# Patient Record
Sex: MALE | Race: WHITE | NOT HISPANIC OR LATINO | Employment: FULL TIME | URBAN - METROPOLITAN AREA
[De-identification: names, ages, dates, MRNs, and addresses within clinical notes are randomized per-mention and may not be internally consistent; named-entity substitution may affect disease eponyms.]

---

## 2017-01-09 ENCOUNTER — OFFICE VISIT (OUTPATIENT)
Dept: CARDIOLOGY | Facility: MEDICAL CENTER | Age: 67
End: 2017-01-09
Payer: COMMERCIAL

## 2017-01-09 VITALS
HEIGHT: 72 IN | SYSTOLIC BLOOD PRESSURE: 114 MMHG | OXYGEN SATURATION: 92 % | DIASTOLIC BLOOD PRESSURE: 74 MMHG | WEIGHT: 188 LBS | BODY MASS INDEX: 25.47 KG/M2 | HEART RATE: 75 BPM

## 2017-01-09 DIAGNOSIS — Z91.148 NONCOMPLIANCE WITH MEDICATION REGIMEN: ICD-10-CM

## 2017-01-09 DIAGNOSIS — Z95.1 S/P CORONARY ARTERY BYPASS GRAFT X 5: ICD-10-CM

## 2017-01-09 DIAGNOSIS — I10 ESSENTIAL HYPERTENSION: ICD-10-CM

## 2017-01-09 DIAGNOSIS — F17.200 TOBACCO DEPENDENCE: ICD-10-CM

## 2017-01-09 DIAGNOSIS — I25.10 CORONARY ARTERY DISEASE INVOLVING NATIVE CORONARY ARTERY OF NATIVE HEART WITHOUT ANGINA PECTORIS: ICD-10-CM

## 2017-01-09 PROCEDURE — 99214 OFFICE O/P EST MOD 30 MIN: CPT | Performed by: INTERNAL MEDICINE

## 2017-01-09 RX ORDER — ATORVASTATIN CALCIUM 40 MG/1
40 TABLET, FILM COATED ORAL DAILY
Qty: 90 TAB | Refills: 3 | Status: SHIPPED | OUTPATIENT
Start: 2017-01-09 | End: 2021-06-01

## 2017-01-09 ASSESSMENT — ENCOUNTER SYMPTOMS
PALPITATIONS: 0
DEPRESSION: 0
SHORTNESS OF BREATH: 0
VOMITING: 0
CONSTITUTIONAL NEGATIVE: 1
NAUSEA: 0
HEADACHES: 0
SENSORY CHANGE: 1
WEAKNESS: 0

## 2017-01-09 NOTE — MR AVS SNAPSHOT
Hugo Gomez   2017 3:40 PM   Office Visit   MRN: [id number]    Department:  [facility]   Dept Phone:  [phone]    Description:  Male : 1950   Provider:  Olaf Carter M.D.           Reason for Visit     Follow-Up           Allergies as of 2017     No Known Allergies      You were diagnosed with     Coronary artery disease involving native coronary artery of native heart without angina pectoris   [4518406]       Tobacco dependence   [030136]       S/P coronary artery bypass graft x 5   [403349]       Essential hypertension   [4557427]         Vital Signs     Blood Pressure Pulse Height Weight Body Mass Index Oxygen Saturation    114/74 mmHg 75 1.829 m (6') 85.276 kg (188 lb) 25.49 kg/m2 92%    Smoking Status                   Current Some Day Smoker           Basic Information     Date Of Birth Sex Race Ethnicity Preferred Language    1950 Male White Non- English      Problem List              ICD-10-CM Priority Class Noted - Resolved    Angioedema T78.3XXA   2013 - Present    Elevated troponin R79.89   2013 - Present    History of myocardial infarction I25.2   Unknown - Present    S/P coronary artery bypass graft x 5 Z95.1   Unknown - Present    CAD (coronary artery disease) I25.10   2013 - Present    HTN (hypertension) I10   2013 - Present    Other and unspecified hyperlipidemia E78.5   2013 - Present    Tobacco dependence F17.200   2013 - Present    Ulnar neuropathy at wrist G56.20   2013 - Present    Peripheral autonomic neuropathy G90.9   2013 - Present    Venous insufficiency of leg I87.2   2014 - Present    Neuropathy of both feet G57.93   2015 - Present      Health Maintenance        Date Due Completion Dates    IMM DTaP/Tdap/Td Vaccine (1 - Tdap) 1970 ---    COLONOSCOPY 2001 ---    IMM ZOSTER VACCINE 2011 ---    IMM PNEUMOCOCCAL 65+ (ADULT) LOW/MEDIUM RISK SERIES (1 of 2 - PCV13)  1/9/2016 1/2/2010    IMM INFLUENZA (1) 9/1/2016 ---            Current Immunizations     Pneumococcal polysaccharide vaccine (PPSV-23) 1/2/2010      Below and/or attached are the medications your provider expects you to take. Review all of your home medications and newly ordered medications with your provider and/or pharmacist. Follow medication instructions as directed by your provider and/or pharmacist. Please keep your medication list with you and share with your provider. Update the information when medications are discontinued, doses are changed, or new medications (including over-the-counter products) are added; and carry medication information at all times in the event of emergency situations     Allergies:  No Known Allergies          Medications  Valid as of: January 09, 2017 -  3:56 PM    Generic Name Brand Name Tablet Size Instructions for use    Aspirin (Tablet Delayed Response) aspirin 81 MG Take 1 Tab by mouth every day.        Atorvastatin Calcium (Tab) LIPITOR 40 MG Take 1 Tab by mouth every day.        .                 Medicines prescribed today were sent to:     [address] ([place]), [place] [employer]    [facility] [place] [address]    Phone: [phone] Fax: [phone]    Open 24 Hours?: No      Medication refill instructions:       If your prescription bottle indicates you have medication refills left, it is not necessary to call your provider’s office. Please contact your pharmacy and they will refill your medication.    If your prescription bottle indicates you do not have any refills left, you may request refills at any time through one of the following ways: The online [email or web address] system (except Urgent Care), by calling your provider’s office, or by asking your pharmacy to contact your provider’s office with a refill request. Medication refills are processed only during regular business hours and may not be available until the next business day. Your provider may  request additional information or to have a follow-up visit with you prior to refilling your medication.   *Please Note: Medication refills are assigned a new Rx number when refilled electronically. Your pharmacy may indicate that no refills were authorized even though a new prescription for the same medication is available at the pharmacy. Please request the medicine by name with the pharmacy before contacting your provider for a refill.        Your To Do List     Future Labs/Procedures Complete By Expires    LIPID PROFILE  3/9/2017 7/11/2017         eyefactive Access Code: [email or web address]  Expires: 2/8/2017  3:56 PM    eyefactive  A secure, online tool to manage your health information     [employer]’s eyefactive® is a secure, online tool that connects you to your personalized health information from the privacy of your home -- day or night - making it very easy for you to manage your healthcare. Once the activation process is completed, you can even access your medical information using the eyefactive tarik, which is available for free in the Apple Tarik store or Google Play store.     eyefactive provides the following levels of access (as shown below):   My Chart Features   [facility] Doctor [facility]  Urgent  Care [facility]  Primary Care  Doctor   Email your healthcare team securely and privately 24/7 X X X    Manage appointments: schedule your next appointment; view details of past/upcoming appointments X      Request prescription refills. X      View recent personal medical records, including lab and immunizations X X X X   View health record, including health history, allergies, medications X X X X   Read reports about your outpatient visits, procedures, consult and ER notes X X X X   See your discharge summary, which is a recap of your hospital and/or ER visit that includes your diagnosis, lab results, and care plan. X X       How to register for eyefactive:  1. Go to   [email or web address].  2. Click on the Sign Up Now box, which takes you to the New Member Sign Up page. You will need to provide the following information:  a. Enter your [employer] Access Code exactly as it appears at the top of this page. (You will not need to use this code after you’ve completed the sign-up process. If you do not sign up before the expiration date, you must request a new code.)   b. Enter your date of birth.   c. Enter your home email address.   d. Click Submit, and follow the next screen’s instructions.  3. Create a [employer] ID. This will be your [employer] login ID and cannot be changed, so think of one that is secure and easy to remember.  4. Create a [employer] password. You can change your password at any time.  5. Enter your Password Reset Question and Answer. This can be used at a later time if you forget your password.   6. Enter your e-mail address. This allows you to receive e-mail notifications when new information is available in [employer].  7. Click Sign Up. You can now view your health information.    For assistance activating your [employer] account, call [phone]

## 2017-01-09 NOTE — Clinical Note
[facility]   [address]  [place] [address]  Phone: [phone]  Fax: [phone]              Hugo Gomez  1950    Encounter Date: 1/9/2017    Olaf Carter M.D.          PROGRESS NOTE:  Subjective:   Hugo Gomez is a 67 y.o. male who presents today for follow-up of coronary disease and prior bypass graft surgery he underwent 5 vessel bypass in December, 2013, with DULCE MARIA to LAD, saphenous vein graft to PDA, and a sequential saphenous vein graft to the diagonal, OM1, and OM 2.  He's had no angina at all. Unfortunately he is still smoking cigarettes.  The patient stopped his medicines (aspirin and atorvastatin) months ago for unclear reasons.    Past Medical History   Diagnosis Date   • ASTHMA      REMOTE HISTORY   • Myocardial infarct (HCC)      CURRENT   • Arrhythmia      REMOTE HISTORY IN HS   • History of myocardial infarction    • S/P coronary artery bypass graft x 5    • HTN (hypertension) 12/23/2013     Past Surgical History   Procedure Laterality Date   • Cholecystectomy     • Multiple coronary artery bypass endo vein harvest  12/7/2013     Performed by Mo Vincnet M.D. at SURGERY [facility] ORS     History reviewed. No pertinent family history.  History   Smoking status   • Current Some Day Smoker -- 0.50 packs/day   • Types: Cigarettes   Smokeless tobacco   • Never Used     No Known Allergies  Outpatient Encounter Prescriptions as of 1/9/2017   Medication Sig Dispense Refill   • atorvastatin (LIPITOR) 40 MG Tab Take 1 Tab by mouth every day. 90 Tab 3   • [DISCONTINUED] atorvastatin (LIPITOR) 40 MG Tab Take 1 Tab by mouth every day. (Patient not taking: Reported on 1/9/2017) 90 Tab 2   • aspirin EC 81 MG EC tablet Take 1 Tab by mouth every day. (Patient not taking: Reported on 1/9/2017) 30 Tab 3     No facility-administered encounter medications on file as of 1/9/2017.     Review of Systems   Constitutional:  Negative.  Negative for malaise/fatigue.   HENT: Negative for hearing loss.    Respiratory: Negative for shortness of breath.    Cardiovascular: Negative for chest pain, palpitations and leg swelling.   Gastrointestinal: Negative for nausea and vomiting.   Neurological: Positive for sensory change. Negative for weakness and headaches.        Complains of pain in his feet   Psychiatric/Behavioral: Negative for depression.        Objective:   /74 mmHg  Pulse 75  Ht 1.829 m (6')  Wt 85.276 kg (188 lb)  BMI 25.49 kg/m2  SpO2 92%    Physical Exam   Constitutional: He is oriented to person, place, and time. He appears well-developed and well-nourished. No distress.   HENT:   Head: Atraumatic.   Eyes: Conjunctivae and EOM are normal. Pupils are equal, round, and reactive to light.   Neck: Neck supple. No JVD present.   Cardiovascular: Normal rate and regular rhythm.    No murmur heard.  Physiologically split S2   Pulmonary/Chest: Effort normal and breath sounds normal. No respiratory distress. He has no wheezes. He has no rales.   Median sternotomy scar is well healed   Abdominal: Soft. There is no tenderness.   Musculoskeletal: He exhibits no edema.   Neurological: He is alert and oriented to person, place, and time.   Skin: Skin is warm and dry. He is not diaphoretic.   Lesion on left ear. Patient urged to see dermatology   Psychiatric: He has a normal mood and affect.       Assessment:     1. Coronary artery disease involving native coronary artery of native heart without angina pectoris  atorvastatin (LIPITOR) 40 MG Tab    LIPID PROFILE   2. Tobacco dependence  atorvastatin (LIPITOR) 40 MG Tab   3. S/P coronary artery bypass graft x 5  atorvastatin (LIPITOR) 40 MG Tab    LIPID PROFILE   4. Essential hypertension     5. Noncompliance with medication regimen         Medical Decision Making:  Today's Assessment / Status / Plan:     He is now 3 years post CABG and is doing well without any angina. Smoking  cessation was again strongly urged. He stopped all his medications on his own months ago. The rationale for continued aspirin and lipid therapy was discussed with him. He'll be restarted on aspirin 81 mg a day and atorvastatin 40 mg a day. His obstructive lipid profile 2 months from now. Compliance with medical regimen was strongly urged.      Chad Menjivar M.D.  [address] [address]  VIA Facsimile: [phone]

## 2017-01-10 NOTE — PROGRESS NOTES
Subjective:   Hugo Gomez is a 67 y.o. male who presents today for follow-up of coronary disease and prior bypass graft surgery he underwent 5 vessel bypass in December, 2013, with DULCE MARIA to LAD, saphenous vein graft to PDA, and a sequential saphenous vein graft to the diagonal, OM1, and OM 2.  He's had no angina at all. Unfortunately he is still smoking cigarettes.  The patient stopped his medicines (aspirin and atorvastatin) months ago for unclear reasons.    Past Medical History   Diagnosis Date   • ASTHMA      REMOTE HISTORY   • Myocardial infarct (HCC)      CURRENT   • Arrhythmia      REMOTE HISTORY IN HS   • History of myocardial infarction    • S/P coronary artery bypass graft x 5    • HTN (hypertension) 12/23/2013     Past Surgical History   Procedure Laterality Date   • Cholecystectomy     • Multiple coronary artery bypass endo vein harvest  12/7/2013     Performed by Mo Vincent M.D. at SURGERY [facility] ORS     History reviewed. No pertinent family history.  History   Smoking status   • Current Some Day Smoker -- 0.50 packs/day   • Types: Cigarettes   Smokeless tobacco   • Never Used     No Known Allergies  Outpatient Encounter Prescriptions as of 1/9/2017   Medication Sig Dispense Refill   • atorvastatin (LIPITOR) 40 MG Tab Take 1 Tab by mouth every day. 90 Tab 3   • [DISCONTINUED] atorvastatin (LIPITOR) 40 MG Tab Take 1 Tab by mouth every day. (Patient not taking: Reported on 1/9/2017) 90 Tab 2   • aspirin EC 81 MG EC tablet Take 1 Tab by mouth every day. (Patient not taking: Reported on 1/9/2017) 30 Tab 3     No facility-administered encounter medications on file as of 1/9/2017.     Review of Systems   Constitutional: Negative.  Negative for malaise/fatigue.   HENT: Negative for hearing loss.    Respiratory: Negative for shortness of breath.    Cardiovascular: Negative for chest pain, palpitations and leg swelling.   Gastrointestinal: Negative for nausea and vomiting.   Neurological:  Positive for sensory change. Negative for weakness and headaches.        Complains of pain in his feet   Psychiatric/Behavioral: Negative for depression.        Objective:   /74 mmHg  Pulse 75  Ht 1.829 m (6')  Wt 85.276 kg (188 lb)  BMI 25.49 kg/m2  SpO2 92%    Physical Exam   Constitutional: He is oriented to person, place, and time. He appears well-developed and well-nourished. No distress.   HENT:   Head: Atraumatic.   Eyes: Conjunctivae and EOM are normal. Pupils are equal, round, and reactive to light.   Neck: Neck supple. No JVD present.   Cardiovascular: Normal rate and regular rhythm.    No murmur heard.  Physiologically split S2   Pulmonary/Chest: Effort normal and breath sounds normal. No respiratory distress. He has no wheezes. He has no rales.   Median sternotomy scar is well healed   Abdominal: Soft. There is no tenderness.   Musculoskeletal: He exhibits no edema.   Neurological: He is alert and oriented to person, place, and time.   Skin: Skin is warm and dry. He is not diaphoretic.   Lesion on left ear. Patient urged to see dermatology   Psychiatric: He has a normal mood and affect.       Assessment:     1. Coronary artery disease involving native coronary artery of native heart without angina pectoris  atorvastatin (LIPITOR) 40 MG Tab    LIPID PROFILE   2. Tobacco dependence  atorvastatin (LIPITOR) 40 MG Tab   3. S/P coronary artery bypass graft x 5  atorvastatin (LIPITOR) 40 MG Tab    LIPID PROFILE   4. Essential hypertension     5. Noncompliance with medication regimen         Medical Decision Making:  Today's Assessment / Status / Plan:     He is now 3 years post CABG and is doing well without any angina. Smoking cessation was again strongly urged. He stopped all his medications on his own months ago. The rationale for continued aspirin and lipid therapy was discussed with him. He'll be restarted on aspirin 81 mg a day and atorvastatin 40 mg a day. His obstructive lipid profile 2  months from now. Compliance with medical regimen was strongly urged.

## 2017-02-09 ENCOUNTER — TELEPHONE (OUTPATIENT)
Dept: CARDIOLOGY | Facility: MEDICAL CENTER | Age: 67
End: 2017-02-09

## 2017-02-09 NOTE — TELEPHONE ENCOUNTER
Spoke with Yennifer. Pt. Has had several episodes (including 4  in the past week) of left sided facial swelling.. Wife denied dysphagia, tongue swelling. Pt. Thinks it could be due to Atorvastatin 40mg which Dr. Carter had him restart at 1-9-17 [facility].  He takes Benadryl and sx. Slowly resolve. Pt. Will hold Atorvastatin and await further recommendations from Dr. Carter when he returns to office next week.

## 2017-02-09 NOTE — TELEPHONE ENCOUNTER
----- Message from Stefany Wiledr sent at 2/9/2017  8:13 AM PST -----  Regarding: Problem with swelling in jaw and chin  EMILIA/Jessi    Patient's wife, Yennifer, wants a call back at [phone]. Her  is having problems with swelling in his jaw and chin and she thinks it's a reaction to the Lipitor.

## 2017-02-10 NOTE — TELEPHONE ENCOUNTER
Left message for Yennifer to call.        Message  Received: Today       Olaf Carter M.D.  Jessi Cardenas, L.P.N.       Caller: Unspecified (Yesterday, 8:38 AM)                     Unlikely to be atorvastatin unless lips / tongue involved.   Could be many things including parotid gland stone. Needs to be examined when he has itt

## 2017-02-11 NOTE — TELEPHONE ENCOUNTER
"Spoke with Yennifer to advise. Pt. Only stopped Atorvastatin 2 days ago. He hasn't had any sx. In the past 2 days. Now she noted that pt. Told her during 1 episode \"that he felt like his throat was closing\". She will call in 1 week with update, sooner if sx. Recur while pt. Is off Atorvastatin.  "

## 2017-02-21 ENCOUNTER — TELEPHONE (OUTPATIENT)
Dept: CARDIOLOGY | Facility: MEDICAL CENTER | Age: 67
End: 2017-02-21

## 2017-02-21 NOTE — TELEPHONE ENCOUNTER
----- Message from Ankita Parr sent at 2/21/2017  2:06 PM PST -----  Regarding: patient's wife calling with update  Sergey        Patient's wife Yennifer calling with an update on his condition. She can be reached at [phone].

## 2017-02-21 NOTE — TELEPHONE ENCOUNTER
Per Micelle, pt. Has had no more left -sided facial swelling since stopping Atorvastatin 40mg 12 days ago. He has never taken any other statin medications.  To Dr. Carter for recommendations.

## 2017-02-27 NOTE — TELEPHONE ENCOUNTER
Den De Oliveira, scheduled pt. To see Carlos Manuel on 3-6-17.      Message  Received: 3 days ago       PILAR Hoffmann L.P.N.       Caller: Unspecified (6 days ago, 2:49 PM)                     Stay off statin.   Needs follow up appointment

## 2019-08-26 ENCOUNTER — APPOINTMENT (RX ONLY)
Dept: URBAN - METROPOLITAN AREA CLINIC 4 | Facility: CLINIC | Age: 69
Setting detail: DERMATOLOGY
End: 2019-08-26

## 2019-08-26 DIAGNOSIS — Z85.828 PERSONAL HISTORY OF OTHER MALIGNANT NEOPLASM OF SKIN: ICD-10-CM

## 2019-08-26 DIAGNOSIS — L82.1 OTHER SEBORRHEIC KERATOSIS: ICD-10-CM

## 2019-08-26 DIAGNOSIS — D18.0 HEMANGIOMA: ICD-10-CM

## 2019-08-26 DIAGNOSIS — L57.0 ACTINIC KERATOSIS: ICD-10-CM

## 2019-08-26 DIAGNOSIS — L81.4 OTHER MELANIN HYPERPIGMENTATION: ICD-10-CM

## 2019-08-26 DIAGNOSIS — D22 MELANOCYTIC NEVI: ICD-10-CM

## 2019-08-26 PROBLEM — D18.01 HEMANGIOMA OF SKIN AND SUBCUTANEOUS TISSUE: Status: ACTIVE | Noted: 2019-08-26

## 2019-08-26 PROBLEM — D48.5 NEOPLASM OF UNCERTAIN BEHAVIOR OF SKIN: Status: ACTIVE | Noted: 2019-08-26

## 2019-08-26 PROBLEM — D22.9 MELANOCYTIC NEVI, UNSPECIFIED: Status: ACTIVE | Noted: 2019-08-26

## 2019-08-26 PROCEDURE — ? LIQUID NITROGEN

## 2019-08-26 PROCEDURE — 17003 DESTRUCT PREMALG LES 2-14: CPT

## 2019-08-26 PROCEDURE — ? BIOPSY BY SHAVE METHOD

## 2019-08-26 PROCEDURE — 17000 DESTRUCT PREMALG LESION: CPT | Mod: 59

## 2019-08-26 PROCEDURE — ? COUNSELING

## 2019-08-26 PROCEDURE — 11103 TANGNTL BX SKIN EA SEP/ADDL: CPT

## 2019-08-26 PROCEDURE — 11102 TANGNTL BX SKIN SINGLE LES: CPT

## 2019-08-26 PROCEDURE — ? OBSERVATION

## 2019-08-26 PROCEDURE — 99212 OFFICE O/P EST SF 10 MIN: CPT | Mod: 25

## 2019-08-26 PROCEDURE — ? REFERRAL CORRESPONDENCE

## 2019-08-26 ASSESSMENT — LOCATION SIMPLE DESCRIPTION DERM
LOCATION SIMPLE: LEFT FOREHEAD
LOCATION SIMPLE: RIGHT EYEBROW
LOCATION SIMPLE: LEFT SCALP
LOCATION SIMPLE: LEFT EAR
LOCATION SIMPLE: SCALP
LOCATION SIMPLE: LEFT HAND
LOCATION SIMPLE: LEFT TEMPLE

## 2019-08-26 ASSESSMENT — LOCATION DETAILED DESCRIPTION DERM
LOCATION DETAILED: LEFT MEDIAL TEMPLE
LOCATION DETAILED: LEFT SUPERIOR PARIETAL SCALP
LOCATION DETAILED: LEFT MEDIAL FRONTAL SCALP
LOCATION DETAILED: LEFT SUPERIOR CRUS OF ANTIHELIX
LOCATION DETAILED: RIGHT SUPERIOR PARIETAL SCALP
LOCATION DETAILED: RIGHT CENTRAL EYEBROW
LOCATION DETAILED: LEFT SUPERIOR MEDIAL FOREHEAD
LOCATION DETAILED: LEFT ULNAR DORSAL HAND

## 2019-08-26 ASSESSMENT — LOCATION ZONE DERM
LOCATION ZONE: FACE
LOCATION ZONE: EAR
LOCATION ZONE: SCALP
LOCATION ZONE: HAND

## 2019-08-26 NOTE — PROCEDURE: BIOPSY BY SHAVE METHOD
Post-Care Instructions: I reviewed with the patient in detail post-care instructions. Patient is to keep the biopsy site dry overnight, and then apply vasaline twice daily until healed.
Anesthesia Type: 1% lidocaine with epinephrine and a 1:10 solution of 8.4% sodium bicarbonate
Size Of Lesion In Cm: 0
Lab Facility: 
Wound Care: Vaseline
Electrodesiccation Text: The wound bed was treated with electrodesiccation after the biopsy was performed.
Notification Instructions: Patient will be notified of biopsy results. However, patient instructed to call the office if not contacted within 2 weeks.
Path Notes (To The Dermatopathologist): ED+C if +
Depth Of Biopsy: dermis
Bill For Surgical Tray: no
Type Of Destruction Used: Curettage
Consent: Written consent was obtained and risks were reviewed including but not limited to scarring, infection, bleeding, scabbing, incomplete removal, nerve damage and allergy to anesthesia.
Biopsy Type: H and E
Electrodesiccation And Curettage Text: The wound bed was treated with electrodesiccation and curettage after the biopsy was performed.
Anesthesia Volume In Cc: 0.5
Render Post-Care Instructions In Note?: yes
Dressing: Band-Aid
Hemostasis: Drysol and Electrocautery
Billing Type: Third-Party Bill
Biopsy Method: Personna blade
Detail Level: Detailed
Lab: [id number]
Curettage Text: The wound bed was treated with curettage after the biopsy was performed.

## 2019-09-10 ENCOUNTER — APPOINTMENT (RX ONLY)
Dept: URBAN - METROPOLITAN AREA CLINIC 4 | Facility: CLINIC | Age: 69
Setting detail: DERMATOLOGY
End: 2019-09-10

## 2019-09-10 PROBLEM — C44.519 BASAL CELL CARCINOMA OF SKIN OF OTHER PART OF TRUNK: Status: ACTIVE | Noted: 2019-09-10

## 2019-09-10 PROCEDURE — 17261 DSTRJ MAL LES T/A/L .6-1.0CM: CPT

## 2019-09-10 PROCEDURE — 17262 DSTRJ MAL LES T/A/L 1.1-2.0: CPT

## 2019-09-10 PROCEDURE — ? CURETTAGE AND DESTRUCTION

## 2019-09-10 NOTE — PROCEDURE: CURETTAGE AND DESTRUCTION
Bill For Surgical Tray: no
What Was Performed First?: Curettage
Bill As A Line Item Or As Units: Line Item
Size Of Lesion After Curettage: 1.2
Anesthesia Volume In Cc: 3
Concentration (Mg/Ml Or Millions Of Plaque Forming Units/Cc): 0.01
Total Volume (Ccs): 1
Biopsy Photograph Reviewed: Yes
Consent was obtained from the patient. The risks, benefits and alternatives to therapy were discussed in detail. Specifically, the risks of infection, scarring, bleeding, prolonged wound healing, nerve injury, incomplete removal, allergy to anesthesia and recurrence were addressed. Alternatives to ED&C, such as: surgical removal and XRT were also discussed.  Prior to the procedure, the treatment site was clearly identified and confirmed by the patient. All components of Universal Protocol/PAUSE Rule completed.
Accession # (Optional): [id number]
Anesthesia Type: 0.5% lidocaine with 1:100,000 epinephrine and a 1:10 solution of 8.4% sodium bicarbonate
Detail Level: Detailed
Additional Information: (Optional): The wound was cleaned, and a bandage was applied.  The patient received detailed post-op instructions.
Post-Care Instructions: I reviewed with the patient in detail post-care instructions. Patient is to keep the area dry for 48 hours, and not to engage in any swimming until the area is healed. Should the patient develop any fevers, chills, bleeding, severe pain patient will contact the office immediately.
Cautery Type: electrodesiccation
Size Of Lesion After Curettage: 0.6
Accession # (Optional): [id number]
Anesthesia Type: 0.5% lidocaine with 1:200,000 epinephrine and a 1:10 solution of 8.4% sodium bicarbonate

## 2019-11-12 ENCOUNTER — HOSPITAL ENCOUNTER (OUTPATIENT)
Dept: LAB | Facility: MEDICAL CENTER | Age: 69
End: 2019-11-12
Attending: FAMILY MEDICINE
Payer: COMMERCIAL

## 2019-11-12 LAB
ALBUMIN SERPL BCP-MCNC: 4 G/DL (ref 3.2–4.9)
ALBUMIN/GLOB SERPL: 1.3 G/DL
ALP SERPL-CCNC: 69 U/L (ref 30–99)
ALT SERPL-CCNC: 13 U/L (ref 2–50)
ANION GAP SERPL CALC-SCNC: 7 MMOL/L (ref 0–11.9)
AST SERPL-CCNC: 17 U/L (ref 12–45)
BILIRUB SERPL-MCNC: 0.4 MG/DL (ref 0.1–1.5)
BUN SERPL-MCNC: 20 MG/DL (ref 8–22)
CALCIUM SERPL-MCNC: 9.8 MG/DL (ref 8.5–10.5)
CHLORIDE SERPL-SCNC: 107 MMOL/L (ref 96–112)
CHOLEST SERPL-MCNC: 169 MG/DL (ref 100–199)
CO2 SERPL-SCNC: 28 MMOL/L (ref 20–33)
CREAT SERPL-MCNC: 1.02 MG/DL (ref 0.5–1.4)
GLOBULIN SER CALC-MCNC: 3.1 G/DL (ref 1.9–3.5)
GLUCOSE SERPL-MCNC: 105 MG/DL (ref 65–99)
HDLC SERPL-MCNC: 48 MG/DL
LDLC SERPL CALC-MCNC: 100 MG/DL
POTASSIUM SERPL-SCNC: 4.5 MMOL/L (ref 3.6–5.5)
PROT SERPL-MCNC: 7.1 G/DL (ref 6–8.2)
SODIUM SERPL-SCNC: 142 MMOL/L (ref 135–145)
TRIGL SERPL-MCNC: 105 MG/DL (ref 0–149)

## 2019-11-12 PROCEDURE — 80061 LIPID PANEL: CPT

## 2019-11-12 PROCEDURE — 80053 COMPREHEN METABOLIC PANEL: CPT

## 2019-11-12 PROCEDURE — 36415 COLL VENOUS BLD VENIPUNCTURE: CPT

## 2020-02-24 ENCOUNTER — APPOINTMENT (RX ONLY)
Dept: URBAN - METROPOLITAN AREA CLINIC 4 | Facility: CLINIC | Age: 70
Setting detail: DERMATOLOGY
End: 2020-02-24

## 2020-02-24 DIAGNOSIS — D22 MELANOCYTIC NEVI: ICD-10-CM

## 2020-02-24 DIAGNOSIS — Z85.828 PERSONAL HISTORY OF OTHER MALIGNANT NEOPLASM OF SKIN: ICD-10-CM

## 2020-02-24 DIAGNOSIS — L82.1 OTHER SEBORRHEIC KERATOSIS: ICD-10-CM

## 2020-02-24 DIAGNOSIS — L84 CORNS AND CALLOSITIES: ICD-10-CM

## 2020-02-24 DIAGNOSIS — L81.4 OTHER MELANIN HYPERPIGMENTATION: ICD-10-CM

## 2020-02-24 PROBLEM — D22.5 MELANOCYTIC NEVI OF TRUNK: Status: ACTIVE | Noted: 2020-02-24

## 2020-02-24 PROCEDURE — ? ADDITIONAL NOTES

## 2020-02-24 PROCEDURE — ? OBSERVATION

## 2020-02-24 PROCEDURE — 99213 OFFICE O/P EST LOW 20 MIN: CPT

## 2020-02-24 PROCEDURE — ? COUNSELING

## 2020-02-24 ASSESSMENT — LOCATION DETAILED DESCRIPTION DERM
LOCATION DETAILED: LEFT RADIAL DORSAL HAND
LOCATION DETAILED: RIGHT RADIAL DORSAL HAND
LOCATION DETAILED: LEFT CENTRAL MALAR CHEEK
LOCATION DETAILED: RIGHT PLANTAR FOREFOOT OVERLYING 4TH METATARSAL
LOCATION DETAILED: INFERIOR THORACIC SPINE
LOCATION DETAILED: LEFT PROXIMAL DORSAL FOREARM
LOCATION DETAILED: RIGHT PROXIMAL RADIAL DORSAL FOREARM
LOCATION DETAILED: RIGHT INFERIOR MEDIAL UPPER BACK
LOCATION DETAILED: RIGHT MID-UPPER BACK
LOCATION DETAILED: RIGHT SUPERIOR MEDIAL MIDBACK
LOCATION DETAILED: EPIGASTRIC SKIN
LOCATION DETAILED: RIGHT PROXIMAL DORSAL FOREARM

## 2020-02-24 ASSESSMENT — LOCATION SIMPLE DESCRIPTION DERM
LOCATION SIMPLE: ABDOMEN
LOCATION SIMPLE: LEFT FOREARM
LOCATION SIMPLE: UPPER BACK
LOCATION SIMPLE: RIGHT HAND
LOCATION SIMPLE: RIGHT PLANTAR SURFACE
LOCATION SIMPLE: LEFT HAND
LOCATION SIMPLE: RIGHT FOREARM
LOCATION SIMPLE: RIGHT UPPER BACK
LOCATION SIMPLE: RIGHT LOWER BACK
LOCATION SIMPLE: LEFT CHEEK

## 2020-02-24 ASSESSMENT — LOCATION ZONE DERM
LOCATION ZONE: FEET
LOCATION ZONE: HAND
LOCATION ZONE: TRUNK
LOCATION ZONE: FACE
LOCATION ZONE: ARM

## 2020-02-24 NOTE — PROCEDURE: COUNSELING
Patient Specific Counseling (Will Not Stick From Patient To Patient): If this becomes more bothersome he should see a foot and ankle orthopedic surgeon as this has been painful and may require surgery.
Detail Level: Detailed

## 2020-02-24 NOTE — PROCEDURE: ADDITIONAL NOTES
Detail Level: Zone
Additional Notes: He can apply over the counter Compound W to lesions that are bothersome.

## 2020-03-09 ENCOUNTER — OFFICE VISIT (OUTPATIENT)
Dept: CARDIOLOGY | Facility: MEDICAL CENTER | Age: 70
End: 2020-03-09
Payer: COMMERCIAL

## 2020-03-09 VITALS
SYSTOLIC BLOOD PRESSURE: 100 MMHG | OXYGEN SATURATION: 90 % | DIASTOLIC BLOOD PRESSURE: 60 MMHG | BODY MASS INDEX: 25.47 KG/M2 | HEART RATE: 92 BPM | HEIGHT: 72 IN | WEIGHT: 188 LBS

## 2020-03-09 DIAGNOSIS — Z78.9 STATIN INTOLERANCE: ICD-10-CM

## 2020-03-09 DIAGNOSIS — I25.10 CORONARY ARTERY DISEASE INVOLVING NATIVE CORONARY ARTERY OF NATIVE HEART WITHOUT ANGINA PECTORIS: ICD-10-CM

## 2020-03-09 DIAGNOSIS — I10 ESSENTIAL HYPERTENSION: ICD-10-CM

## 2020-03-09 DIAGNOSIS — F17.200 TOBACCO DEPENDENCE: ICD-10-CM

## 2020-03-09 DIAGNOSIS — Z95.1 S/P CORONARY ARTERY BYPASS GRAFT X 5: ICD-10-CM

## 2020-03-09 DIAGNOSIS — I25.2 HISTORY OF MYOCARDIAL INFARCTION: ICD-10-CM

## 2020-03-09 PROCEDURE — 99203 OFFICE O/P NEW LOW 30 MIN: CPT | Performed by: INTERNAL MEDICINE

## 2020-03-09 RX ORDER — LOVASTATIN 10 MG/1
10 TABLET ORAL
COMMUNITY
Start: 2020-02-29 | End: 2021-06-01

## 2020-03-09 RX ORDER — ROSUVASTATIN CALCIUM 10 MG/1
10 TABLET, COATED ORAL
Qty: 30 TAB | Refills: 3 | Status: SHIPPED | OUTPATIENT
Start: 2020-03-09 | End: 2020-12-30

## 2020-03-09 ASSESSMENT — ENCOUNTER SYMPTOMS
GASTROINTESTINAL NEGATIVE: 1
DEPRESSION: 1
CONSTITUTIONAL NEGATIVE: 1
MUSCULOSKELETAL NEGATIVE: 1
SENSORY CHANGE: 1
RESPIRATORY NEGATIVE: 1
CARDIOVASCULAR NEGATIVE: 1

## 2020-03-09 NOTE — PROGRESS NOTES
Chief Complaint   Patient presents with   • Coronary Artery Disease       Subjective:   Hugo Gomez is a 70 y.o. male who presents today for follow-up of coronary disease.  He was last seen in the office a little over 3 years ago.  The patient underwent bypass graft surgery in December, 2013, with DULCE MARIA to LAD and saphenous vein graft to PDA and a sequential saphenous vein graft to diagonal, OM1 and OM 2.  He has had no angina at all.  Unfortunately still smoking cigarettes.    He has a difficult time tolerating statins.  He knows they cause mood changes and some depression.  He clearly feels better within a few days of stopping them.  Is currently on low-dose lovastatin with LDL under marginal control.  Otherwise no interval problems except for his continued problems with peripheral neuropathy.  He is moderately physically active at work and also works out on a [employer] 3 times a week.  He is smoking about half pack of cigarettes daily.    Past Medical History:   Diagnosis Date   • Arrhythmia     REMOTE HISTORY IN HS   • ASTHMA     REMOTE HISTORY   • History of myocardial infarction    • HTN (hypertension) 12/23/2013   • Myocardial infarct (HCC)     CURRENT   • S/P coronary artery bypass graft x 5      Past Surgical History:   Procedure Laterality Date   • MULTIPLE CORONARY ARTERY BYPASS ENDO VEIN HARVEST  12/7/2013    Performed by Mo Vincent M.D. at SURGERY [facility] ORS   • CHOLECYSTECTOMY       No family history on file.  Social History     Socioeconomic History   • Marital status:      Spouse name: Kaylee   • Number of children: Not on file   • Years of education: Not on file   • Highest education level: Not on file   Occupational History     Employer: [employer]   Social Needs   • Financial resource strain: Not on file   • Food insecurity     Worry: Not on file     Inability: Not on file   • Transportation needs     Medical: Not on file     Non-medical: Not on file   Tobacco Use   •  Smoking status: Current Some Day Smoker     Packs/day: 0.50     Types: Cigarettes   • Smokeless tobacco: Never Used   Substance and Sexual Activity   • Alcohol use: Yes     Comment: SOCIAL   • Drug use: No   • Sexual activity: Not on file   Lifestyle   • Physical activity     Days per week: Not on file     Minutes per session: Not on file   • Stress: Not on file   Relationships   • Social connections     Talks on phone: Not on file     Gets together: Not on file     Attends Mandaeism service: Not on file     Active member of club or organization: Not on file     Attends meetings of clubs or organizations: Not on file     Relationship status: Not on file   • Intimate partner violence     Fear of current or ex partner: Not on file     Emotionally abused: Not on file     Physically abused: Not on file     Forced sexual activity: Not on file   Other Topics Concern   • Not on file   Social History Narrative   • Not on file     No Known Allergies  Outpatient Encounter Medications as of 3/9/2020   Medication Sig Dispense Refill   • lovastatin (MEVACOR) 10 MG tablet Take 10 mg by mouth.     • rosuvastatin (CRESTOR) 10 MG Tab Take 1 Tab by mouth every Monday, Wednesday, and Friday. 30 Tab 3   • aspirin EC 81 MG EC tablet Take 1 Tab by mouth every day. 30 Tab 3   • atorvastatin (LIPITOR) 40 MG Tab Take 1 Tab by mouth every day. (Patient not taking: Reported on 3/9/2020) 90 Tab 3     No facility-administered encounter medications on file as of 3/9/2020.      Review of Systems   Constitutional: Negative.    HENT: Negative.    Respiratory: Negative.    Cardiovascular: Negative.    Gastrointestinal: Negative.    Musculoskeletal: Negative.    Skin: Negative.    Neurological: Positive for sensory change.        Peripheral neuropathy   Endo/Heme/Allergies: Negative.    Psychiatric/Behavioral: Positive for depression.        Objective:   /60 (BP Location: Left arm, Patient Position: Sitting, BP Cuff Size: Adult)   Pulse 92    Ht 1.829 m (6')   Wt 85.3 kg (188 lb)   SpO2 90%   BMI 25.50 kg/m²     Physical Exam   Constitutional: He is oriented to person, place, and time. He appears well-nourished. No distress.   HENT:   Head: Normocephalic and atraumatic.   Eyes: Pupils are equal, round, and reactive to light. No scleral icterus.   Neck: No JVD present. No tracheal deviation present.   Cardiovascular: Normal rate and regular rhythm.   No murmur heard.  Pulmonary/Chest: Breath sounds normal. No respiratory distress. He has no wheezes.   Abdominal: Soft. Bowel sounds are normal. He exhibits no distension. There is no abdominal tenderness.   Musculoskeletal:         General: No edema.   Neurological: He is alert and oriented to person, place, and time.   Skin: Skin is warm and dry.   Psychiatric: He has a normal mood and affect.       Assessment:     1. Coronary artery disease involving native coronary artery of native heart without angina pectoris  Treadmill Stress    Lipid Profile   2. Essential hypertension  Lipid Profile   3. S/P coronary artery bypass graft x 5  Lipid Profile   4. Tobacco dependence  Lipid Profile   5. Statin intolerance  Treadmill Stress   6. History of myocardial infarction         Medical Decision Making:  Today's Assessment / Status / Plan:   Status post CABG: He was 9 years post surgery and due for stress testing.  A treadmill will be ordered.  He has had no symptoms of angina.    Hyperlipidemia: Difficult time with statins as described above.  Is never been on rosuvastatin and we discussed the fact that is metabolized differently from all the other statins.  The patient will take a one-week holiday from his statins and then will start him on 10 mg thrice weekly.  If he has problems he will call otherwise check lab in 3 months.  If he fails rosuvastatin then he would be a candidate for PCSK9 inhibitors and this was discussed with him today.    Nicotine dependence: Currently smoke about 1/2 pack of cigarettes  daily.  He is disinclined to discontinue smoking.    Plan:  Treadmill test.  Start rosuvastatin 10 mg Monday/Wednesday/Friday.  Lab in 3 months.  He will call if he cannot tolerate rosuvastatin and we will start him on PCSK9 inhibitors  Return 1 year

## 2020-04-07 ENCOUNTER — NON-PROVIDER VISIT (OUTPATIENT)
Dept: CARDIOLOGY | Facility: MEDICAL CENTER | Age: 70
End: 2020-04-07
Attending: INTERNAL MEDICINE
Payer: COMMERCIAL

## 2020-04-07 ENCOUNTER — TELEPHONE (OUTPATIENT)
Dept: CARDIOLOGY | Facility: MEDICAL CENTER | Age: 70
End: 2020-04-07

## 2020-04-07 VITALS
DIASTOLIC BLOOD PRESSURE: 70 MMHG | OXYGEN SATURATION: 96 % | BODY MASS INDEX: 25.47 KG/M2 | WEIGHT: 188 LBS | HEART RATE: 59 BPM | SYSTOLIC BLOOD PRESSURE: 118 MMHG | HEIGHT: 72 IN

## 2020-04-07 DIAGNOSIS — Z78.9 STATIN INTOLERANCE: ICD-10-CM

## 2020-04-07 DIAGNOSIS — I25.10 CORONARY ARTERY DISEASE INVOLVING NATIVE CORONARY ARTERY OF NATIVE HEART WITHOUT ANGINA PECTORIS: ICD-10-CM

## 2020-04-07 LAB — TREADMILL STRESS: NORMAL

## 2020-04-07 PROCEDURE — 93015 CV STRESS TEST SUPVJ I&R: CPT | Performed by: INTERNAL MEDICINE

## 2020-04-07 NOTE — TELEPHONE ENCOUNTER
----- Message from Olaf Carter M.D. sent at 4/7/2020  1:18 PM PDT -----  Regarding: Treadmill  Treadmill looks good.  Adequate exercise tolerance and no ischemia

## 2020-06-10 ENCOUNTER — TELEPHONE (OUTPATIENT)
Dept: CARDIOLOGY | Facility: MEDICAL CENTER | Age: 70
End: 2020-06-10

## 2020-06-10 NOTE — TELEPHONE ENCOUNTER
LVM for pt. regarding upcoming visit w/ RS asked pt. To CB if he has completed labs, or would like to set up a Virtual Visit.

## 2020-06-11 ENCOUNTER — HOSPITAL ENCOUNTER (OUTPATIENT)
Dept: LAB | Facility: MEDICAL CENTER | Age: 70
End: 2020-06-11
Attending: INTERNAL MEDICINE
Payer: COMMERCIAL

## 2020-06-11 DIAGNOSIS — Z95.1 S/P CORONARY ARTERY BYPASS GRAFT X 5: ICD-10-CM

## 2020-06-11 DIAGNOSIS — F17.200 TOBACCO DEPENDENCE: ICD-10-CM

## 2020-06-11 DIAGNOSIS — I25.10 CORONARY ARTERY DISEASE INVOLVING NATIVE CORONARY ARTERY OF NATIVE HEART WITHOUT ANGINA PECTORIS: ICD-10-CM

## 2020-06-11 DIAGNOSIS — I10 ESSENTIAL HYPERTENSION: ICD-10-CM

## 2020-06-11 LAB
CHOLEST SERPL-MCNC: 155 MG/DL (ref 100–199)
FASTING STATUS PATIENT QL REPORTED: NORMAL
HDLC SERPL-MCNC: 51 MG/DL
LDLC SERPL CALC-MCNC: 85 MG/DL
TRIGL SERPL-MCNC: 96 MG/DL (ref 0–149)

## 2020-06-11 PROCEDURE — 36415 COLL VENOUS BLD VENIPUNCTURE: CPT

## 2020-06-11 PROCEDURE — 80061 LIPID PANEL: CPT

## 2020-06-16 ENCOUNTER — OFFICE VISIT (OUTPATIENT)
Dept: CARDIOLOGY | Facility: MEDICAL CENTER | Age: 70
End: 2020-06-16
Payer: COMMERCIAL

## 2020-06-16 VITALS
BODY MASS INDEX: 24.38 KG/M2 | HEART RATE: 60 BPM | WEIGHT: 180 LBS | HEIGHT: 72 IN | OXYGEN SATURATION: 96 % | DIASTOLIC BLOOD PRESSURE: 76 MMHG | SYSTOLIC BLOOD PRESSURE: 120 MMHG

## 2020-06-16 DIAGNOSIS — I10 ESSENTIAL HYPERTENSION: ICD-10-CM

## 2020-06-16 DIAGNOSIS — I25.10 CORONARY ARTERY DISEASE INVOLVING NATIVE CORONARY ARTERY OF NATIVE HEART WITHOUT ANGINA PECTORIS: ICD-10-CM

## 2020-06-16 DIAGNOSIS — Z95.1 S/P CORONARY ARTERY BYPASS GRAFT X 5: ICD-10-CM

## 2020-06-16 DIAGNOSIS — I25.2 HISTORY OF MYOCARDIAL INFARCTION: ICD-10-CM

## 2020-06-16 DIAGNOSIS — Z78.9 STATIN INTOLERANCE: ICD-10-CM

## 2020-06-16 PROCEDURE — 99213 OFFICE O/P EST LOW 20 MIN: CPT | Performed by: INTERNAL MEDICINE

## 2020-06-16 ASSESSMENT — ENCOUNTER SYMPTOMS
CARDIOVASCULAR NEGATIVE: 1
MUSCULOSKELETAL NEGATIVE: 1
NERVOUS/ANXIOUS: 1
RESPIRATORY NEGATIVE: 1
DEPRESSION: 1
WEIGHT LOSS: 1
GASTROINTESTINAL NEGATIVE: 1
NEUROLOGICAL NEGATIVE: 1

## 2020-06-16 NOTE — PROGRESS NOTES
Chief Complaint   Patient presents with   • Old MI (> 6 Months)   • Coronary Artery Disease   • Coronary Artery Bypass Graft (CABG)   • HTN (Controlled)       Subjective:   Hugo Gomez is a 70 y.o. male who presents today for follow-up of remote CABG in December 2013 with graft to LAD, PDA and sequential saphenous vein graft to OM1 and OM 2.  Last visit, he was started on thrice weekly rosuvastatin and is tolerating this.  The patient is quite concerned about the COVID virus and has been anxious about it.  He is lost about 10 pounds by his report.  He states he is not eating well.  No history of melena no abdominal pain no jaundice.  He has had prior cholecystectomy.  LDL is 85 non-HDL cholesterol 104 on his current dose of rosuvastatin    Past Medical History:   Diagnosis Date   • Arrhythmia     REMOTE HISTORY IN HS   • ASTHMA     REMOTE HISTORY   • History of myocardial infarction    • HTN (hypertension) 12/23/2013   • Myocardial infarct (HCC)     CURRENT   • S/P coronary artery bypass graft x 5      Past Surgical History:   Procedure Laterality Date   • MULTIPLE CORONARY ARTERY BYPASS ENDO VEIN HARVEST  12/7/2013    Performed by Mo Vincent M.D. at SURGERY [facility] ORS   • CHOLECYSTECTOMY       History reviewed. No pertinent family history.  Social History     Socioeconomic History   • Marital status:      Spouse name: Kaylee   • Number of children: Not on file   • Years of education: Not on file   • Highest education level: Not on file   Occupational History     Employer: [employer]   Social Needs   • Financial resource strain: Not on file   • Food insecurity     Worry: Not on file     Inability: Not on file   • Transportation needs     Medical: Not on file     Non-medical: Not on file   Tobacco Use   • Smoking status: Current Some Day Smoker     Packs/day: 0.50     Types: Cigarettes   • Smokeless tobacco: Never Used   Substance and Sexual Activity   • Alcohol use: Yes     Comment: SOCIAL    • Drug use: No   • Sexual activity: Not on file   Lifestyle   • Physical activity     Days per week: Not on file     Minutes per session: Not on file   • Stress: Not on file   Relationships   • Social connections     Talks on phone: Not on file     Gets together: Not on file     Attends Worship service: Not on file     Active member of club or organization: Not on file     Attends meetings of clubs or organizations: Not on file     Relationship status: Not on file   • Intimate partner violence     Fear of current or ex partner: Not on file     Emotionally abused: Not on file     Physically abused: Not on file     Forced sexual activity: Not on file   Other Topics Concern   • Not on file   Social History Narrative   • Not on file     No Known Allergies  Outpatient Encounter Medications as of 6/16/2020   Medication Sig Dispense Refill   • rosuvastatin (CRESTOR) 10 MG Tab Take 1 Tab by mouth every Monday, Wednesday, and Friday. 30 Tab 3   • aspirin EC 81 MG EC tablet Take 1 Tab by mouth every day. 30 Tab 3   • lovastatin (MEVACOR) 10 MG tablet Take 10 mg by mouth.     • atorvastatin (LIPITOR) 40 MG Tab Take 1 Tab by mouth every day. (Patient not taking: Reported on 3/9/2020) 90 Tab 3     No facility-administered encounter medications on file as of 6/16/2020.      Review of Systems   Constitutional: Positive for weight loss.   HENT: Negative.    Respiratory: Negative.    Cardiovascular: Negative.    Gastrointestinal: Negative.    Musculoskeletal: Negative.    Skin: Negative.    Neurological: Negative.    Endo/Heme/Allergies: Negative.    Psychiatric/Behavioral: Positive for depression. The patient is nervous/anxious.         Objective:   /76 (BP Location: Left arm, Patient Position: Sitting, BP Cuff Size: Adult)   Pulse 60   Ht 1.829 m (6')   Wt 81.6 kg (180 lb)   SpO2 96%   BMI 24.41 kg/m²     Physical Exam   Constitutional: He is oriented to person, place, and time. He appears well-nourished. No  distress.   HENT:   Head: Normocephalic and atraumatic.   Eyes: Pupils are equal, round, and reactive to light. No scleral icterus.   Neck: No JVD present. No tracheal deviation present.   Cardiovascular: Normal rate and regular rhythm.   No murmur heard.  Pulmonary/Chest: Breath sounds normal. No respiratory distress. He has no wheezes.   Abdominal: Soft. Bowel sounds are normal. He exhibits no distension. There is no abdominal tenderness.   Musculoskeletal:         General: No edema.   Neurological: He is alert and oriented to person, place, and time.   Skin: Skin is warm and dry.   Psychiatric: He has a normal mood and affect.       Assessment:     1. Statin intolerance     2. S/P coronary artery bypass graft x 5     3. Coronary artery disease involving native coronary artery of native heart without angina pectoris     4. Essential hypertension     5. History of myocardial infarction         Medical Decision Making:  Today's Assessment / Status / Plan:   Coronary disease: Status post CABG 7 years ago.  No angina at all.  Recent treadmill test was reviewed and is negative for ischemia.    Hyperlipidemia: Improved with statin.  He will try to increase his statin to 4 times a week.    Weight loss: Etiology is uncertain.  He is having some difficulties with stress and anxiety.  He was given a name of to behavioral health specialist to follow-up with.  Patient instructed to call should he have further weight loss and then would consider abdominal ultrasound at that time to exclude any tumor etc.    Anxiety: As noted above he is given the name of to behavioral health specialist to follow-up with.

## 2020-11-10 ENCOUNTER — HOSPITAL ENCOUNTER (OUTPATIENT)
Dept: LAB | Facility: MEDICAL CENTER | Age: 70
End: 2020-11-10
Attending: FAMILY MEDICINE
Payer: COMMERCIAL

## 2020-11-10 LAB
25(OH)D3 SERPL-MCNC: 43 NG/ML (ref 30–100)
ALBUMIN SERPL BCP-MCNC: 4.4 G/DL (ref 3.2–4.9)
ALBUMIN/GLOB SERPL: 1.4 G/DL
ALP SERPL-CCNC: 71 U/L (ref 30–99)
ALT SERPL-CCNC: 20 U/L (ref 2–50)
ANION GAP SERPL CALC-SCNC: 12 MMOL/L (ref 7–16)
AST SERPL-CCNC: 21 U/L (ref 12–45)
BASOPHILS # BLD AUTO: 0.6 % (ref 0–1.8)
BASOPHILS # BLD: 0.08 K/UL (ref 0–0.12)
BILIRUB SERPL-MCNC: 0.7 MG/DL (ref 0.1–1.5)
BUN SERPL-MCNC: 17 MG/DL (ref 8–22)
CALCIUM SERPL-MCNC: 10.3 MG/DL (ref 8.5–10.5)
CHLORIDE SERPL-SCNC: 104 MMOL/L (ref 96–112)
CO2 SERPL-SCNC: 26 MMOL/L (ref 20–33)
CREAT SERPL-MCNC: 1 MG/DL (ref 0.5–1.4)
EOSINOPHIL # BLD AUTO: 0.2 K/UL (ref 0–0.51)
EOSINOPHIL NFR BLD: 1.5 % (ref 0–6.9)
ERYTHROCYTE [DISTWIDTH] IN BLOOD BY AUTOMATED COUNT: 51.8 FL (ref 35.9–50)
EST. AVERAGE GLUCOSE BLD GHB EST-MCNC: 123 MG/DL
FOLATE SERPL-MCNC: 17.2 NG/ML
GLOBULIN SER CALC-MCNC: 3.1 G/DL (ref 1.9–3.5)
GLUCOSE SERPL-MCNC: 83 MG/DL (ref 65–99)
HBA1C MFR BLD: 5.9 % (ref 0–5.6)
HCT VFR BLD AUTO: 49 % (ref 42–52)
HGB BLD-MCNC: 16.3 G/DL (ref 14–18)
IMM GRANULOCYTES # BLD AUTO: 0.05 K/UL (ref 0–0.11)
IMM GRANULOCYTES NFR BLD AUTO: 0.4 % (ref 0–0.9)
LYMPHOCYTES # BLD AUTO: 2.54 K/UL (ref 1–4.8)
LYMPHOCYTES NFR BLD: 18.8 % (ref 22–41)
MCH RBC QN AUTO: 33 PG (ref 27–33)
MCHC RBC AUTO-ENTMCNC: 33.3 G/DL (ref 33.7–35.3)
MCV RBC AUTO: 99.2 FL (ref 81.4–97.8)
MONOCYTES # BLD AUTO: 1.13 K/UL (ref 0–0.85)
MONOCYTES NFR BLD AUTO: 8.4 % (ref 0–13.4)
NEUTROPHILS # BLD AUTO: 9.48 K/UL (ref 1.82–7.42)
NEUTROPHILS NFR BLD: 70.3 % (ref 44–72)
NRBC # BLD AUTO: 0 K/UL
NRBC BLD-RTO: 0 /100 WBC
PLATELET # BLD AUTO: 260 K/UL (ref 164–446)
PMV BLD AUTO: 9.4 FL (ref 9–12.9)
POTASSIUM SERPL-SCNC: 4.9 MMOL/L (ref 3.6–5.5)
PROT SERPL-MCNC: 7.5 G/DL (ref 6–8.2)
RBC # BLD AUTO: 4.94 M/UL (ref 4.7–6.1)
SODIUM SERPL-SCNC: 142 MMOL/L (ref 135–145)
TREPONEMA PALLIDUM IGG+IGM AB [PRESENCE] IN SERUM OR PLASMA BY IMMUNOASSAY: NORMAL
TSH SERPL DL<=0.005 MIU/L-ACNC: 1.05 UIU/ML (ref 0.38–5.33)
VIT B12 SERPL-MCNC: 731 PG/ML (ref 211–911)
WBC # BLD AUTO: 13.5 K/UL (ref 4.8–10.8)

## 2020-11-10 PROCEDURE — 82607 VITAMIN B-12: CPT

## 2020-11-10 PROCEDURE — 82306 VITAMIN D 25 HYDROXY: CPT

## 2020-11-10 PROCEDURE — 85025 COMPLETE CBC W/AUTO DIFF WBC: CPT

## 2020-11-10 PROCEDURE — 83036 HEMOGLOBIN GLYCOSYLATED A1C: CPT

## 2020-11-10 PROCEDURE — 80053 COMPREHEN METABOLIC PANEL: CPT

## 2020-11-10 PROCEDURE — 84443 ASSAY THYROID STIM HORMONE: CPT

## 2020-11-10 PROCEDURE — 36415 COLL VENOUS BLD VENIPUNCTURE: CPT

## 2020-11-10 PROCEDURE — 86780 TREPONEMA PALLIDUM: CPT

## 2020-11-10 PROCEDURE — 82746 ASSAY OF FOLIC ACID SERUM: CPT

## 2020-11-30 ENCOUNTER — HOSPITAL ENCOUNTER (OUTPATIENT)
Dept: LAB | Facility: MEDICAL CENTER | Age: 70
End: 2020-11-30
Attending: SPECIALIST
Payer: COMMERCIAL

## 2020-11-30 LAB
ERYTHROCYTE [SEDIMENTATION RATE] IN BLOOD BY WESTERGREN METHOD: 2 MM/HOUR (ref 0–20)
RHEUMATOID FACT SER IA-ACNC: 13 IU/ML (ref 0–14)

## 2020-11-30 PROCEDURE — 86038 ANTINUCLEAR ANTIBODIES: CPT

## 2020-11-30 PROCEDURE — 36415 COLL VENOUS BLD VENIPUNCTURE: CPT

## 2020-11-30 PROCEDURE — 84165 PROTEIN E-PHORESIS SERUM: CPT

## 2020-11-30 PROCEDURE — 86431 RHEUMATOID FACTOR QUANT: CPT

## 2020-11-30 PROCEDURE — 84155 ASSAY OF PROTEIN SERUM: CPT

## 2020-11-30 PROCEDURE — 85652 RBC SED RATE AUTOMATED: CPT

## 2020-11-30 PROCEDURE — 86334 IMMUNOFIX E-PHORESIS SERUM: CPT

## 2020-12-03 LAB
ALBUMIN SERPL ELPH-MCNC: 4.26 G/DL (ref 3.75–5.01)
ALPHA1 GLOB SERPL ELPH-MCNC: 0.26 G/DL (ref 0.19–0.46)
ALPHA2 GLOB SERPL ELPH-MCNC: 0.72 G/DL (ref 0.48–1.05)
B-GLOBULIN SERPL ELPH-MCNC: 0.72 G/DL (ref 0.48–1.1)
GAMMA GLOB SERPL ELPH-MCNC: 1.34 G/DL (ref 0.62–1.51)
INTERPRETATION SERPL IFE-IMP: NORMAL
INTERPRETATION SERPL IFE-IMP: NORMAL
PATHOLOGY STUDY: NORMAL
PROT SERPL-MCNC: 7.3 G/DL (ref 6.3–8.2)

## 2020-12-04 LAB — NUCLEAR IGG SER QL IA: NORMAL

## 2020-12-08 ENCOUNTER — NON-PROVIDER VISIT (OUTPATIENT)
Dept: NEUROLOGY | Facility: MEDICAL CENTER | Age: 70
End: 2020-12-08
Payer: COMMERCIAL

## 2020-12-08 DIAGNOSIS — G60.3 IDIOPATHIC PROGRESSIVE POLYNEUROPATHY: ICD-10-CM

## 2020-12-08 PROCEDURE — 95886 MUSC TEST DONE W/N TEST COMP: CPT | Performed by: SPECIALIST

## 2020-12-08 PROCEDURE — 99999 PR NO CHARGE: CPT | Performed by: SPECIALIST

## 2020-12-08 PROCEDURE — 95909 NRV CNDJ TST 5-6 STUDIES: CPT | Performed by: SPECIALIST

## 2020-12-08 NOTE — PROCEDURES
NERVE CONDUCTION STUDIES AND ELECTROMYOGRAPHY REPORT        12/08/20      Referring provider: Ryan Booker M.D.      SUMMARY OF PATIENT'S CLINICAL HISTORY,PHYSICAL EXAM, AND RATIONALE FOR TESTING:    Mr. Hugo Gomez 70 y.o. presenting with bilateral lower extremity numbness and pain.  Past Medical History is significant for :   Past Medical History:   Diagnosis Date   • Arrhythmia     REMOTE HISTORY IN HS   • ASTHMA     REMOTE HISTORY   • History of myocardial infarction    • HTN (hypertension) 12/23/2013   • Myocardial infarct (HCC)     CURRENT   • S/P coronary artery bypass graft x 5            The electrodiagnostic studies were performed to evaluate for possible peripheral neuropathy versus radiculopathy.      ELECTRODIAGNOSTIC EXAMINATION:  Nerve conduction studies (NCS) and electromyography (EMG) are utilized to evaluate direct or indirect damage to the peripheral nervous system. NCS are performed to measure the nerve(s) response(s) to electrostimulation across a given nerve segment. EMG evaluates the passive and active electrical activity of the muscle(s) in question.  Muscles are innervated by specific peripheral nerves and roots. Often times, several nerves the muscle to be examined in order to determine the presence or absence of the disease process. Furthermore, nerves and muscles may need to be tested in a [id number] comparison, as well as in additional extremities, as this may be crucial in characterizing the extent of the disease process, which may be diffuse or isolated and of varying degree of severity. The extent of the neurodiagnostic exam is justified as it may help arrive to a proper diagnosis, which ultimately may contribute to better management of the patient. Therefore, the nerves to muscles examined during the study were medically necessary.    Unless otherwise noted, temperature of the extremity(s) study was monitored before and during the examination and remained between 32  and 36 degrees C for the upper extremities, and between 30 and 36 degrees C for the lower extremities.      NERVE CONDUCTION STUDIES:  Sensory nerves:    -Bilateral sural nerves were tested. The responses could not be elicited with antidromic stimuli bilaterally.    Motor nerves:     - Bilateral Tibial nerves were examined. Recording electrodes placed at the Abductor     Hallucis muscles. The response was normal on the left.  The response on the right was mildly abnormal with a decreased amplitude response of 3.4 mV.  - Bilateral Deep Peroneal motor nerves were examined. Recording electrodes were placed at the Extensor Digitorum Brevis muscles.The response was normal on the right.  The response on the left was abnormal with a decreased amplitude of 2.1 mV and a decreased conduction velocity of 35 m/s.    LATE RESPONSES:  F waves were obtained and the following nerves: Bilateral tibial.  The responses within normal limits for the patient's age.      ELECTROMYOGRAPHY:  The study was performed the concentric needle electrode. Fibrillation and fasciculation activity is graded by convention from none (0) to continuous (4+).  Needle electrode examination was performed in the following muscles: Bilateral vastus lateralis, tibialis anterior, gastrocnemius, extensor digitorum brevis, abductor hallucis.  The muscles tested demonstrated normal insertional activity, normal motor unit morphology and recruitment. There were no elements suggestive of active denervation.      Nerve Conduction Studies     Stim Site NR Onset (ms) Norm Onset (ms) O-P Amp (µV) Norm O-P Amp Site1 Site2 Delta-P (ms) Dist (cm) Dakota (m/s) Norm Dakota (m/s)   Left Sural Anti Sensory (Lat Mall)   Calf *NR  <4.6  >3 Calf Lat Mall  14.0  >40   Right Sural Anti Sensory (Lat Mall)   Calf *NR  <4.6  >3 Calf Lat Mall  14.0  >40        Stim Site NR Onset (ms) Norm Onset (ms) O-P Amp (mV) Norm O-P Amp Site1 Site2 Delta-0 (ms) Dist (cm) Dakota (m/s) Norm Dakota (m/s)   Left  Peroneal EDB Motor (Ext Dig Brev)   Ankle    4.5 <6 *2.1 >2.5 B Fib Ankle 10.8 37.5 *35 >40   B Fib    15.3  2.3  Poplt B Fib 1.7 10.0 59    Poplt    17.0  2.3          Right Peroneal EDB Motor (Ext Dig Brev)   Ankle    4.7 <6 3.4 >2.5 B Fib Ankle 8.7 38.0 44 >40   B Fib    13.4  3.0  Poplt B Fib 1.3 10.0 77    Poplt    14.7  3.0          Left Tibial Motor (Abd Robles Brev)   Ankle    3.9 <6 4.2 >4 Knee Ankle 10.2 46.0 45 >40   Knee    14.1  2.9          Right Tibial Motor (Abd Robles Brev)   Ankle    3.8 <6 *3.4 >4 Knee Ankle 8.9 44.0 49 >40   Knee    12.7  2.7            F Wave Studies     NR F-Lat (ms) Lat Norm (ms)   Left Tibial (Abd Hallucis)      55.96 <57   Right Tibial (Abd Hallucis)      56.95 <57                         Electromyography     Side Muscle Nerve Root Ins Act Fibs Psw Amp Dur Poly Recrt Int Pat Comment   Right VastusLat Femoral L2-4 Nml Nml Nml Nml Nml 0 Nml Nml    Right AntTibialis Dp Br Fibular L4-5 Nml Nml Nml Nml Nml 0 Nml Nml    Right Gastroc Tibial S1-2 Nml Nml Nml Nml Nml 0 Nml Nml    Right Ext Dig Brev Dp Br Fibular L5, S1 Nml Nml Nml Nml Nml 0 Nml Nml    Right AbdHallucis MedPlantar S1-2 Nml Nml Nml Nml Nml 0 Nml Nml    Left VastusLat Femoral L2-4 Nml Nml Nml Nml Nml 0 Nml Nml    Left AntTibialis Dp Br Fibular L4-5 Nml Nml Nml Nml Nml 0 Nml Nml    Left Gastroc Tibial S1-2 Nml Nml Nml Nml Nml 0 Nml Nml    Left Ext Dig Brev Dp Br Fibular L5, S1 Nml Nml Nml Nml Nml 0 Nml Nml    Left AbdHallucis MedPlantar S1-2 Nml Nml Nml Nml Nml 0 Nml Nml          DIAGNOSTIC INTERPRETATION:   Extensive electrodiagnostic studies were performed to the bilateral lower extremities.  The results are as follows:    1.  Mild decrease in amplitude of the right tibial motor response and left peroneal motor response with a low normal amplitude left tibial motor and right peroneal motor responses.    2.  Absent sural sensory responses bilaterally.    3.  No evidence of radiculopathy selected muscle studied bilateral  lower extremities.    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  Clinical correlation is suggested.    ABHI Gupta M.D.

## 2020-12-29 DIAGNOSIS — I25.10 CORONARY ARTERY DISEASE INVOLVING NATIVE CORONARY ARTERY OF NATIVE HEART WITHOUT ANGINA PECTORIS: ICD-10-CM

## 2020-12-31 RX ORDER — ROSUVASTATIN CALCIUM 10 MG/1
TABLET, COATED ORAL
Qty: 30 TAB | Refills: 6 | Status: SHIPPED | OUTPATIENT
Start: 2020-12-31 | End: 2021-06-28 | Stop reason: SDUPTHER

## 2021-01-15 DIAGNOSIS — Z23 NEED FOR VACCINATION: ICD-10-CM

## 2021-04-13 ENCOUNTER — TELEPHONE (OUTPATIENT)
Dept: CARDIOLOGY | Facility: MEDICAL CENTER | Age: 71
End: 2021-04-13

## 2021-04-13 DIAGNOSIS — E78.5 HYPERLIPIDEMIA, UNSPECIFIED HYPERLIPIDEMIA TYPE: ICD-10-CM

## 2021-04-13 NOTE — TELEPHONE ENCOUNTER
Spoke with pt. His annual FV is due in June. Scheduled FV 6-1-21 with RS. Pre-visit lab order mailed to pt.

## 2021-04-13 NOTE — TELEPHONE ENCOUNTER
RS    Patient called and would like to get a recommendation from RS as to which cardiologist he should see. Please call him at [phone].    Thank you,    Tisha TRIANA

## 2021-05-11 ENCOUNTER — HOSPITAL ENCOUNTER (OUTPATIENT)
Dept: LAB | Facility: MEDICAL CENTER | Age: 71
End: 2021-05-11
Attending: FAMILY MEDICINE
Payer: COMMERCIAL

## 2021-05-11 LAB
ALBUMIN SERPL BCP-MCNC: 4.3 G/DL (ref 3.2–4.9)
ALBUMIN/GLOB SERPL: 1.3 G/DL
ALP SERPL-CCNC: 75 U/L (ref 30–99)
ALT SERPL-CCNC: 21 U/L (ref 2–50)
ANION GAP SERPL CALC-SCNC: 6 MMOL/L (ref 7–16)
AST SERPL-CCNC: 28 U/L (ref 12–45)
BILIRUB SERPL-MCNC: 0.4 MG/DL (ref 0.1–1.5)
BUN SERPL-MCNC: 18 MG/DL (ref 8–22)
CALCIUM SERPL-MCNC: 9.6 MG/DL (ref 8.5–10.5)
CHLORIDE SERPL-SCNC: 105 MMOL/L (ref 96–112)
CHOLEST SERPL-MCNC: 157 MG/DL (ref 100–199)
CO2 SERPL-SCNC: 27 MMOL/L (ref 20–33)
CREAT SERPL-MCNC: 1 MG/DL (ref 0.5–1.4)
EST. AVERAGE GLUCOSE BLD GHB EST-MCNC: 120 MG/DL
FASTING STATUS PATIENT QL REPORTED: NORMAL
GLOBULIN SER CALC-MCNC: 3.2 G/DL (ref 1.9–3.5)
GLUCOSE SERPL-MCNC: 91 MG/DL (ref 65–99)
HBA1C MFR BLD: 5.8 % (ref 4–5.6)
HDLC SERPL-MCNC: 46 MG/DL
LDLC SERPL CALC-MCNC: 80 MG/DL
POTASSIUM SERPL-SCNC: 5.2 MMOL/L (ref 3.6–5.5)
PROT SERPL-MCNC: 7.5 G/DL (ref 6–8.2)
SODIUM SERPL-SCNC: 138 MMOL/L (ref 135–145)
TRIGL SERPL-MCNC: 154 MG/DL (ref 0–149)

## 2021-05-11 PROCEDURE — 80061 LIPID PANEL: CPT

## 2021-05-11 PROCEDURE — 83036 HEMOGLOBIN GLYCOSYLATED A1C: CPT

## 2021-05-11 PROCEDURE — 80053 COMPREHEN METABOLIC PANEL: CPT

## 2021-05-11 PROCEDURE — 36415 COLL VENOUS BLD VENIPUNCTURE: CPT

## 2021-06-01 ENCOUNTER — OFFICE VISIT (OUTPATIENT)
Dept: CARDIOLOGY | Facility: MEDICAL CENTER | Age: 71
End: 2021-06-01
Payer: COMMERCIAL

## 2021-06-01 VITALS
SYSTOLIC BLOOD PRESSURE: 112 MMHG | HEART RATE: 61 BPM | BODY MASS INDEX: 24.92 KG/M2 | DIASTOLIC BLOOD PRESSURE: 62 MMHG | HEIGHT: 72 IN | RESPIRATION RATE: 16 BRPM | OXYGEN SATURATION: 94 % | WEIGHT: 184 LBS

## 2021-06-01 DIAGNOSIS — I25.10 CORONARY ARTERY DISEASE INVOLVING NATIVE CORONARY ARTERY OF NATIVE HEART WITHOUT ANGINA PECTORIS: ICD-10-CM

## 2021-06-01 DIAGNOSIS — I10 ESSENTIAL HYPERTENSION: ICD-10-CM

## 2021-06-01 DIAGNOSIS — E78.00 PURE HYPERCHOLESTEROLEMIA: ICD-10-CM

## 2021-06-01 DIAGNOSIS — Z95.1 S/P CORONARY ARTERY BYPASS GRAFT X 5: ICD-10-CM

## 2021-06-01 DIAGNOSIS — I25.2 HISTORY OF MYOCARDIAL INFARCTION: ICD-10-CM

## 2021-06-01 PROCEDURE — 99214 OFFICE O/P EST MOD 30 MIN: CPT | Performed by: INTERNAL MEDICINE

## 2021-06-01 RX ORDER — GABAPENTIN 100 MG/1
100 CAPSULE ORAL
COMMUNITY
Start: 2021-04-28 | End: 2021-06-01

## 2021-06-01 ASSESSMENT — ENCOUNTER SYMPTOMS
MUSCULOSKELETAL NEGATIVE: 1
RESPIRATORY NEGATIVE: 1
CONSTITUTIONAL NEGATIVE: 1
GASTROINTESTINAL NEGATIVE: 1
CARDIOVASCULAR NEGATIVE: 1
NEUROLOGICAL NEGATIVE: 1

## 2021-06-01 ASSESSMENT — FIBROSIS 4 INDEX: FIB4 SCORE: 1.65

## 2021-06-01 NOTE — PROGRESS NOTES
Chief Complaint   Patient presents with   • Coronary Artery Disease       Subjective:   Hugo Gomez is a 71 y.o. male who presents today for follow-up of coronary bypass graft surgery December 2013.  At that time he had an DULCE MARIA graft to his LAD, a graft to his PDA, and sequential saphenous vein graft to the diagonal, OM1 and OM 2.  He exercises vigorously most days of the week and has had no chest pain or exertional dyspnea.  The patient had a treadmill test in April, 2020 that showed good exercise tolerance no ischemia.  Lab from May 11, 2021 was reviewed with him.  LDL is at target and non-HDL cholesterol is 111.  No interval health problems.  He is up-to-date on colonoscopy      Past Medical History:   Diagnosis Date   • Arrhythmia     REMOTE HISTORY IN HS   • ASTHMA     REMOTE HISTORY   • History of myocardial infarction    • HTN (hypertension) 12/23/2013   • Myocardial infarct (HCC)     CURRENT   • S/P coronary artery bypass graft x 5      Past Surgical History:   Procedure Laterality Date   • MULTIPLE CORONARY ARTERY BYPASS ENDO VEIN HARVEST  12/7/2013    Performed by Mo Vincent M.D. at SURGERY [facility] ORS   • CHOLECYSTECTOMY       History reviewed. No pertinent family history.  Social History     Socioeconomic History   • Marital status:      Spouse name: Kaylee   • Number of children: Not on file   • Years of education: Not on file   • Highest education level: Not on file   Occupational History     Employer: [employer]   Tobacco Use   • Smoking status: Current Some Day Smoker     Packs/day: 0.50     Types: Cigarettes   • Smokeless tobacco: Never Used   Substance and Sexual Activity   • Alcohol use: Yes     Comment: SOCIAL   • Drug use: No   • Sexual activity: Not on file   Other Topics Concern   • Not on file   Social History Narrative   • Not on file     Social Determinants of Health     Financial Resource Strain:    • Difficulty of Paying Living Expenses:    Food Insecurity:    •  Worried About Running Out of Food in the Last Year:    • Ran Out of Food in the Last Year:    Transportation Needs:    • Lack of Transportation (Medical):    • Lack of Transportation (Non-Medical):    Physical Activity:    • Days of Exercise per Week:    • Minutes of Exercise per Session:    Stress:    • Feeling of Stress :    Social Connections:    • Frequency of Communication with Friends and Family:    • Frequency of Social Gatherings with Friends and Family:    • Attends [employer] Services:    • Active Member of Clubs or Organizations:    • Attends Club or Organization Meetings:    • Marital Status:    Intimate Partner Violence:    • Fear of Current or Ex-Partner:    • Emotionally Abused:    • Physically Abused:    • Sexually Abused:      No Known Allergies  Outpatient Encounter Medications as of 6/1/2021   Medication Sig Dispense Refill   • rosuvastatin (CRESTOR) 10 MG Tab TAKE 1 TABLET BY MOUTH ON MONDAY, WEDNESDAY AND FRIDAY 30 Tab 6   • aspirin EC 81 MG EC tablet Take 1 Tab by mouth every day. 30 Tab 3   • diclofenac sodium 1 % Gel APPLY ONE APPLICATION TOPICALLY 4 TIMES DAILY TO AFFECTED AREA AS DIRECTED. (Patient not taking: Reported on 6/1/2021)     • gabapentin (NEURONTIN) 100 MG Cap Take 100 mg by mouth. (Patient not taking: Reported on 6/1/2021)     • lovastatin (MEVACOR) 10 MG tablet Take 10 mg by mouth. (Patient not taking: Reported on 6/1/2021)     • atorvastatin (LIPITOR) 40 MG Tab Take 1 Tab by mouth every day. (Patient not taking: Reported on 6/1/2021) 90 Tab 3     No facility-administered encounter medications on file as of 6/1/2021.     Review of Systems   Constitutional: Negative.    HENT: Negative.    Respiratory: Negative.    Cardiovascular: Negative.    Gastrointestinal: Negative.    Musculoskeletal: Negative.    Skin: Negative.    Neurological: Negative.    Endo/Heme/Allergies: Negative.         Objective:   /62 (BP Location: Left arm, Patient Position: Sitting, BP Cuff Size: Adult)    Pulse 61   Resp 16   Ht 1.829 m (6')   Wt 83.5 kg (184 lb)   SpO2 94%   BMI 24.95 kg/m²     Physical Exam   Constitutional: He is oriented to person, place, and time. No distress.   HENT:   Head: Normocephalic and atraumatic.   Eyes: Pupils are equal, round, and reactive to light. No scleral icterus.   Neck: No JVD present. No tracheal deviation present.   Cardiovascular: Normal rate and regular rhythm.   No murmur heard.  Pulmonary/Chest: Breath sounds normal. No respiratory distress. He has no wheezes.   Abdominal: Soft. Bowel sounds are normal. He exhibits no distension. There is no abdominal tenderness.   Neurological: He is alert and oriented to person, place, and time.   Skin: Skin is warm and dry.       Assessment:     1. Essential hypertension     2. Pure hypercholesterolemia     3. S/P coronary artery bypass graft x 5     4. History of myocardial infarction     5. Coronary artery disease involving native coronary artery of native heart without angina pectoris         Medical Decision Making:  Today's Assessment / Status / Plan:   Status post CABG: He is now 7 years post surgery and doing well.  He is up-to-date on stress testing.  Hyperlipidemia: LDL is at target on current medications.  History of hypertension: Blood pressure is under excellent control on no medications.    Return 1 year.

## 2021-06-08 ENCOUNTER — HOSPITAL ENCOUNTER (OUTPATIENT)
Dept: RADIOLOGY | Facility: MEDICAL CENTER | Age: 71
End: 2021-06-08
Attending: PAIN MEDICINE
Payer: COMMERCIAL

## 2021-06-08 DIAGNOSIS — M79.672 LEFT FOOT PAIN: ICD-10-CM

## 2021-06-08 DIAGNOSIS — M79.671 RIGHT FOOT PAIN: ICD-10-CM

## 2021-06-08 PROCEDURE — 73630 X-RAY EXAM OF FOOT: CPT | Mod: RT

## 2021-06-08 PROCEDURE — 73630 X-RAY EXAM OF FOOT: CPT | Mod: LT

## 2021-11-07 ENCOUNTER — APPOINTMENT (OUTPATIENT)
Dept: RADIOLOGY | Facility: IMAGING CENTER | Age: 71
End: 2021-11-07
Attending: FAMILY MEDICINE
Payer: COMMERCIAL

## 2021-11-07 ENCOUNTER — HOSPITAL ENCOUNTER (OUTPATIENT)
Facility: MEDICAL CENTER | Age: 71
End: 2021-11-07
Attending: FAMILY MEDICINE
Payer: COMMERCIAL

## 2021-11-07 ENCOUNTER — OFFICE VISIT (OUTPATIENT)
Dept: URGENT CARE | Facility: CLINIC | Age: 71
End: 2021-11-07
Payer: COMMERCIAL

## 2021-11-07 VITALS
DIASTOLIC BLOOD PRESSURE: 68 MMHG | OXYGEN SATURATION: 94 % | TEMPERATURE: 99.7 F | HEIGHT: 72 IN | SYSTOLIC BLOOD PRESSURE: 110 MMHG | HEART RATE: 56 BPM | RESPIRATION RATE: 14 BRPM | WEIGHT: 185 LBS | BODY MASS INDEX: 25.06 KG/M2

## 2021-11-07 DIAGNOSIS — R68.89 INFLUENZA-LIKE SYMPTOMS: ICD-10-CM

## 2021-11-07 DIAGNOSIS — J18.9 COMMUNITY ACQUIRED PNEUMONIA OF RIGHT UPPER LOBE OF LUNG: ICD-10-CM

## 2021-11-07 LAB
FLUAV+FLUBV AG SPEC QL IA: NEGATIVE
INT CON NEG: NORMAL
INT CON POS: NORMAL

## 2021-11-07 PROCEDURE — U0005 INFEC AGEN DETEC AMPLI PROBE: HCPCS

## 2021-11-07 PROCEDURE — 71046 X-RAY EXAM CHEST 2 VIEWS: CPT | Mod: TC,FY | Performed by: FAMILY MEDICINE

## 2021-11-07 PROCEDURE — 99204 OFFICE O/P NEW MOD 45 MIN: CPT | Mod: CS | Performed by: FAMILY MEDICINE

## 2021-11-07 PROCEDURE — 87804 INFLUENZA ASSAY W/OPTIC: CPT | Performed by: FAMILY MEDICINE

## 2021-11-07 PROCEDURE — U0003 INFECTIOUS AGENT DETECTION BY NUCLEIC ACID (DNA OR RNA); SEVERE ACUTE RESPIRATORY SYNDROME CORONAVIRUS 2 (SARS-COV-2) (CORONAVIRUS DISEASE [COVID-19]), AMPLIFIED PROBE TECHNIQUE, MAKING USE OF HIGH THROUGHPUT TECHNOLOGIES AS DESCRIBED BY CMS-2020-01-R: HCPCS

## 2021-11-07 RX ORDER — DICLOFENAC EPOLAMINE 0.01 G/1
SYSTEM TOPICAL
COMMUNITY
Start: 2021-08-26 | End: 2022-06-21

## 2021-11-07 RX ORDER — AMOXICILLIN AND CLAVULANATE POTASSIUM 875; 125 MG/1; MG/1
1 TABLET, FILM COATED ORAL 2 TIMES DAILY
Qty: 14 TABLET | Refills: 0 | Status: SHIPPED | OUTPATIENT
Start: 2021-11-07 | End: 2021-11-14

## 2021-11-07 RX ORDER — DOXYCYCLINE HYCLATE 100 MG
100 TABLET ORAL 2 TIMES DAILY
Qty: 14 TABLET | Refills: 0 | Status: SHIPPED | OUTPATIENT
Start: 2021-11-07 | End: 2021-11-14

## 2021-11-07 RX ORDER — DICLOFENAC 16.05 MG/ML
SOLUTION TOPICAL
Status: ON HOLD | COMMUNITY
Start: 2021-09-23 | End: 2022-09-19

## 2021-11-07 RX ORDER — HYDROCODONE BITARTRATE AND ACETAMINOPHEN 5; 325 MG/1; MG/1
TABLET ORAL
COMMUNITY
Start: 2021-10-19 | End: 2022-09-16

## 2021-11-07 ASSESSMENT — FIBROSIS 4 INDEX: FIB4 SCORE: 1.65

## 2021-11-08 LAB — COVID ORDER STATUS COVID19: NORMAL

## 2021-11-08 NOTE — PROGRESS NOTES
Chief Complaint   Patient presents with   • Fatigue     x5 days, bodyache extremely tired    • Diarrhea     x5 days   • Chills     CC:  cough        Cough  This is a new problem. The current episode started 2 days ago. The problem has been unchanged. The problem occurs constantly. The cough is dry. Associated symptoms include : fatigue, muscle aches, fever. Pertinent negatives include no   headaches, nausea, vomiting, diarrhea, sweats, weight loss or wheezing. Nothing aggravates the symptoms.  Patient has tried nothing for the symptoms. There is no history of asthma.        Past Medical History:   Diagnosis Date   • HTN (hypertension) 12/23/2013   • Arrhythmia     REMOTE HISTORY IN HS   • ASTHMA     REMOTE HISTORY   • History of myocardial infarction    • Myocardial infarct (HCC)     CURRENT   • S/P coronary artery bypass graft x 5          Social History     Tobacco Use   • Smoking status: Current Some Day Smoker     Packs/day: 0.50     Types: Cigarettes   • Smokeless tobacco: Never Used   Vaping Use   • Vaping Use: Never used   Substance Use Topics   • Alcohol use: Yes     Comment: SOCIAL   • Drug use: No         Current Outpatient Medications on File Prior to Visit   Medication Sig Dispense Refill   • HYDROcodone-acetaminophen (NORCO) 5-325 MG Tab per tablet      • rosuvastatin (CRESTOR) 10 MG Tab Take 1 tablet by mouth every day. 90 tablet 3   • aspirin EC 81 MG EC tablet Take 1 Tab by mouth every day. 30 Tab 3   • diclofenac epolamine 1.3 % Patch  (Patient not taking: Reported on 11/7/2021)     • Diclofenac Sodium 1.5 % Solution  (Patient not taking: Reported on 11/7/2021)       No current facility-administered medications on file prior to visit.                    Review of Systems   Constitutional: Negative for fever and weight loss.   HENT: negative for otalgia  Cardiovascular - denies chest pain or dyspnea  Respiratory: Positive for cough.  .  Negative for wheezing.    Neurological: Negative for headaches.    GI - denies nausea, vomiting or diarrhea  Neuro - denies numbness or tingling.            Objective:     /68   Pulse (!) 56   Temp 37.6 °C (99.7 °F) (Temporal)   Resp 14   Ht 1.829 m (6')   Wt 83.9 kg (185 lb)   SpO2 94%     Physical Exam   Constitutional: patient is oriented to person, place, and time. Patient appears well-developed and well-nourished. No distress.   HENT:   Head: Normocephalic and atraumatic.   Right Ear: External ear normal.   Left Ear: External ear normal.   Nose: Mucosal edema  present. Right sinus exhibits no maxillary sinus tenderness. Left sinus exhibits no maxillary sinus tenderness.   Mouth/Throat: Mucous membranes are normal. No oral lesions.  No posterior pharyngeal erythema.  No oropharyngeal exudate or posterior oropharyngeal edema.   Eyes: Conjunctivae and EOM are normal. Pupils are equal, round, and reactive to light. Right eye exhibits no discharge. Left eye exhibits no discharge. No scleral icterus.   Neck: Normal range of motion. Neck supple. No tracheal deviation present.   Cardiovascular: Normal rate, regular rhythm and normal heart sounds.  Exam reveals no friction rub.    Pulmonary/Chest: Effort normal. No respiratory distress. Patient has no wheezes or rhonchi. Patient has no rales.    Musculoskeletal:  exhibits no edema.   Lymphadenopathy:     Patient has no cervical adenopathy.      Neurological: patient is alert and oriented to person, place, and time.   Skin: Skin is warm and dry. No rash noted. No erythema.   Psychiatric: patient  has a normal mood and affect.  behavior is normal.   Nursing note and vitals reviewed.              Assessment/Plan:       1. Influenza-like symptoms  ***  - POCT Influenza A/B  - SARS-CoV-2 PCR (24 hour In-House): Collect NP swab in VTM; Future  - DX-CHEST-2 VIEWS; Future       1. Viral URI   rapid strep negative.   Rx motrin 600mg tid, prn  Follow up in one week if no improvement

## 2021-11-08 NOTE — PROGRESS NOTES
Cc:  Fatigue, slight cough, chills, loose stools.         Cough  This is a new problem. The current episode started 4 days ago. The problem has been unchanged. The problem occurs constantly. The cough is dry. Associated symptoms include : fatigue,  loose stools, subj fever. Pertinent negatives include no   Sob, nausea, vomiting,  , sweats, weight loss or wheezing. Nothing aggravates the symptoms.  Patient has tried nothing for the symptoms. There is no history of asthma.        Past Medical History:   Diagnosis Date   • HTN (hypertension) 12/23/2013   • Arrhythmia     REMOTE HISTORY IN HS   • ASTHMA     REMOTE HISTORY   • History of myocardial infarction    • Myocardial infarct (HCC)     CURRENT   • S/P coronary artery bypass graft x 5          Social History     Tobacco Use   • Smoking status: Current Some Day Smoker     Packs/day: 0.50     Types: Cigarettes   • Smokeless tobacco: Never Used   Vaping Use   • Vaping Use: Never used   Substance Use Topics   • Alcohol use: Yes     Comment: SOCIAL   • Drug use: No         Current Outpatient Medications on File Prior to Visit   Medication Sig Dispense Refill   • HYDROcodone-acetaminophen (NORCO) 5-325 MG Tab per tablet      • rosuvastatin (CRESTOR) 10 MG Tab Take 1 tablet by mouth every day. 90 tablet 3   • aspirin EC 81 MG EC tablet Take 1 Tab by mouth every day. 30 Tab 3   • diclofenac epolamine 1.3 % Patch  (Patient not taking: Reported on 11/7/2021)     • Diclofenac Sodium 1.5 % Solution  (Patient not taking: Reported on 11/7/2021)       No current facility-administered medications on file prior to visit.                    Review of Systems      HENT: negative for otalgia  Cardiovascular - denies chest pain or dyspnea  Respiratory: Positive for cough.  .  Negative for wheezing.    Neurological: Negative for headaches.   GI - denies nausea, vomiting    Neuro - denies numbness or tingling.            Objective:     /68   Pulse (!) 56   Temp 37.6 °C (99.7  °F) (Temporal)   Resp 14   Ht 1.829 m (6')   Wt 83.9 kg (185 lb)   SpO2 94%     Physical Exam   Constitutional: patient is oriented to person, place, and time. Patient appears well-developed and well-nourished. No distress.   HENT:   Head: Normocephalic and atraumatic.   Right Ear: External ear normal.   Left Ear: External ear normal.   Nose: Mucosal edema  present. Right sinus exhibits no maxillary sinus tenderness. Left sinus exhibits no maxillary sinus tenderness.   Mouth/Throat: Mucous membranes are normal. No oral lesions.  No posterior pharyngeal erythema.  No oropharyngeal exudate or posterior oropharyngeal edema.   Eyes: Conjunctivae and EOM are normal. Pupils are equal, round, and reactive to light. Right eye exhibits no discharge. Left eye exhibits no discharge. No scleral icterus.   Neck: Normal range of motion. Neck supple. No tracheal deviation present.   Cardiovascular: Normal rate, regular rhythm and normal heart sounds.  Exam reveals no friction rub.    Pulmonary/Chest: Effort normal. No respiratory distress. Patient has no wheezes or rhonchi. Patient has no rales.    Musculoskeletal:  exhibits no edema.   Lymphadenopathy:     Patient has no cervical adenopathy.      Neurological: patient is alert and oriented to person, place, and time.   Skin: Skin is warm and dry. No rash noted. No erythema.   Psychiatric: patient  has a normal mood and affect.  behavior is normal.   Nursing note and vitals reviewed.         DX-CHEST-2 VIEWS  Order: [id number]   Status: Final result     Visible to patient: No (scheduled for 11/8/2021  2:58 PM)     Next appt: None     Dx: Influenza-like symptoms     0 Result Notes    Details    Reading Physician Reading Date Result Priority   Wei Howe M.D.  [phone] 11/7/2021 Ur[facility]     Narrative & Impression     11/7/2021 4:51 PM     HISTORY/REASON FOR EXAM: cough; Cough.     TECHNIQUE/EXAM DESCRIPTION AND NUMBER OF VIEWS:  Two views of the  chest.     COMPARISON:  12/12/2013.     FINDINGS:  Right upper lobe anterior segment consolidation moderate severity is new. Lung volumes are large     Cardiomediastinal contours are improved with decreased cardiac silhouette size following sternotomy.     No pleural space process is evident.     IMPRESSION:     Right upper lobe dense consolidation is most consistent with community acquired pneumonia. Recommend follow-up to resolution     Hyperinflation post sternotomy with decreased cardiac silhouette size             Exam Ended: 11/07/21  4:51 PM Last Resulted: 11/07/21  4:56 PM                Assessment/Plan:         1.  Community acquired pneumonia of right upper lobe of lung     Chest x-ray was personally interpreted and reviewed. Findings c/w RUL pneumonia      - amoxicillin-clavulanate (AUGMENTIN) 875-125 MG Tab; Take 1 Tablet by mouth 2 times a day for 7 days.  Dispense: 14 Tablet; Refill: 0  - doxycycline (VIBRAMYCIN) 100 MG Tab; Take 1 Tablet by mouth 2 times a day for 7 days.  Dispense: 14 Tablet; Refill: 0      Repeat cxr in one week      Advised to go to ED if worsening sx.         Will send screen for COVID  Home isolation per CDC guidelines           My total time spent caring for the patient on the day of the encounter was at least 45 minutes.   This does not include time spent on separately billable procedures/tests.

## 2021-11-09 LAB
SARS-COV-2 RNA RESP QL NAA+PROBE: NOTDETECTED
SPECIMEN SOURCE: NORMAL

## 2021-11-14 ENCOUNTER — APPOINTMENT (OUTPATIENT)
Dept: RADIOLOGY | Facility: IMAGING CENTER | Age: 71
End: 2021-11-14
Attending: FAMILY MEDICINE
Payer: COMMERCIAL

## 2021-11-14 PROCEDURE — 71046 X-RAY EXAM CHEST 2 VIEWS: CPT | Mod: TC,FY | Performed by: FAMILY MEDICINE

## 2021-12-13 ENCOUNTER — HOSPITAL ENCOUNTER (OUTPATIENT)
Dept: RADIOLOGY | Facility: MEDICAL CENTER | Age: 71
End: 2021-12-13
Attending: FAMILY MEDICINE
Payer: COMMERCIAL

## 2021-12-13 DIAGNOSIS — J84.10 PULMONARY FIBROSIS (HCC): ICD-10-CM

## 2021-12-13 PROCEDURE — 71250 CT THORAX DX C-: CPT

## 2021-12-14 ENCOUNTER — HOSPITAL ENCOUNTER (OUTPATIENT)
Dept: LAB | Facility: MEDICAL CENTER | Age: 71
End: 2021-12-14
Attending: FAMILY MEDICINE
Payer: COMMERCIAL

## 2021-12-14 LAB
ALBUMIN SERPL BCP-MCNC: 4.4 G/DL (ref 3.2–4.9)
ALBUMIN/GLOB SERPL: 1.3 G/DL
ALP SERPL-CCNC: 71 U/L (ref 30–99)
ALT SERPL-CCNC: 13 U/L (ref 2–50)
ANION GAP SERPL CALC-SCNC: 9 MMOL/L (ref 7–16)
AST SERPL-CCNC: 16 U/L (ref 12–45)
BILIRUB SERPL-MCNC: 0.7 MG/DL (ref 0.1–1.5)
BUN SERPL-MCNC: 14 MG/DL (ref 8–22)
CALCIUM SERPL-MCNC: 10 MG/DL (ref 8.5–10.5)
CHLORIDE SERPL-SCNC: 103 MMOL/L (ref 96–112)
CHOLEST SERPL-MCNC: 193 MG/DL (ref 100–199)
CO2 SERPL-SCNC: 28 MMOL/L (ref 20–33)
CREAT SERPL-MCNC: 0.89 MG/DL (ref 0.5–1.4)
EST. AVERAGE GLUCOSE BLD GHB EST-MCNC: 123 MG/DL
FASTING STATUS PATIENT QL REPORTED: NORMAL
GLOBULIN SER CALC-MCNC: 3.4 G/DL (ref 1.9–3.5)
GLUCOSE SERPL-MCNC: 76 MG/DL (ref 65–99)
HBA1C MFR BLD: 5.9 % (ref 4–5.6)
HDLC SERPL-MCNC: 57 MG/DL
LDLC SERPL CALC-MCNC: 110 MG/DL
POTASSIUM SERPL-SCNC: 5.3 MMOL/L (ref 3.6–5.5)
PROT SERPL-MCNC: 7.8 G/DL (ref 6–8.2)
PSA SERPL-MCNC: 0.91 NG/ML (ref 0–4)
SODIUM SERPL-SCNC: 140 MMOL/L (ref 135–145)
TRIGL SERPL-MCNC: 131 MG/DL (ref 0–149)

## 2021-12-14 PROCEDURE — 36415 COLL VENOUS BLD VENIPUNCTURE: CPT

## 2021-12-14 PROCEDURE — 80053 COMPREHEN METABOLIC PANEL: CPT

## 2021-12-14 PROCEDURE — 80061 LIPID PANEL: CPT

## 2021-12-14 PROCEDURE — 83036 HEMOGLOBIN GLYCOSYLATED A1C: CPT

## 2021-12-14 PROCEDURE — 84153 ASSAY OF PSA TOTAL: CPT

## 2022-04-04 ENCOUNTER — APPOINTMENT (RX ONLY)
Dept: URBAN - METROPOLITAN AREA CLINIC 6 | Facility: CLINIC | Age: 72
Setting detail: DERMATOLOGY
End: 2022-04-04

## 2022-04-04 DIAGNOSIS — L57.0 ACTINIC KERATOSIS: ICD-10-CM

## 2022-04-04 DIAGNOSIS — L81.4 OTHER MELANIN HYPERPIGMENTATION: ICD-10-CM

## 2022-04-04 DIAGNOSIS — Z71.89 OTHER SPECIFIED COUNSELING: ICD-10-CM

## 2022-04-04 DIAGNOSIS — L82.1 OTHER SEBORRHEIC KERATOSIS: ICD-10-CM

## 2022-04-04 DIAGNOSIS — D18.0 HEMANGIOMA: ICD-10-CM

## 2022-04-04 DIAGNOSIS — D22 MELANOCYTIC NEVI: ICD-10-CM

## 2022-04-04 PROBLEM — D22.5 MELANOCYTIC NEVI OF TRUNK: Status: ACTIVE | Noted: 2022-04-04

## 2022-04-04 PROBLEM — D18.01 HEMANGIOMA OF SKIN AND SUBCUTANEOUS TISSUE: Status: ACTIVE | Noted: 2022-04-04

## 2022-04-04 PROBLEM — D48.5 NEOPLASM OF UNCERTAIN BEHAVIOR OF SKIN: Status: ACTIVE | Noted: 2022-04-04

## 2022-04-04 PROCEDURE — 17004 DESTROY PREMAL LESIONS 15/>: CPT

## 2022-04-04 PROCEDURE — 99213 OFFICE O/P EST LOW 20 MIN: CPT | Mod: 25

## 2022-04-04 PROCEDURE — ? LIQUID NITROGEN

## 2022-04-04 PROCEDURE — ? SUNSCREEN RECOMMENDATIONS

## 2022-04-04 PROCEDURE — ? COUNSELING

## 2022-04-04 PROCEDURE — 11102 TANGNTL BX SKIN SINGLE LES: CPT | Mod: 59

## 2022-04-04 PROCEDURE — 11103 TANGNTL BX SKIN EA SEP/ADDL: CPT

## 2022-04-04 PROCEDURE — ? BIOPSY BY SHAVE METHOD

## 2022-04-04 ASSESSMENT — LOCATION DETAILED DESCRIPTION DERM
LOCATION DETAILED: RIGHT SUPERIOR MEDIAL UPPER BACK
LOCATION DETAILED: LEFT ULNAR DORSAL HAND
LOCATION DETAILED: INFERIOR MID FOREHEAD
LOCATION DETAILED: EPIGASTRIC SKIN
LOCATION DETAILED: STERNAL NOTCH
LOCATION DETAILED: INFERIOR THORACIC SPINE
LOCATION DETAILED: RIGHT POSTERIOR ANKLE
LOCATION DETAILED: RIGHT LATERAL DISTAL PRETIBIAL REGION
LOCATION DETAILED: RIGHT MEDIAL PROXIMAL PRETIBIAL REGION
LOCATION DETAILED: LEFT MEDIAL DISTAL PRETIBIAL REGION
LOCATION DETAILED: RIGHT CENTRAL PARIETAL SCALP
LOCATION DETAILED: LEFT CENTRAL TEMPLE
LOCATION DETAILED: RIGHT RADIAL DORSAL HAND
LOCATION DETAILED: LEFT RADIAL DORSAL HAND
LOCATION DETAILED: LEFT SUPERIOR HELIX
LOCATION DETAILED: SUPERIOR MID FOREHEAD
LOCATION DETAILED: RIGHT INFERIOR POSTAURICULAR SKIN
LOCATION DETAILED: RIGHT SUPERIOR PARIETAL SCALP
LOCATION DETAILED: RIGHT LATERAL FOREHEAD
LOCATION DETAILED: LEFT FOREHEAD
LOCATION DETAILED: LEFT SUPERIOR FOREHEAD
LOCATION DETAILED: RIGHT CENTRAL TEMPLE
LOCATION DETAILED: RIGHT SUPERIOR OCCIPITAL SCALP
LOCATION DETAILED: LEFT INFERIOR FOREHEAD
LOCATION DETAILED: LEFT SUPERIOR PARIETAL SCALP

## 2022-04-04 ASSESSMENT — LOCATION SIMPLE DESCRIPTION DERM
LOCATION SIMPLE: RIGHT HAND
LOCATION SIMPLE: RIGHT OCCIPITAL SCALP
LOCATION SIMPLE: RIGHT PRETIBIAL REGION
LOCATION SIMPLE: SCALP
LOCATION SIMPLE: LEFT PRETIBIAL REGION
LOCATION SIMPLE: RIGHT ANKLE
LOCATION SIMPLE: UPPER BACK
LOCATION SIMPLE: LEFT HAND
LOCATION SIMPLE: RIGHT TEMPLE
LOCATION SIMPLE: CHEST
LOCATION SIMPLE: LEFT EAR
LOCATION SIMPLE: LEFT FOREHEAD
LOCATION SIMPLE: LEFT TEMPLE
LOCATION SIMPLE: ABDOMEN
LOCATION SIMPLE: RIGHT UPPER BACK
LOCATION SIMPLE: SUPERIOR FOREHEAD
LOCATION SIMPLE: INFERIOR FOREHEAD
LOCATION SIMPLE: RIGHT FOREHEAD

## 2022-04-04 ASSESSMENT — LOCATION ZONE DERM
LOCATION ZONE: FACE
LOCATION ZONE: SCALP
LOCATION ZONE: LEG
LOCATION ZONE: HAND
LOCATION ZONE: EAR
LOCATION ZONE: TRUNK

## 2022-04-04 NOTE — PROCEDURE: COUNSELING
Detail Level: Generalized
Sunscreen Recommendations: Recommended broad spectrum inorganic or physical sunscreens primarily containing zinc oxide or titanium dioxide.
Detail Level: Detailed

## 2022-04-04 NOTE — PROCEDURE: LIQUID NITROGEN
Duration Of Freeze Thaw-Cycle (Seconds): 8
Post-Care Instructions: I reviewed with the patient in detail post-care instructions. Patient is to wear sunprotection, and avoid picking at any of the treated lesions. Pt may apply Vaseline to crusted or scabbing areas.
Render Note In Bullet Format When Appropriate: No
Show Applicator Variable?: Yes
Consent: The patient's consent was obtained including but not limited to risks of crusting, scabbing, blistering, scarring, darker or lighter pigmentary change, recurrence, incomplete removal and infection.
Detail Level: Zone
Number Of Freeze-Thaw Cycles: 2 freeze-thaw cycles

## 2022-04-04 NOTE — PROCEDURE: SUNSCREEN RECOMMENDATIONS

## 2022-05-31 ENCOUNTER — APPOINTMENT (RX ONLY)
Dept: URBAN - METROPOLITAN AREA CLINIC 36 | Facility: CLINIC | Age: 72
Setting detail: DERMATOLOGY
End: 2022-05-31

## 2022-05-31 PROBLEM — C44.719 BASAL CELL CARCINOMA OF SKIN OF LEFT LOWER LIMB, INCLUDING HIP: Status: ACTIVE | Noted: 2022-05-31

## 2022-05-31 PROCEDURE — ? MOHS SURGERY

## 2022-05-31 PROCEDURE — 17313 MOHS 1 STAGE T/A/L: CPT

## 2022-05-31 PROCEDURE — 17314 MOHS ADDL STAGE T/A/L: CPT

## 2022-05-31 NOTE — PROCEDURE: MIPS QUALITY
Detail Level: Detailed
Quality 111:Pneumonia Vaccination Status For Older Adults: Pneumococcal Vaccination Previously Received
Quality 226: Preventive Care And Screening: Tobacco Use: Screening And Cessation Intervention: Patient screened for tobacco use and is an ex/non-smoker
Quality 130: Documentation Of Current Medications In The Medical Record: Current Medications Documented
Quality 226: Preventive Care And Screening: Tobacco Use: Screening And Cessation Intervention: Patient screened for tobacco use, is a smoker AND received Cessation Counseling within the Previous 12 Months

## 2022-05-31 NOTE — PROCEDURE: MOHS SURGERY
Mohs Case Number: [id number]
Previous Accession (Optional): [id number]
Biopsy Photograph Reviewed: Yes
Referring Physician (Optional): torin
Consent Type: Consent 1 (Standard)
Eye Shield Used: No
Surgeon Performing Repair (Optional): Ronald
Initial Size Of Lesion: 0.8
Number Of Stages: 6
Primary Defect Length In Cm (Final Defect Size - Required For Flaps/Grafts): 2.8
Primary Defect Width In Cm (Final Defect Size - Required For Flaps/Grafts): 2.6
Repair Type: No repair - secondary intention
Oculoplastic Surgeon (A): Benson
Oculoplastic Surgeon Procedure Text (A): After obtaining clear surgical margins the patient was sent to oculoplastics for surgical repair.  The patient understands they will receive post-surgical care and follow-up from the referring physician's office.
Otolaryngologist Procedure Text (A): After obtaining clear surgical margins the patient was sent to otolaryngology for surgical repair.  The patient understands they will receive post-surgical care and follow-up from the referring physician's office.
Plastic Surgeon Procedure Text (A): After obtaining clear surgical margins the patient was sent to plastics for surgical repair.  The patient understands they will receive post-surgical care and follow-up from the referring physician's office.
Mid-Level Procedure Text (A): After obtaining clear surgical margins the patient was sent to a mid-level provider for surgical repair.  The patient understands they will receive post-surgical care and follow-up from the mid-level provider.
Provider Procedure Text (A): After obtaining clear surgical margins the defect was repaired by another provider.
Asc Procedure Text (A): After obtaining clear surgical margins the patient was sent to an ASC for surgical repair.  The patient understands they will receive post-surgical care and follow-up from the ASC physician.
Simple / Intermediate / Complex Repair - Final Wound Length In Cm: 0
Suturegard Retention Suture: 2-0 Nylon
Retention Suture Bite Size: 3 mm
Length To Time In Minutes Device Was In Place: 10
Number Of Hemigard Strips Per Side: 1
Undermining Type: Entire Wound
Debridement Text: The wound edges were debrided prior to proceeding with the closure to facilitate wound healing.
Helical Rim Text: The closure involved the helical rim.
Vermilion Border Text: The closure involved the vermilion border.
Nostril Rim Text: The closure involved the nostril rim.
Retention Suture Text: Retention sutures were placed to support the closure and prevent dehiscence.
Area H Indication Text: Tumors in this location are included in Area H (eyelids, eyebrows, nose, lips, chin, ear, pre-auricular, post-auricular, temple, genitalia, hands, feet, ankles and areola).  Tissue conservation is critical in these anatomic locations.
Area M Indication Text: Tumors in this location are included in Area M (cheek, forehead, scalp, neck, jawline and pretibial skin).  Mohs surgery is indicated for tumors in these anatomic locations.
Area L Indication Text: Tumors in this location are included in Area L (trunk and extremities).  Mohs surgery is indicated for larger tumors, or tumors with aggressive histologic features, in these anatomic locations.
Depth Of Tumor Invasion (For Histology): adipose tissue
Perineural Invasion (For Histology - Be Specific If Possible): absent
Surgical Defect Length In Cm (Optional): 1.5
Surgical Defect Width In Cm (Optional): 1.3
Special Stains Stage 1 - Results: Base On Clearance Noted Above
Stage 2: Additional Anesthesia Type: 1% lidocaine with 1:100,000 epinephrine and 408mcg clindamycin/ml and a 1:10 solution of 8.4% sodium bicarbonate
Surgical Defect Length In Cm (Optional): 1.8
Surgical Defect Width In Cm (Optional): 1.6
Surgical Defect Length In Cm (Optional): 2.0
Stage 4: Additional Anesthesia Type: 1% lidocaine with epinephrine
Surgical Defect Length In Cm (Optional): 2.2
Include Tumor Staging In Mohs Note?: Please Select the Appropriate Response
Staging Info: By selecting yes to the question above you will include information on AJCC 8 tumor staging in your Mohs note. Information on tumor staging will be automatically added for SCCs on the head and neck. AJCC 8 includes tumor size, tumor depth, perineural involvement and bone invasion.
Tumor Depth: Less than 6mm from granular layer and no invasion beyond the subcutaneous fat
Medical Necessity Statement: Based on my medical judgement, Mohs surgery is the most appropriate treatment for this cancer compared to other treatments.
Alternatives Discussed Intro (Do Not Add Period): I discussed alternative treatments to Mohs surgery and specifically discussed the risks and benefits of
Consent 1/Introductory Paragraph: The rationale for Mohs was explained to the patient and consent was obtained. The risks, benefits and alternatives to therapy were discussed in detail. Specifically, the risks of infection, scarring, bleeding, prolonged wound healing, incomplete removal, allergy to anesthesia, nerve injury and recurrence were addressed. Prior to the procedure, the treatment site was clearly identified and confirmed by the patient. All components of Universal Protocol/PAUSE Rule completed.
Consent 2/Introductory Paragraph: Mohs surgery was explained to the patient and consent was obtained. The risks, benefits and alternatives to therapy were discussed in detail. Specifically, the risks of infection, scarring, bleeding, prolonged wound healing, incomplete removal, allergy to anesthesia, nerve injury and recurrence were addressed. Prior to the procedure, the treatment site was clearly identified and confirmed by the patient. All components of Universal Protocol/PAUSE Rule completed.
Consent 3/Introductory Paragraph: I gave the patient a chance to ask questions they had about the procedure.  Following this I explained the Mohs procedure and consent was obtained. The risks, benefits and alternatives to therapy were discussed in detail. Specifically, the risks of infection, scarring, bleeding, prolonged wound healing, incomplete removal, allergy to anesthesia, nerve injury and recurrence were addressed. Prior to the procedure, the treatment site was clearly identified and confirmed by the patient. All components of Universal Protocol/PAUSE Rule completed.
Consent (Temporal Branch)/Introductory Paragraph: The rationale for Mohs was explained to the patient and consent was obtained. The risks, benefits and alternatives to therapy were discussed in detail. Specifically, the risks of damage to the temporal branch of the facial nerve, infection, scarring, bleeding, prolonged wound healing, incomplete removal, allergy to anesthesia, and recurrence were addressed. Prior to the procedure, the treatment site was clearly identified and confirmed by the patient. All components of Universal Protocol/PAUSE Rule completed.
Consent (Marginal Mandibular)/Introductory Paragraph: The rationale for Mohs was explained to the patient and consent was obtained. The risks, benefits and alternatives to therapy were discussed in detail. Specifically, the risks of damage to the marginal mandibular branch of the facial nerve, infection, scarring, bleeding, prolonged wound healing, incomplete removal, allergy to anesthesia, and recurrence were addressed. Prior to the procedure, the treatment site was clearly identified and confirmed by the patient. All components of Universal Protocol/PAUSE Rule completed.
Consent (Spinal Accessory)/Introductory Paragraph: The rationale for Mohs was explained to the patient and consent was obtained. The risks, benefits and alternatives to therapy were discussed in detail. Specifically, the risks of damage to the spinal accessory nerve, infection, scarring, bleeding, prolonged wound healing, incomplete removal, allergy to anesthesia, and recurrence were addressed. Prior to the procedure, the treatment site was clearly identified and confirmed by the patient. All components of Universal Protocol/PAUSE Rule completed.
Consent (Near Eyelid Margin)/Introductory Paragraph: The rationale for Mohs was explained to the patient and consent was obtained. The risks, benefits and alternatives to therapy were discussed in detail. Specifically, the risks of ectropion or eyelid deformity, infection, scarring, bleeding, prolonged wound healing, incomplete removal, allergy to anesthesia, nerve injury and recurrence were addressed. Prior to the procedure, the treatment site was clearly identified and confirmed by the patient. All components of Universal Protocol/PAUSE Rule completed.
Consent (Ear)/Introductory Paragraph: The rationale for Mohs was explained to the patient and consent was obtained. The risks, benefits and alternatives to therapy were discussed in detail. Specifically, the risks of ear deformity, infection, scarring, bleeding, prolonged wound healing, incomplete removal, allergy to anesthesia, nerve injury and recurrence were addressed. Prior to the procedure, the treatment site was clearly identified and confirmed by the patient. All components of Universal Protocol/PAUSE Rule completed.
Consent (Nose)/Introductory Paragraph: The rationale for Mohs was explained to the patient and consent was obtained. The risks, benefits and alternatives to therapy were discussed in detail. Specifically, the risks of nasal deformity, changes in the flow of air through the nose, infection, scarring, bleeding, prolonged wound healing, incomplete removal, allergy to anesthesia, nerve injury and recurrence were addressed. Prior to the procedure, the treatment site was clearly identified and confirmed by the patient. All components of Universal Protocol/PAUSE Rule completed.
Consent (Lip)/Introductory Paragraph: The rationale for Mohs was explained to the patient and consent was obtained. The risks, benefits and alternatives to therapy were discussed in detail. Specifically, the risks of lip deformity, changes in the oral aperture, infection, scarring, bleeding, prolonged wound healing, incomplete removal, allergy to anesthesia, nerve injury and recurrence were addressed. Prior to the procedure, the treatment site was clearly identified and confirmed by the patient. All components of Universal Protocol/PAUSE Rule completed.
Consent (Scalp)/Introductory Paragraph: The rationale for Mohs was explained to the patient and consent was obtained. The risks, benefits and alternatives to therapy were discussed in detail. Specifically, the risks of changes in hair growth pattern secondary to repair, infection, scarring, bleeding, prolonged wound healing, incomplete removal, allergy to anesthesia, nerve injury and recurrence were addressed. Prior to the procedure, the treatment site was clearly identified and confirmed by the patient. All components of Universal Protocol/PAUSE Rule completed.
Detail Level: Detailed
Postop Diagnosis: same
Anesthesia Type: 0.5% lidocaine with 1:200,000 epinephrine and a 1:10 solution of 8.4% sodium bicarbonate and 408mcg clindamycin/ml
Hemostasis: Electrocautery
Estimated Blood Loss (Cc): less than 5 cc
Surgical Defect Length In Cm (Optional): 2.5
Surgical Defect Width In Cm (Optional): 2.3
Repair Anesthesia Method: local infiltration
Brow Lift Text: A midfrontal incision was made medially to the defect to allow access to the tissues just superior to the left eyebrow. Following careful dissection inferiorly in a supraperiosteal plane to the level of the left eyebrow, several 3-0 monocryl sutures were used to resuspend the eyebrow orbicularis oculi muscular unit to the superior frontal bone periosteum. This resulted in an appropriate reapproximation of static eyebrow symmetry and correction of the left brow ptosis.
Deep Sutures: 5-0 Polysorb
Epidermal Sutures: 5-0 Prolene
Epidermal Closure: running cuticular
Suturegard Intro: Intraoperative tissue expansion was performed, utilizing the SUTUREGARD device, in order to reduce wound tension.
Suturegard Body: The suture ends were repeatedly re-tightened and re-clamped to achieve the desired tissue expansion.
Hemigard Intro: Due to skin fragility and wound tension, it was decided to use HEMIGARD adhesive retention suture devices to permit a linear closure. The skin was cleaned and dried for a 6cm distance away from the wound. Excessive hair, if present, was removed to allow for adhesion.
Hemigard Postcare Instructions: The HEMIGARD strips are to remain completely dry for at least 5-7 days.
Donor Site Anesthesia Type: same as repair anesthesia
Graft Basting Suture (Optional): 5-0 Fast Absorbing Gut
Graft Donor Site Epidermal Sutures (Optional): 5-0 Ethibond
Epidermal Closure Graft Donor Site (Optional): simple interrupted
Graft Donor Site Bandage (Optional-Leave Blank If You Don't Want In Note): Aquaphor and telefa placed on wound. Pressure dressing applied to donor site
Closure 2 Information: This tab is for additional flaps and grafts, including complex repair and grafts and complex repair and flaps. You can also specify a different location for the additional defect, if the location is the same you do not need to select a new one. We will insert the automated text for the repair you select below just as we do for solitary flaps and grafts. Please note that at this time if you select a location with a different insurance zone you will need to override the ICD10 and CPT if appropriate.
Closure 3 Information: This tab is for additional flaps and grafts above and beyond our usual structured repairs.  Please note if you enter information here it will not currently bill and you will need to add the billing information manually.
Wound Care: Aquaphor
Dressing: dry sterile dressing
Wound Care (No Sutures): Petrolatum
Suture Removal: 7 days
Unna Boot Text: An Unna boot was placed to help immobilize the limb and facilitate more rapid healing.
Home Suture Removal Text: Patient was provided instructions on removing sutures and will remove their sutures at home.  If they have any questions or difficulties they will call the office.
Post-Care Instructions: I reviewed with the patient in detail post-care instructions. Patient is not to engage in any heavy lifting, exercise, or swimming for the next 14 days. Should the patient develop any fevers, chills, bleeding, severe pain patient will contact the office immediately.
Pain Refusal Text: I offered to prescribe pain medication but the patient refused to take this medication.
Mauc Instructions: By selecting yes to the question below the MAUC number will be added into the note.  This will be calculated automatically based on the diagnosis chosen, the size entered, the body zone selected (H,M,L) and the specific indications you chose. You will also have the option to override the Mohs AUC if you disagree with the automatically calculated number and this option is found in the Case Summary tab.
Where Do You Want The Question To Include Opioid Counseling Located?: Case Summary Tab
Eye Protection Verbiage: Before proceeding with the stage, a plastic scleral shield was inserted. The globe was anesthetized with a few drops of 1% lidocaine with 1:100,000 epinephrine. Then, an appropriate sized scleral shield was chosen and coated with lacrilube ointment. The shield was gently inserted and left in place for the duration of each stage. After the stage was completed, the shield was gently removed.
Mohs Method Verbiage: An incision at a 45 degree angle following the standard Mohs approach was done and the specimen was harvested as a microscopic controlled layer.
Surgeon/Pathologist Verbiage (Will Incorporate Name Of Surgeon From Intro If Not Blank): operated in two distinct and integrated capacities as the surgeon and pathologist.
Mohs Histo Method Verbiage: Each section was then chromacoded and processed in the Mohs lab using the Mohs protocol and submitted for frozen section.
Subsequent Stages Histo Method Verbiage: Using a similar technique to that described above, a thin layer of tissue was removed from all areas where tumor was visible on the previous stage.  The tissue was again oriented, mapped, dyed, and processed as above.
Mohs Rapid Report Verbiage: The area of clinically evident tumor was marked with skin marking ink and appropriately hatched.  The initial incision was made following the Mohs approach through the skin.  The specimen was taken to the lab, divided into the necessary number of pieces, chromacoded and processed according to the Mohs protocol.  This was repeated in successive stages until a tumor free defect was achieved.
Complex Repair Preamble Text (Leave Blank If You Do Not Want): Extensive wide undermining was performed at least 2 cm in all directions.
Intermediate Repair Preamble Text (Leave Blank If You Do Not Want): Undermining was performed with blunt dissection.
M-Plasty Complex Repair Preamble Text (Leave Blank If You Do Not Want): Extensive wide undermining was performed.
Non-Graft Cartilage Fenestration Text: The cartilage was fenestrated with a 2mm punch biopsy to help facilitate healing.
Graft Cartilage Fenestration Text: The cartilage was fenestrated with a 2mm punch biopsy to help facilitate graft survival and healing.
Secondary Intention Text (Leave Blank If You Do Not Want): The defect will heal with secondary intention.
No Repair - Repaired With Adjacent Surgical Defect Text (Leave Blank If You Do Not Want): After obtaining clear surgical margins the defect was repaired concurrently with another surgical defect which was in close approximation.
Adjacent Tissue Transfer Text: The defect edges were debeveled with a #15 scalpel blade.  Given the location of the defect and the proximity to free margins an adjacent tissue transfer was deemed most appropriate.  Using a sterile surgical marker, an appropriate flap was drawn incorporating the defect and placing the expected incisions within the relaxed skin tension lines where possible.    The area thus outlined was incised deep to adipose tissue with a #15 scalpel blade.  The skin margins were undermined to an appropriate distance in all directions utilizing iris scissors.
Advancement Flap (Single) Text: The defect edges were debeveled with a #15 scalpel blade.  Given the location of the defect and the proximity to free margins a single advancement flap was deemed most appropriate.  Using a sterile surgical marker, an appropriate advancement flap was drawn incorporating the defect and placing the expected incisions within the relaxed skin tension lines where possible.    The area thus outlined was incised deep to adipose tissue with a #15 scalpel blade.  The skin margins were undermined to an appropriate distance in all directions utilizing iris scissors.
Advancement Flap (Double) Text: The defect edges were debeveled with a #15 scalpel blade.  Given the location of the defect and the proximity to free margins a double advancement flap was deemed most appropriate.  Using a sterile surgical marker, the appropriate advancement flaps were drawn incorporating the defect and placing the expected incisions within the relaxed skin tension lines where possible.    The area thus outlined was incised deep to adipose tissue with a #15 scalpel blade.  The skin margins were undermined to an appropriate distance in all directions utilizing iris scissors.
Burow's Advancement Flap Text: The defect edges were debeveled with a #15 scalpel blade.  Given the location of the defect and the proximity to free margins a Burow's advancement flap was deemed most appropriate.  Using a sterile surgical marker, the appropriate advancement flap was drawn incorporating the defect and placing the expected incisions within the relaxed skin tension lines where possible.    The area thus outlined was incised deep to adipose tissue with a #15 scalpel blade.  The skin margins were undermined to an appropriate distance in all directions utilizing iris scissors.
Chonodrocutaneous Helical Advancement Flap Text: The defect edges were debeveled with a #15 scalpel blade.  Given the location of the defect and the proximity to free margins a chondrocutaneous helical advancement flap was deemed most appropriate.  Using a sterile surgical marker, the appropriate advancement flap was drawn incorporating the defect and placing the expected incisions within the relaxed skin tension lines where possible.    The area thus outlined was incised deep to adipose tissue with a #15 scalpel blade.  The skin margins were undermined to an appropriate distance in all directions utilizing iris scissors.
Crescentic Advancement Flap Text: The defect edges were debeveled with a #15 scalpel blade.  Given the location of the defect and the proximity to free margins a crescentic advancement flap was deemed most appropriate.  Using a sterile surgical marker, the appropriate advancement flap was drawn incorporating the defect and placing the expected incisions within the relaxed skin tension lines where possible.    The area thus outlined was incised deep to adipose tissue with a #15 scalpel blade.  The skin margins were undermined to an appropriate distance in all directions utilizing iris scissors.
A-T Advancement Flap Text: The defect edges were debeveled with a #15 scalpel blade.  Given the location of the defect, shape of the defect and the proximity to free margins an A-T advancement flap was deemed most appropriate.  Using a sterile surgical marker, an appropriate advancement flap was drawn incorporating the defect and placing the expected incisions within the relaxed skin tension lines where possible.    The area thus outlined was incised deep to adipose tissue with a #15 scalpel blade.  The skin margins were undermined to an appropriate distance in all directions utilizing iris scissors.
O-T Advancement Flap Text: The defect edges were debeveled with a #15 scalpel blade.  Given the location of the defect, shape of the defect and the proximity to free margins an O-T advancement flap was deemed most appropriate.  Using a sterile surgical marker, an appropriate advancement flap was drawn incorporating the defect and placing the expected incisions within the relaxed skin tension lines where possible.    The area thus outlined was incised deep to adipose tissue with a #15 scalpel blade.  The skin margins were undermined to an appropriate distance in all directions utilizing iris scissors.
O-L Flap Text: The defect edges were debeveled with a #15 scalpel blade.  Given the location of the defect, shape of the defect and the proximity to free margins an O-L flap was deemed most appropriate.  Using a sterile surgical marker, an appropriate advancement flap was drawn incorporating the defect and placing the expected incisions within the relaxed skin tension lines where possible.    The area thus outlined was incised deep to adipose tissue with a #15 scalpel blade.  The skin margins were undermined to an appropriate distance in all directions utilizing iris scissors.
O-Z Flap Text: The defect edges were debeveled with a #15 scalpel blade.  Given the location of the defect, shape of the defect and the proximity to free margins an O-Z flap was deemed most appropriate.  Using a sterile surgical marker, an appropriate transposition flap was drawn incorporating the defect and placing the expected incisions within the relaxed skin tension lines where possible. The area thus outlined was incised deep to adipose tissue with a #15 scalpel blade.  The skin margins were undermined to an appropriate distance in all directions utilizing iris scissors.
Double O-Z Flap Text: The defect edges were debeveled with a #15 scalpel blade.  Given the location of the defect, shape of the defect and the proximity to free margins a Double O-Z flap was deemed most appropriate.  Using a sterile surgical marker, an appropriate transposition flap was drawn incorporating the defect and placing the expected incisions within the relaxed skin tension lines where possible. The area thus outlined was incised deep to adipose tissue with a #15 scalpel blade.  The skin margins were undermined to an appropriate distance in all directions utilizing iris scissors.
V-Y Flap Text: The defect edges were debeveled with a #15 scalpel blade.  Given the location of the defect, shape of the defect and the proximity to free margins a V-Y flap was deemed most appropriate.  Using a sterile surgical marker, an appropriate advancement flap was drawn incorporating the defect and placing the expected incisions within the relaxed skin tension lines where possible.    The area thus outlined was incised deep to adipose tissue with a #15 scalpel blade.  The skin margins were undermined to an appropriate distance in all directions utilizing iris scissors.
Advancement-Rotation Flap Text: The defect edges were debeveled with a #15 scalpel blade.  Given the location of the defect, shape of the defect and the proximity to free margins an advancement-rotation flap was deemed most appropriate.  Using a sterile surgical marker, an appropriate flap was drawn incorporating the defect and placing the expected incisions within the relaxed skin tension lines where possible. The area thus outlined was incised deep to adipose tissue with a #15 scalpel blade.  The skin margins were undermined to an appropriate distance in all directions utilizing iris scissors.
Mercedes Flap Text: The defect edges were debeveled with a #15 scalpel blade.  Given the location of the defect, shape of the defect and the proximity to free margins a Mercedes flap was deemed most appropriate.  Using a sterile surgical marker, an appropriate advancement flap was drawn incorporating the defect and placing the expected incisions within the relaxed skin tension lines where possible. The area thus outlined was incised deep to adipose tissue with a #15 scalpel blade.  The skin margins were undermined to an appropriate distance in all directions utilizing iris scissors.
Modified Advancement Flap Text: The defect edges were debeveled with a #15 scalpel blade.  Given the location of the defect, shape of the defect and the proximity to free margins a modified advancement flap was deemed most appropriate.  Using a sterile surgical marker, an appropriate advancement flap was drawn incorporating the defect and placing the expected incisions within the relaxed skin tension lines where possible.    The area thus outlined was incised deep to adipose tissue with a #15 scalpel blade.  The skin margins were undermined to an appropriate distance in all directions utilizing iris scissors.
Mucosal Advancement Flap Text: Given the location of the defect, shape of the defect and the proximity to free margins a mucosal advancement flap was deemed most appropriate. Incisions were made with a 15 blade scalpel in the appropriate fashion along the cutaneous vermilion border and the mucosal lip. The remaining actinically damaged mucosal tissue was excised.  The mucosal advancement flap was then elevated to the gingival sulcus with care taken to preserve the neurovascular structures and advanced into the primary defect. Care was taken to ensure that precise realignment of the vermilion border was achieved.
Peng Advancement Flap Text: The defect edges were debeveled with a #15 scalpel blade.  Given the location of the defect, shape of the defect and the proximity to free margins a Peng advancement flap was deemed most appropriate.  Using a sterile surgical marker, an appropriate advancement flap was drawn incorporating the defect and placing the expected incisions within the relaxed skin tension lines where possible. The area thus outlined was incised deep to adipose tissue with a #15 scalpel blade.  The skin margins were undermined to an appropriate distance in all directions utilizing iris scissors.
Hatchet Flap Text: The defect edges were debeveled with a #15 scalpel blade.  Given the location of the defect, shape of the defect and the proximity to free margins a hatchet flap based from the glabella was deemed most appropriate.  Using a sterile surgical marker, an appropriate glabellar hatchet flap was drawn incorporating the defect and placing the expected incisions within the relaxed skin tension lines where possible.    The area thus outlined was incised deep to adipose tissue with a #15 scalpel blade.  The skin margins were undermined to an appropriate distance in all directions utilizing iris scissors.
Rotation Flap Text: The defect edges were debeveled with a #15 scalpel blade.  Given the location of the defect, shape of the defect and the proximity to free margins a rotation flap was deemed most appropriate.  Using a sterile surgical marker, an appropriate rotation flap was drawn incorporating the defect and placing the expected incisions within the relaxed skin tension lines where possible.    The area thus outlined was incised deep to adipose tissue with a #15 scalpel blade.  The skin margins were undermined to an appropriate distance in all directions utilizing iris scissors.
Spiral Flap Text: The defect edges were debeveled with a #15 scalpel blade.  Given the location of the defect, shape of the defect and the proximity to free margins a spiral flap was deemed most appropriate.  Using a sterile surgical marker, an appropriate rotation flap was drawn incorporating the defect and placing the expected incisions within the relaxed skin tension lines where possible. The area thus outlined was incised deep to adipose tissue with a #15 scalpel blade.  The skin margins were undermined to an appropriate distance in all directions utilizing iris scissors.
Staged Advancement Flap Text: The defect edges were debeveled with a #15 scalpel blade.  Given the location of the defect, shape of the defect and the proximity to free margins a staged advancement flap was deemed most appropriate.  Using a sterile surgical marker, an appropriate advancement flap was drawn incorporating the defect and placing the expected incisions within the relaxed skin tension lines where possible. The area thus outlined was incised deep to adipose tissue with a #15 scalpel blade.  The skin margins were undermined to an appropriate distance in all directions utilizing iris scissors.
Star Wedge Flap Text: The defect edges were debeveled with a #15 scalpel blade.  Given the location of the defect, shape of the defect and the proximity to free margins a star wedge flap was deemed most appropriate.  Using a sterile surgical marker, an appropriate rotation flap was drawn incorporating the defect and placing the expected incisions within the relaxed skin tension lines where possible. The area thus outlined was incised deep to adipose tissue with a #15 scalpel blade.  The skin margins were undermined to an appropriate distance in all directions utilizing iris scissors.
Transposition Flap Text: The defect edges were debeveled with a #15 scalpel blade.  Given the location of the defect and the proximity to free margins a transposition flap was deemed most appropriate.  Using a sterile surgical marker, an appropriate transposition flap was drawn incorporating the defect.    The area thus outlined was incised deep to adipose tissue with a #15 scalpel blade.  The skin margins were undermined to an appropriate distance in all directions utilizing iris scissors.
Muscle Hinge Flap Text: The defect edges were debeveled with a #15 scalpel blade.  Given the size, depth and location of the defect and the proximity to free margins a muscle hinge flap was deemed most appropriate.  Using a sterile surgical marker, an appropriate hinge flap was drawn incorporating the defect. The area thus outlined was incised with a #15 scalpel blade.  The skin margins were undermined to an appropriate distance in all directions utilizing iris scissors.
Mustarde Flap Text: The defect edges were debeveled with a #15 scalpel blade.  Given the size, depth and location of the defect and the proximity to free margins a Mustarde flap was deemed most appropriate.  Using a sterile surgical marker, an appropriate flap was drawn incorporating the defect. The area thus outlined was incised with a #15 scalpel blade.  The skin margins were undermined to an appropriate distance in all directions utilizing iris scissors.
Nasal Turnover Hinge Flap Text: The defect edges were debeveled with a #15 scalpel blade.  Given the size, depth, location of the defect and the defect being full thickness a nasal turnover hinge flap was deemed most appropriate.  Using a sterile surgical marker, an appropriate hinge flap was drawn incorporating the defect. The area thus outlined was incised with a #15 scalpel blade. The flap was designed to recreate the nasal mucosal lining and the alar rim. The skin margins were undermined to an appropriate distance in all directions utilizing iris scissors.
Nasalis-Muscle-Based Myocutaneous Island Pedicle Flap Text: Using a #15 blade, an incision was made around the donor flap to the level of the nasalis muscle. Wide lateral undermining was then performed in both the subcutaneous plane above the nasalis muscle, and in a submuscular plane just above periosteum. This allowed the formation of a free nasalis muscle axial pedicle (based on the angular artery) which was still attached to the actual cutaneous flap, increasing its mobility and vascular viability. Hemostasis was obtained with pinpoint electrocoagulation. The flap was mobilized into position and the pivotal anchor points positioned and stabilized with buried interrupted sutures. Subcutaneous and dermal tissues were closed in a multilayered fashion with sutures. Tissue redundancies were excised, and the epidermal edges were apposed without significant tension and sutured with sutures.
Orbicularis Oris Muscle Flap Text: The defect edges were debeveled with a #15 scalpel blade.  Given that the defect affected the competency of the oral sphincter an orbicularis oris muscle flap was deemed most appropriate to restore this competency and normal muscle function.  Using a sterile surgical marker, an appropriate flap was drawn incorporating the defect. The area thus outlined was incised with a #15 scalpel blade.
Melolabial Transposition Flap Text: The defect edges were debeveled with a #15 scalpel blade.  Given the location of the defect and the proximity to free margins a melolabial flap was deemed most appropriate.  Using a sterile surgical marker, an appropriate melolabial transposition flap was drawn incorporating the defect.    The area thus outlined was incised deep to adipose tissue with a #15 scalpel blade.  The skin margins were undermined to an appropriate distance in all directions utilizing iris scissors.
Rhombic Flap Text: The defect edges were debeveled with a #15 scalpel blade.  Given the location of the defect and the proximity to free margins a rhombic flap was deemed most appropriate.  Using a sterile surgical marker, an appropriate rhombic flap was drawn incorporating the defect.    The area thus outlined was incised deep to adipose tissue with a #15 scalpel blade.  The skin margins were undermined to an appropriate distance in all directions utilizing iris scissors.
Rhomboid Transposition Flap Text: The defect edges were debeveled with a #15 scalpel blade.  Given the location of the defect and the proximity to free margins a rhomboid transposition flap was deemed most appropriate.  Using a sterile surgical marker, an appropriate rhomboid flap was drawn incorporating the defect.    The area thus outlined was incised deep to adipose tissue with a #15 scalpel blade.  The skin margins were undermined to an appropriate distance in all directions utilizing iris scissors.
Bi-Rhombic Flap Text: The defect edges were debeveled with a #15 scalpel blade.  Given the location of the defect and the proximity to free margins a bi-rhombic flap was deemed most appropriate.  Using a sterile surgical marker, an appropriate rhombic flap was drawn incorporating the defect. The area thus outlined was incised deep to adipose tissue with a #15 scalpel blade.  The skin margins were undermined to an appropriate distance in all directions utilizing iris scissors.
Helical Rim Advancement Flap Text: The defect edges were debeveled with a #15 blade scalpel.  Given the location of the defect and the proximity to free margins (helical rim) a double helical rim advancement flap was deemed most appropriate.  Using a sterile surgical marker, the appropriate advancement flaps were drawn incorporating the defect and placing the expected incisions between the helical rim and antihelix where possible.  The area thus outlined was incised through and through with a #15 scalpel blade.  With a skin hook and iris scissors, the flaps were gently and sharply undermined and freed up.
Bilateral Helical Rim Advancement Flap Text: The defect edges were debeveled with a #15 blade scalpel.  Given the location of the defect and the proximity to free margins (helical rim) a bilateral helical rim advancement flap was deemed most appropriate.  Using a sterile surgical marker, the appropriate advancement flaps were drawn incorporating the defect and placing the expected incisions between the helical rim and antihelix where possible.  The area thus outlined was incised through and through with a #15 scalpel blade.  With a skin hook and iris scissors, the flaps were gently and sharply undermined and freed up.
Ear Star Wedge Flap Text: The defect edges were debeveled with a #15 blade scalpel.  Given the location of the defect and the proximity to free margins (helical rim) an ear star wedge flap was deemed most appropriate.  Using a sterile surgical marker, the appropriate flap was drawn incorporating the defect and placing the expected incisions between the helical rim and antihelix where possible.  The area thus outlined was incised through and through with a #15 scalpel blade.
Banner Transposition Flap Text: The defect edges were debeveled with a #15 scalpel blade.  Given the location of the defect and the proximity to free margins a Banner transposition flap was deemed most appropriate.  Using a sterile surgical marker, an appropriate flap drawn around the defect. The area thus outlined was incised deep to adipose tissue with a #15 scalpel blade.  The skin margins were undermined to an appropriate distance in all directions utilizing iris scissors.
Bilobed Flap Text: The defect edges were debeveled with a #15 scalpel blade.  Given the location of the defect and the proximity to free margins a bilobe flap was deemed most appropriate.  Using a sterile surgical marker, an appropriate bilobe flap drawn around the defect.    The area thus outlined was incised deep to adipose tissue with a #15 scalpel blade.  The skin margins were undermined to an appropriate distance in all directions utilizing iris scissors.
Bilobed Transposition Flap Text: The defect edges were debeveled with a #15 scalpel blade.  Given the location of the defect and the proximity to free margins a bilobed transposition flap was deemed most appropriate.  Using a sterile surgical marker, an appropriate bilobe flap drawn around the defect.    The area thus outlined was incised deep to adipose tissue with a #15 scalpel blade.  The skin margins were undermined to an appropriate distance in all directions utilizing iris scissors.
Trilobed Flap Text: The defect edges were debeveled with a #15 scalpel blade.  Given the location of the defect and the proximity to free margins a trilobed flap was deemed most appropriate.  Using a sterile surgical marker, an appropriate trilobed flap drawn around the defect.    The area thus outlined was incised deep to adipose tissue with a #15 scalpel blade.  The skin margins were undermined to an appropriate distance in all directions utilizing iris scissors.
Dorsal Nasal Flap Text: The defect edges were debeveled with a #15 scalpel blade.  Given the location of the defect and the proximity to free margins a dorsal nasal flap,based upon the glabellar folds, was deemed most appropriate.  Using a sterile surgical marker, an appropriate dorsal nasal flap was drawn around the defect.    The area thus outlined was incised deep to adipose tissue with a #15 scalpel blade.  The skin margins were undermined to an appropriate distance in all directions utilizing iris scissors.
Island Pedicle Flap Text: The defect edges were debeveled with a #15 scalpel blade.  Given the location of the defect, shape of the defect and the proximity to free margins an island pedicle advancement flap was deemed most appropriate.  Using a sterile surgical marker, an appropriate advancement flap was drawn incorporating the defect, outlining the appropriate donor tissue and placing the expected incisions within the relaxed skin tension lines where possible.    The area thus outlined was incised deep to adipose tissue with a #15 scalpel blade.  The skin margins were undermined to an appropriate distance in all directions around the primary defect and laterally outward around the island pedicle utilizing iris scissors.  There was minimal undermining beneath the pedicle flap.
Island Pedicle Flap With Canthal Suspension Text: The defect edges were debeveled with a #15 scalpel blade.  Given the location of the defect, shape of the defect and the proximity to free margins an island pedicle advancement flap was deemed most appropriate.  Using a sterile surgical marker, an appropriate advancement flap was drawn incorporating the defect, outlining the appropriate donor tissue and placing the expected incisions within the relaxed skin tension lines where possible. The area thus outlined was incised deep to adipose tissue with a #15 scalpel blade.  The skin margins were undermined to an appropriate distance in all directions around the primary defect and laterally outward around the island pedicle utilizing iris scissors.  There was minimal undermining beneath the pedicle flap. A suspension suture was placed in the canthal tendon to prevent tension and prevent ectropion.
Alar Island Pedicle Flap Text: The defect edges were debeveled with a #15 scalpel blade.  Given the location of the defect, shape of the defect and the proximity to the alar rim an island pedicle advancement flap was deemed most appropriate.  Using a sterile surgical marker, an appropriate advancement flap was drawn incorporating the defect, outlining the appropriate donor tissue and placing the expected incisions within the nasal ala running parallel to the alar rim. The area thus outlined was incised with a #15 scalpel blade.  The skin margins were undermined minimally to an appropriate distance in all directions around the primary defect and laterally outward around the island pedicle utilizing iris scissors.  There was minimal undermining beneath the pedicle flap.
Double Island Pedicle Flap Text: The defect edges were debeveled with a #15 scalpel blade.  Given the location of the defect, shape of the defect and the proximity to free margins a double island pedicle advancement flap was deemed most appropriate.  Using a sterile surgical marker, an appropriate advancement flap was drawn incorporating the defect, outlining the appropriate donor tissue and placing the expected incisions within the relaxed skin tension lines where possible.    The area thus outlined was incised deep to adipose tissue with a #15 scalpel blade.  The skin margins were undermined to an appropriate distance in all directions around the primary defect and laterally outward around the island pedicle utilizing iris scissors.  There was minimal undermining beneath the pedicle flap.
Island Pedicle Flap-Requiring Vessel Identification Text: The defect edges were debeveled with a #15 scalpel blade.  Given the location of the defect, shape of the defect and the proximity to free margins an island pedicle advancement flap was deemed most appropriate.  Using a sterile surgical marker, an appropriate advancement flap was drawn, based on the axial vessel mentioned above, incorporating the defect, outlining the appropriate donor tissue and placing the expected incisions within the relaxed skin tension lines where possible.    The area thus outlined was incised deep to adipose tissue with a #15 scalpel blade.  The skin margins were undermined to an appropriate distance in all directions around the primary defect and laterally outward around the island pedicle utilizing iris scissors.  There was minimal undermining beneath the pedicle flap.
Keystone Flap Text: The defect edges were debeveled with a #15 scalpel blade.  Given the location of the defect, shape of the defect a keystone flap was deemed most appropriate.  Using a sterile surgical marker, an appropriate keystone flap was drawn incorporating the defect, outlining the appropriate donor tissue and placing the expected incisions within the relaxed skin tension lines where possible. The area thus outlined was incised deep to adipose tissue with a #15 scalpel blade.  The skin margins were undermined to an appropriate distance in all directions around the primary defect and laterally outward around the flap utilizing iris scissors.
O-T Plasty Text: The defect edges were debeveled with a #15 scalpel blade.  Given the location of the defect, shape of the defect and the proximity to free margins an O-T plasty was deemed most appropriate.  Using a sterile surgical marker, an appropriate O-T plasty was drawn incorporating the defect and placing the expected incisions within the relaxed skin tension lines where possible.    The area thus outlined was incised deep to adipose tissue with a #15 scalpel blade.  The skin margins were undermined to an appropriate distance in all directions utilizing iris scissors.
O-Z Plasty Text: The defect edges were debeveled with a #15 scalpel blade.  Given the location of the defect, shape of the defect and the proximity to free margins an O-Z plasty (double transposition flap) was deemed most appropriate.  Using a sterile surgical marker, the appropriate transposition flaps were drawn incorporating the defect and placing the expected incisions within the relaxed skin tension lines where possible.    The area thus outlined was incised deep to adipose tissue with a #15 scalpel blade.  The skin margins were undermined to an appropriate distance in all directions utilizing iris scissors.  Hemostasis was achieved with electrocautery.  The flaps were then transposed into place, one clockwise and the other counterclockwise, and anchored with interrupted buried subcutaneous sutures.
Double O-Z Plasty Text: The defect edges were debeveled with a #15 scalpel blade.  Given the location of the defect, shape of the defect and the proximity to free margins a Double O-Z plasty (double transposition flap) was deemed most appropriate.  Using a sterile surgical marker, the appropriate transposition flaps were drawn incorporating the defect and placing the expected incisions within the relaxed skin tension lines where possible. The area thus outlined was incised deep to adipose tissue with a #15 scalpel blade.  The skin margins were undermined to an appropriate distance in all directions utilizing iris scissors.  Hemostasis was achieved with electrocautery.  The flaps were then transposed into place, one clockwise and the other counterclockwise, and anchored with interrupted buried subcutaneous sutures.
V-Y Plasty Text: The defect edges were debeveled with a #15 scalpel blade.  Given the location of the defect, shape of the defect and the proximity to free margins an V-Y advancement flap was deemed most appropriate.  Using a sterile surgical marker, an appropriate advancement flap was drawn incorporating the defect and placing the expected incisions within the relaxed skin tension lines where possible.    The area thus outlined was incised deep to adipose tissue with a #15 scalpel blade.  The skin margins were undermined to an appropriate distance in all directions utilizing iris scissors.
H Plasty Text: Given the location of the defect, shape of the defect and the proximity to free margins a H-plasty was deemed most appropriate for repair.  Using a sterile surgical marker, the appropriate advancement arms of the H-plasty were drawn incorporating the defect and placing the expected incisions within the relaxed skin tension lines where possible. The area thus outlined was incised deep to adipose tissue with a #15 scalpel blade. The skin margins were undermined to an appropriate distance in all directions utilizing iris scissors.  The opposing advancement arms were then advanced into place in opposite direction and anchored with interrupted buried subcutaneous sutures.
W Plasty Text: The lesion was extirpated to the level of the fat with a #15 scalpel blade.  Given the location of the defect, shape of the defect and the proximity to free margins a W-plasty was deemed most appropriate for repair.  Using a sterile surgical marker, the appropriate transposition arms of the W-plasty were drawn incorporating the defect and placing the expected incisions within the relaxed skin tension lines where possible.    The area thus outlined was incised deep to adipose tissue with a #15 scalpel blade.  The skin margins were undermined to an appropriate distance in all directions utilizing iris scissors.  The opposing transposition arms were then transposed into place in opposite direction and anchored with interrupted buried subcutaneous sutures.
Z Plasty Text: The lesion was extirpated to the level of the fat with a #15 scalpel blade.  Given the location of the defect, shape of the defect and the proximity to free margins a Z-plasty was deemed most appropriate for repair.  Using a sterile surgical marker, the appropriate transposition arms of the Z-plasty were drawn incorporating the defect and placing the expected incisions within the relaxed skin tension lines where possible.    The area thus outlined was incised deep to adipose tissue with a #15 scalpel blade.  The skin margins were undermined to an appropriate distance in all directions utilizing iris scissors.  The opposing transposition arms were then transposed into place in opposite direction and anchored with interrupted buried subcutaneous sutures.
Zygomaticofacial Flap Text: Given the location of the defect, shape of the defect and the proximity to free margins a zygomaticofacial flap was deemed most appropriate for repair.  Using a sterile surgical marker, the appropriate flap was drawn incorporating the defect and placing the expected incisions within the relaxed skin tension lines where possible. The area thus outlined was incised deep to adipose tissue with a #15 scalpel blade with preservation of a vascular pedicle.  The skin margins were undermined to an appropriate distance in all directions utilizing iris scissors.  The flap was then placed into the defect and anchored with interrupted buried subcutaneous sutures.
Cheek Interpolation Flap Text: A decision was made to reconstruct the defect utilizing an interpolation axial flap and a staged reconstruction.  A telfa template was made of the defect.  This telfa template was then used to outline the Cheek Interpolation flap.  The donor area for the pedicle flap was then injected with anesthesia.  The flap was excised through the skin and subcutaneous tissue down to the layer of the underlying musculature.  The interpolation flap was carefully excised within this deep plane to maintain its blood supply.  The edges of the donor site were undermined.   The donor site was closed in a primary fashion.  The pedicle was then rotated into position and sutured.  Once the tube was sutured into place, adequate blood supply was confirmed with blanching and refill.  The pedicle was then wrapped with xeroform gauze and dressed appropriately with a telfa and gauze bandage to ensure continued blood supply and protect the attached pedicle.
Cheek-To-Nose Interpolation Flap Text: A decision was made to reconstruct the defect utilizing an interpolation axial flap and a staged reconstruction.  A telfa template was made of the defect.  This telfa template was then used to outline the Cheek-To-Nose Interpolation flap.  The donor area for the pedicle flap was then injected with anesthesia.  The flap was excised through the skin and subcutaneous tissue down to the layer of the underlying musculature.  The interpolation flap was carefully excised within this deep plane to maintain its blood supply.  The edges of the donor site were undermined.   The donor site was closed in a primary fashion.  The pedicle was then rotated into position and sutured.  Once the tube was sutured into place, adequate blood supply was confirmed with blanching and refill.  The pedicle was then wrapped with xeroform gauze and dressed appropriately with a telfa and gauze bandage to ensure continued blood supply and protect the attached pedicle.
Interpolation Flap Text: A decision was made to reconstruct the defect utilizing an interpolation axial flap and a staged reconstruction.  A telfa template was made of the defect.  This telfa template was then used to outline the interpolation flap.  The donor area for the pedicle flap was then injected with anesthesia.  The flap was excised through the skin and subcutaneous tissue down to the layer of the underlying musculature.  The interpolation flap was carefully excised within this deep plane to maintain its blood supply.  The edges of the donor site were undermined.   The donor site was closed in a primary fashion.  The pedicle was then rotated into position and sutured.  Once the tube was sutured into place, adequate blood supply was confirmed with blanching and refill.  The pedicle was then wrapped with xeroform gauze and dressed appropriately with a telfa and gauze bandage to ensure continued blood supply and protect the attached pedicle.
Melolabial Interpolation Flap Text: A decision was made to reconstruct the defect utilizing an interpolation axial flap and a staged reconstruction.  A telfa template was made of the defect.  This telfa template was then used to outline the melolabial interpolation flap.  The donor area for the pedicle flap was then injected with anesthesia.  The flap was excised through the skin and subcutaneous tissue down to the layer of the underlying musculature.  The pedicle flap was carefully excised within this deep plane to maintain its blood supply.  The edges of the donor site were undermined.   The donor site was closed in a primary fashion.  The pedicle was then rotated into position and sutured.  Once the tube was sutured into place, adequate blood supply was confirmed with blanching and refill.  The pedicle was then wrapped with xeroform gauze and dressed appropriately with a telfa and gauze bandage to ensure continued blood supply and protect the attached pedicle.
Mastoid Interpolation Flap Text: A decision was made to reconstruct the defect utilizing an interpolation axial flap and a staged reconstruction.  A telfa template was made of the defect.  This telfa template was then used to outline the mastoid interpolation flap.  The donor area for the pedicle flap was then injected with anesthesia.  The flap was excised through the skin and subcutaneous tissue down to the layer of the underlying musculature.  The pedicle flap was carefully excised within this deep plane to maintain its blood supply.  The edges of the donor site were undermined.   The donor site was closed in a primary fashion.  The pedicle was then rotated into position and sutured.  Once the tube was sutured into place, adequate blood supply was confirmed with blanching and refill.  The pedicle was then wrapped with xeroform gauze and dressed appropriately with a telfa and gauze bandage to ensure continued blood supply and protect the attached pedicle.
Posterior Auricular Interpolation Flap Text: A decision was made to reconstruct the defect utilizing an interpolation axial flap and a staged reconstruction.  A telfa template was made of the defect.  This telfa template was then used to outline the posterior auricular interpolation flap.  The donor area for the pedicle flap was then injected with anesthesia.  The flap was excised through the skin and subcutaneous tissue down to the layer of the underlying musculature.  The pedicle flap was carefully excised within this deep plane to maintain its blood supply.  The edges of the donor site were undermined.   The donor site was closed in a primary fashion.  The pedicle was then rotated into position and sutured.  Once the tube was sutured into place, adequate blood supply was confirmed with blanching and refill.  The pedicle was then wrapped with xeroform gauze and dressed appropriately with a telfa and gauze bandage to ensure continued blood supply and protect the attached pedicle.
Paramedian Forehead Flap Text: A decision was made to reconstruct the defect utilizing an interpolation axial flap and a staged reconstruction.  A telfa template was made of the defect.  This telfa template was then used to outline the paramedian forehead pedicle flap.  The donor area for the pedicle flap was then injected with anesthesia.  The flap was excised through the skin and subcutaneous tissue down to the layer of the underlying musculature.  The pedicle flap was carefully excised within this deep plane to maintain its blood supply.  The edges of the donor site were undermined.   The donor site was closed in a primary fashion.  The pedicle was then rotated into position and sutured.  Once the tube was sutured into place, adequate blood supply was confirmed with blanching and refill.  The pedicle was then wrapped with xeroform gauze and dressed appropriately with a telfa and gauze bandage to ensure continued blood supply and protect the attached pedicle.
Cheiloplasty (Less Than 50%) Text: A decision was made to reconstruct the defect with a  cheiloplasty.  The defect was undermined extensively.  Additional obicularis oris muscle was excised with a 15 blade scalpel.  The defect was converted into a full thickness wedge, of less than 50% of the vertical height of the lip, to facilite a better cosmetic result.  Small vessels were then tied off with 5-0 monocyrl. The obicularis oris, superficial fascia, adipose and dermis were then reapproximated.  After the deeper layers were approximated the epidermis was reapproximated with particular care given to realign the vermilion border.
Cheiloplasty (Complex) Text: A decision was made to reconstruct the defect with a  cheiloplasty.  The defect was undermined extensively.  Additional obicularis oris muscle was excised with a 15 blade scalpel.  The defect was converted into a full thickness wedge to facilite a better cosmetic result.  Small vessels were then tied off with 5-0 monocyrl. The obicularis oris, superficial fascia, adipose and dermis were then reapproximated.  After the deeper layers were approximated the epidermis was reapproximated with particular care given to realign the vermilion border.
Ear Wedge Repair Text: A wedge excision was completed by carrying down an excision through the full thickness of the ear and cartilage with an inward facing Burow's triangle. The wound was then closed in a layered fashion.
Full Thickness Lip Wedge Repair (Flap) Text: Given the location of the defect and the proximity to free margins a full thickness wedge repair was deemed most appropriate.  Using a sterile surgical marker, the appropriate repair was drawn incorporating the defect and placing the expected incisions perpendicular to the vermilion border.  The vermilion border was also meticulously outlined to ensure appropriate reapproximation during the repair.  The area thus outlined was incised through and through with a #15 scalpel blade.  The muscularis and dermis were reaproximated with deep sutures following hemostasis. Care was taken to realign the vermilion border before proceeding with the superficial closure.  Once the vermilion was realigned the superfical and mucosal closure was finished.
Ftsg Text: The defect edges were debeveled with a #15 scalpel blade.  Given the location of the defect, shape of the defect and the proximity to free margins a full thickness skin graft was deemed most appropriate.  Using a sterile surgical marker, the primary defect shape was transferred to the donor site. The area thus outlined was incised deep to adipose tissue with a #15 scalpel blade.  The harvested graft was then trimmed of adipose tissue until only dermis and epidermis was left.  The skin margins of the secondary defect were undermined to an appropriate distance in all directions utilizing iris scissors.  The secondary defect was closed with interrupted buried subcutaneous sutures.  The skin edges were then re-apposed with running  sutures.  The skin graft was then placed in the primary defect and oriented appropriately.
Split-Thickness Skin Graft Text: The defect edges were debeveled with a #15 scalpel blade.  Given the location of the defect, shape of the defect and the proximity to free margins a split thickness skin graft was deemed most appropriate.  Using a sterile surgical marker, the primary defect shape was transferred to the donor site. The split thickness graft was then harvested.  The skin graft was then placed in the primary defect and oriented appropriately.
Burow's Graft Text: The defect edges were debeveled with a #15 scalpel blade.  Given the location of the defect, shape of the defect, the proximity to free margins and the presence of a standing cone deformity a Burow's skin graft was deemed most appropriate. The standing cone was removed and this tissue was then trimmed to the shape of the primary defect. The adipose tissue was also removed until only dermis and epidermis were left.  The skin margins of the secondary defect were undermined to an appropriate distance in all directions utilizing iris scissors.  The secondary defect was closed with interrupted buried subcutaneous sutures.  The skin edges were then re-apposed with running  sutures.  The skin graft was then placed in the primary defect and oriented appropriately.
Cartilage Graft Text: The defect edges were debeveled with a #15 scalpel blade.  Given the location of the defect, shape of the defect, the fact the defect involved a full thickness cartilage defect a cartilage graft was deemed most appropriate.  An appropriate donor site was identified, cleansed, and anesthetized. The cartilage graft was then harvested and transferred to the recipient site, oriented appropriately and then sutured into place.  The secondary defect was then repaired using a primary closure.
Composite Graft Text: The defect edges were debeveled with a #15 scalpel blade.  Given the location of the defect, shape of the defect, the proximity to free margins and the fact the defect was full thickness a composite graft was deemed most appropriate.  The defect was outline and then transferred to the donor site.  A full thickness graft was then excised from the donor site. The graft was then placed in the primary defect, oriented appropriately and then sutured into place.  The secondary defect was then repaired using a primary closure.
Epidermal Autograft Text: The defect edges were debeveled with a #15 scalpel blade.  Given the location of the defect, shape of the defect and the proximity to free margins an epidermal autograft was deemed most appropriate.  Using a sterile surgical marker, the primary defect shape was transferred to the donor site. The epidermal graft was then harvested.  The skin graft was then placed in the primary defect and oriented appropriately.
Dermal Autograft Text: The defect edges were debeveled with a #15 scalpel blade.  Given the location of the defect, shape of the defect and the proximity to free margins a dermal autograft was deemed most appropriate.  Using a sterile surgical marker, the primary defect shape was transferred to the donor site. The area thus outlined was incised deep to adipose tissue with a #15 scalpel blade.  The harvested graft was then trimmed of adipose and epidermal tissue until only dermis was left.  The skin graft was then placed in the primary defect and oriented appropriately.
Skin Substitute Text: The defect edges were debeveled with a #15 scalpel blade.  Given the location of the defect, shape of the defect and the proximity to free margins a skin substitute graft was deemed most appropriate.  The graft material was trimmed to fit the size of the defect. The graft was then placed in the primary defect and oriented appropriately.
Tissue Cultured Epidermal Autograft Text: The defect edges were debeveled with a #15 scalpel blade.  Given the location of the defect, shape of the defect and the proximity to free margins a tissue cultured epidermal autograft was deemed most appropriate.  The graft was then trimmed to fit the size of the defect.  The graft was then placed in the primary defect and oriented appropriately.
Xenograft Text: The defect edges were debeveled with a #15 scalpel blade.  Given the location of the defect, shape of the defect and the proximity to free margins a xenograft was deemed most appropriate.  The graft was then trimmed to fit the size of the defect.  The graft was then placed in the primary defect and oriented appropriately.
Purse String (Simple) Text: Given the location of the defect and the characteristics of the surrounding skin a purse string closure was deemed most appropriate.  Undermining was performed circumfirentially around the surgical defect.  A purse string suture was then placed and tightened.
Purse String (Intermediate) Text: Given the location of the defect and the characteristics of the surrounding skin a purse string intermediate closure was deemed most appropriate.  Undermining was performed circumfirentially around the surgical defect.  A purse string suture was then placed and tightened.
Partial Purse String (Simple) Text: Given the location of the defect and the characteristics of the surrounding skin a simple purse string closure was deemed most appropriate.  Undermining was performed circumfirentially around the surgical defect.  A purse string suture was then placed and tightened. Wound tension only allowed a partial closure of the circular defect.
Partial Purse String (Intermediate) Text: Given the location of the defect and the characteristics of the surrounding skin an intermediate purse string closure was deemed most appropriate.  Undermining was performed circumfirentially around the surgical defect.  A purse string suture was then placed and tightened. Wound tension only allowed a partial closure of the circular defect.
Localized Dermabrasion With Wire Brush Text: The patient was draped in routine manner.  Localized dermabrasion using 3 x 17 mm wire brush was performed in routine manner to papillary dermis. This spot dermabrasion is being performed to complete skin cancer reconstruction. It also will eliminate the other sun damaged precancerous cells that are known to be part of the regional effect of a lifetime's worth of sun exposure. This localized dermabrasion is therapeutic and should not be considered cosmetic in any regard.
Tarsorrhaphy Text: A tarsorrhaphy was performed using Frost sutures.
Complex Repair And Flap Additional Text (Will Appearing After The Standard Complex Repair Text): The complex repair was not sufficient to completely close the primary defect. The remaining additional defect was repaired with the flap mentioned below.
Complex Repair And Graft Additional Text (Will Appearing After The Standard Complex Repair Text): The complex repair was not sufficient to completely close the primary defect. The remaining additional defect was repaired with the graft mentioned below.
Unique Flap 1 Name: Myocutaneous Island pedicle Flap
Unique Flap 2 Name: Peng Flap
Unique Flap 3 Name: Mercedes Flap
Unique Flap 4 Name: Banner Flap
Unique Flap 5 Name: tunneled myocutaneous flap
Unique Flap 6 Name: Danuta-B?ch flap
Unique Flap 7 Name: Mustarde flap
Unique Flap 8 Name: East to West Flap
Unique Flap 1 Text: A decision was made to reconstruct the defect utilizing a myocutaneous Island pedicle Flap based on the levator labii superioris muscle.  A telfa template was made of the defect.  This telfa template was then used to outline the myocutaneous flap, based along the meilolabial fold.  The donor area for the pedicle flap was then injected with anesthesia.  The flap was excised through the skin and subcutaneous tissue down to the layer of the underlying musculature.  The myocutaneous flap was carefully excised within this deep plane to maintain its blood supply. Based on the muscle. The edges of the donor site were undermined.   The donor site was closed in a primary fashion to the point of transposition.  The pedicle was then transposed into position and sutured.  Once the flap was sutured into place, adequate blood supply was confirmed with blanching and refill.
Unique Flap 2 Text: A decision was made to reconstruct the defect utilizing a Peng Flap (Bilateral Advancement Rotation Flap). Given the location of the defect and the proximity to free margins, this flap was deemed most appropriate.  Using a sterile surgical marker, the appropriate rotation flaps were drawn incorporating the defect and placing the expected incisions within the relaxed skin tension lines where possible.    The area thus outlined was incised deep to adipose tissue with a #15 scalpel blade.  The skin margins were undermined to an appropriate distance in all directions utilizing iris scissors.
Unique Flap 3 Text: The defect edges were debeveled with a #15 scalpel blade.  Given the location of the defect, shape of the defect and the proximity to free margins a Mercedes (double advancement flap) was deemed most appropriate.  Using a sterile surgical marker, the appropriate transposition flaps were drawn incorporating the defect and placing the expected incisions within the relaxed skin tension lines where possible.    The area thus outlined was incised deep to adipose tissue with a #15 scalpel blade.  The skin margins were undermined to an appropriate distance in all directions utilizing iris scissors.  Hemostasis was achieved with electrocautery.  The flaps were then advanced into the defect and anchored with interrupted buried subcutaneous sutures.
Unique Flap 4 Text: The defect edges were debeveled with a #15 scalpel blade.  Given the location of the defect and the proximity to free margins a Banner transposition flap was deemed most appropriate.  Using a sterile surgical marker, an appropriate Banner transposition flap was drawn incorporating the defect.    The area thus outlined was incised deep to adipose tissue with a #15 scalpel blade.  The skin margins were undermined to an appropriate distance in all directions utilizing iris scissors.
Unique Flap 5 Text: A decision was made to reconstruct the defect utilizing a tunneled myocutaneous Island pedicle Flap based on the anterior auricularis muscle.  A telfa template was made of the defect.  This telfa template was then used to outline the myocutaneous flap, based along the preauricular fold.  The donor area for the pedicle flap was then injected with anesthesia.  The flap was excised through the skin and subcutaneous tissue down to the layer of the underlying musculature.  The myocutaneous flap was carefully excised within this deep plane to maintain its blood supply based on the muscle. The edges of the donor site were undermined.   The donor site was closed in a primary fashion to the point of transposition.  The pedicle was then transposed through a tunnel into position and sutured.  Once the flap was sutured into place, adequate blood supply was confirmed with blanching and refill.
Unique Flap 6 Text: A decision was made to reconstruct the defect utilizing an Anti-aging-B?ch Flap (Bilateral helical Advancement Rotation Flap). Given the location of the defect and the proximity to free margins, this flap was deemed most appropriate.  Using a sterile surgical marker, the appropriate flaps were drawn incorporating the defect and placing the expected incisions within the relaxed skin tension lines where possible.  The area thus outlined was incised deep to adipose tissue with a #15 scalpel blade.  The skin margins were undermined to an appropriate distance in all directions utilizing iris scissors. Cartilage was incorporated into the flap arms to maintain helical anatomy.
Unique Flap 7 Text: A decision was made to reconstruct the defect utilizing a Mustarde Flap (Advancement Rotation Flap). Given the location of the defect and the proximity to free margins, this flap was deemed most appropriate.  Using a sterile surgical marker, the appropriate rotation flap was drawn incorporating the defect and placing the expected incisions within the relaxed skin tension lines where possible.    The area thus outlined was incised deep to adipose tissue with a #15 scalpel blade.  The skin margins were undermined to an appropriate distance in all directions utilizing iris scissors. The flap was advanced and rotated under the eyelid with a sling created laterally to keep ectropion minimal.
Unique Flap 8 Text: A decision was made to reconstruct the defect utilizing an East to West Flap (Modified Burows Advancement Flap). Given the location of the defect and the proximity to free margins, this flap was deemed most appropriate.  Using a sterile surgical marker, the appropriate advancement flaps were drawn incorporating the defect and placing the expected incisions within the relaxed skin tension lines where possible.    The area thus outlined was incised deep to adipose tissue with a #15 scalpel blade.  The skin margins were undermined to an appropriate distance in all directions utilizing iris scissors. Minimal alar distortion was created with flap approximation.
Manual Repair Warning Statement: We plan on removing the manually selected variable below in favor of our much easier automatic structured text blocks found in the previous tab. We decided to do this to help make the flow better and give you the full power of structured data. Manual selection is never going to be ideal in our platform and I would encourage you to avoid using manual selection from this point on, especially since I will be sunsetting this feature. It is important that you do one of two things with the customized text below. First, you can save all of the text in a word file so you can have it for future reference. Second, transfer the text to the appropriate area in the Library tab. Lastly, if there is a flap or graft type which we do not have you need to let us know right away so I can add it in before the variable is hidden. No need to panic, we plan to give you roughly 6 months to make the change.
Same Histology In Subsequent Stages Text: The pattern and morphology of the tumor is as described in the first stage.
No Residual Tumor Seen Histology Text: There were no malignant cells seen in the sections examined.
Inflammation Suggestive Of Cancer Camouflage Histology Text: There was a dense lymphocytic infiltrate which prevented adequate histologic evaluation of adjacent structures.
Bcc Histology Text: There were numerous aggregates of basaloid cells.
Bcc Infiltrative Histology Text: There were numerous aggregates of basaloid cells demonstrating an infiltrative pattern.
Mart-1 - Positive Histology Text: MART-1 staining demonstrates areas of higher density and clustering of melanocytes with Pagetoid spread upwards within the epidermis. The surgical margins are positive for tumor cells.
Mart-1 - Negative Histology Text: MART-1 staining demonstrates a normal density and pattern of melanocytes along the dermal-epidermal junction. The surgical margins are negative for tumor cells.
Information: Selecting Yes will display possible errors in your note based on the variables you have selected. This validation is only offered as a suggestion for you. PLEASE NOTE THAT THE VALIDATION TEXT WILL BE REMOVED WHEN YOU FINALIZE YOUR NOTE. IF YOU WANT TO FAX A PRELIMINARY NOTE YOU WILL NEED TO TOGGLE THIS TO 'NO' IF YOU DO NOT WANT IT IN YOUR FAXED NOTE.

## 2022-06-07 ENCOUNTER — APPOINTMENT (RX ONLY)
Dept: URBAN - METROPOLITAN AREA CLINIC 6 | Facility: CLINIC | Age: 72
Setting detail: DERMATOLOGY
End: 2022-06-07

## 2022-06-07 ENCOUNTER — APPOINTMENT (RX ONLY)
Dept: URBAN - METROPOLITAN AREA CLINIC 36 | Facility: CLINIC | Age: 72
Setting detail: DERMATOLOGY
End: 2022-06-07

## 2022-06-07 DIAGNOSIS — Z48.817 ENCOUNTER FOR SURGICAL AFTERCARE FOLLOWING SURGERY ON THE SKIN AND SUBCUTANEOUS TISSUE: ICD-10-CM

## 2022-06-07 DIAGNOSIS — L57.0 ACTINIC KERATOSIS: ICD-10-CM

## 2022-06-07 PROBLEM — C44.712 BASAL CELL CARCINOMA OF SKIN OF RIGHT LOWER LIMB, INCLUDING HIP: Status: ACTIVE | Noted: 2022-06-07

## 2022-06-07 PROCEDURE — 17000 DESTRUCT PREMALG LESION: CPT | Mod: 59

## 2022-06-07 PROCEDURE — 17262 DSTRJ MAL LES T/A/L 1.1-2.0: CPT

## 2022-06-07 PROCEDURE — ? POST-OP WOUND CHECK

## 2022-06-07 PROCEDURE — 99024 POSTOP FOLLOW-UP VISIT: CPT

## 2022-06-07 PROCEDURE — ? COUNSELING

## 2022-06-07 PROCEDURE — 17263 DSTRJ MAL LES T/A/L 2.1-3.0: CPT

## 2022-06-07 PROCEDURE — ? LIQUID NITROGEN

## 2022-06-07 PROCEDURE — ? CURETTAGE AND DESTRUCTION

## 2022-06-07 ASSESSMENT — LOCATION ZONE DERM
LOCATION ZONE: LEG
LOCATION ZONE: EAR

## 2022-06-07 ASSESSMENT — LOCATION SIMPLE DESCRIPTION DERM
LOCATION SIMPLE: LEFT ANKLE
LOCATION SIMPLE: RIGHT EAR

## 2022-06-07 ASSESSMENT — LOCATION DETAILED DESCRIPTION DERM
LOCATION DETAILED: LEFT POSTERIOR ANKLE
LOCATION DETAILED: RIGHT ANTIHELIX

## 2022-06-07 NOTE — PROCEDURE: POST-OP WOUND CHECK
Detail Level: Generalized
Add 63684 Cpt? (Important Note: In 2017 The Use Of 34327 Is Being Tracked By Cms To Determine Future Global Period Reimbursement For Global Periods): yes

## 2022-06-07 NOTE — PROCEDURE: LIQUID NITROGEN
Render Note In Bullet Format When Appropriate: No
Show Applicator Variable?: Yes
Duration Of Freeze Thaw-Cycle (Seconds): 8
Post-Care Instructions: I reviewed with the patient in detail post-care instructions. Patient is to wear sunprotection, and avoid picking at any of the treated lesions. Pt may apply Vaseline to crusted or scabbing areas.
Detail Level: Detailed
Number Of Freeze-Thaw Cycles: 2 freeze-thaw cycles
Consent: The patient's consent was obtained including but not limited to risks of crusting, scabbing, blistering, scarring, darker or lighter pigmentary change, recurrence, incomplete removal and infection.

## 2022-06-07 NOTE — PROCEDURE: MIPS QUALITY
Quality 111:Pneumonia Vaccination Status For Older Adults: Pneumococcal vaccine administered on or after patient’s 60th birthday and before the end of the measurement period
Quality 130: Documentation Of Current Medications In The Medical Record: Current Medications Documented
Detail Level: Detailed
Quality 226: Preventive Care And Screening: Tobacco Use: Screening And Cessation Intervention: Patient screened for tobacco use, is a smoker AND did not received Cessation Counseling for Unknown Reasons within the Previous 12 Months

## 2022-06-07 NOTE — PROCEDURE: CURETTAGE AND DESTRUCTION
Detail Level: Detailed
Biopsy Photograph Reviewed: Yes
Accession # (Optional): [id number]
Number Of Curettages: 2
Size Of Lesion In Cm: 1
Size Of Lesion After Curettage: 1.8
Add Intralesional Injection: No
Anesthesia Type: 1% lidocaine with epinephrine
Anesthesia Volume In Cc: 3
Cautery Type: electrodesiccation
What Was Performed First?: Curettage
Final Size Statement: The size of the lesion after treatment was
Additional Information: (Optional): The wound was cleaned, and a pressure dressing was applied.  The patient received detailed post-op instructions.
Consent: Written consent was obtained from the patient. The risks, benefits and alternatives to therapy were discussed in detail. Specifically, the risks of infection, scarring, bleeding, prolonged wound healing, nerve injury, incomplete removal, allergy to anesthesia and recurrence were addressed. Alternatives to ED&C, such as: surgical removal and XRT were also discussed.  Prior to the procedure, the treatment site was clearly identified and confirmed by the patient. All components of Universal Protocol/PAUSE Rule completed.
Post-Care Instructions: I reviewed with the patient in detail post-care instructions. Patient is to keep the area dry for 24-48 hours, and not to engage in any swimming until the area is healed. Should the patient develop any fevers, chills, bleeding, severe pain patient will contact the office immediately.
Bill As A Line Item Or As Units: Line Item
Size Of Lesion In Cm: 1.3
Size Of Lesion After Curettage: 2.1

## 2022-06-13 ENCOUNTER — APPOINTMENT (RX ONLY)
Dept: URBAN - METROPOLITAN AREA CLINIC 36 | Facility: CLINIC | Age: 72
Setting detail: DERMATOLOGY
End: 2022-06-13

## 2022-06-13 PROBLEM — C44.41 BASAL CELL CARCINOMA OF SKIN OF SCALP AND NECK: Status: ACTIVE | Noted: 2022-06-13

## 2022-06-13 PROCEDURE — 17312 MOHS ADDL STAGE: CPT | Mod: 79

## 2022-06-13 PROCEDURE — 17311 MOHS 1 STAGE H/N/HF/G: CPT | Mod: 79

## 2022-06-13 PROCEDURE — ? MOHS SURGERY

## 2022-06-13 PROCEDURE — 13121 CMPLX RPR S/A/L 2.6-7.5 CM: CPT | Mod: 79

## 2022-06-13 NOTE — PROCEDURE: MIPS QUALITY
Quality 130: Documentation Of Current Medications In The Medical Record: Current Medications Documented
Detail Level: Detailed
Quality 111:Pneumonia Vaccination Status For Older Adults: Pneumococcal Vaccination Previously Received
Quality 226: Preventive Care And Screening: Tobacco Use: Screening And Cessation Intervention: Patient screened for tobacco use and is an ex/non-smoker
Quality 226: Preventive Care And Screening: Tobacco Use: Screening And Cessation Intervention: Patient screened for tobacco use, is a smoker AND received Cessation Counseling within the Previous 12 Months

## 2022-06-13 NOTE — PROCEDURE: MOHS SURGERY
Mohs Case Number: 
Previous Accession (Optional): [id number]
Biopsy Photograph Reviewed: Yes
Referring Physician (Optional): Milton
Consent Type: Consent 1 (Standard)
Eye Shield Used: No
Surgeon Performing Repair (Optional): Ronald
Initial Size Of Lesion: 1
X Size Of Lesion In Cm (Optional): 0.8
Number Of Stages: 3
Primary Defect Length In Cm (Final Defect Size - Required For Flaps/Grafts): 1.8
Primary Defect Width In Cm (Final Defect Size - Required For Flaps/Grafts): 1.7
Repair Type: Complex Repair
Oculoplastic Surgeon (A): Benson
Oculoplastic Surgeon Procedure Text (A): After obtaining clear surgical margins the patient was sent to oculoplastics for surgical repair.  The patient understands they will receive post-surgical care and follow-up from the referring physician's office.
Otolaryngologist Procedure Text (A): After obtaining clear surgical margins the patient was sent to otolaryngology for surgical repair.  The patient understands they will receive post-surgical care and follow-up from the referring physician's office.
Plastic Surgeon Procedure Text (A): After obtaining clear surgical margins the patient was sent to plastics for surgical repair.  The patient understands they will receive post-surgical care and follow-up from the referring physician's office.
Mid-Level Procedure Text (A): After obtaining clear surgical margins the patient was sent to a mid-level provider for surgical repair.  The patient understands they will receive post-surgical care and follow-up from the mid-level provider.
Provider Procedure Text (A): After obtaining clear surgical margins the defect was repaired by another provider.
Asc Procedure Text (A): After obtaining clear surgical margins the patient was sent to an ASC for surgical repair.  The patient understands they will receive post-surgical care and follow-up from the ASC physician.
Simple / Intermediate / Complex Repair - Final Wound Length In Cm: 4.8
Suturegard Retention Suture: 2-0 Nylon
Retention Suture Bite Size: 3 mm
Length To Time In Minutes Device Was In Place: 10
Undermining Type: Entire Wound
Complex Requirements: Exposure Of Vital Structure?: Tendon
Name Of Tendon Exposed (Optional): sternocleidomastoid
Debridement Text: The wound edges were debrided prior to proceeding with the closure to facilitate wound healing.
Helical Rim Text: The closure involved the helical rim.
Vermilion Border Text: The closure involved the vermilion border.
Nostril Rim Text: The closure involved the nostril rim.
Retention Suture Text: Retention sutures were placed to support the closure and prevent dehiscence.
Secondary Defect Length In Cm (Required For Flaps): 0
Area H Indication Text: Tumors in this location are included in Area H (eyelids, eyebrows, nose, lips, chin, ear, pre-auricular, post-auricular, temple, genitalia, hands, feet, ankles and areola).  Tissue conservation is critical in these anatomic locations.
Area M Indication Text: Tumors in this location are included in Area M (cheek, forehead, scalp, neck, jawline and pretibial skin).  Mohs surgery is indicated for tumors in these anatomic locations.
Area L Indication Text: Tumors in this location are included in Area L (trunk and extremities).  Mohs surgery is indicated for larger tumors, or tumors with aggressive histologic features, in these anatomic locations.
Depth Of Tumor Invasion (For Histology): muscle
Perineural Invasion (For Histology - Be Specific If Possible): absent
Surgical Defect Length In Cm (Optional): 1.4
Surgical Defect Width In Cm (Optional): 1.3
Special Stains Stage 1 - Results: Base On Clearance Noted Above
Stage 2: Additional Anesthesia Type: 1% lidocaine with 1:100,000 epinephrine and 408mcg clindamycin/ml and a 1:10 solution of 8.4% sodium bicarbonate
Stage 4: Additional Anesthesia Type: 1% lidocaine with epinephrine
Include Tumor Staging In Mohs Note?: Please Select the Appropriate Response
Staging Info: By selecting yes to the question above you will include information on AJCC 8 tumor staging in your Mohs note. Information on tumor staging will be automatically added for SCCs on the head and neck. AJCC 8 includes tumor size, tumor depth, perineural involvement and bone invasion.
Tumor Depth: Less than 6mm from granular layer and no invasion beyond the subcutaneous fat
Medical Necessity Statement: Based on my medical judgement, Mohs surgery is the most appropriate treatment for this cancer compared to other treatments.
Alternatives Discussed Intro (Do Not Add Period): I discussed alternative treatments to Mohs surgery and specifically discussed the risks and benefits of
Consent 1/Introductory Paragraph: The rationale for Mohs was explained to the patient and consent was obtained. The risks, benefits and alternatives to therapy were discussed in detail. Specifically, the risks of infection, scarring, bleeding, prolonged wound healing, incomplete removal, allergy to anesthesia, nerve injury and recurrence were addressed. Prior to the procedure, the treatment site was clearly identified and confirmed by the patient. All components of Universal Protocol/PAUSE Rule completed.
Consent 2/Introductory Paragraph: Mohs surgery was explained to the patient and consent was obtained. The risks, benefits and alternatives to therapy were discussed in detail. Specifically, the risks of infection, scarring, bleeding, prolonged wound healing, incomplete removal, allergy to anesthesia, nerve injury and recurrence were addressed. Prior to the procedure, the treatment site was clearly identified and confirmed by the patient. All components of Universal Protocol/PAUSE Rule completed.
Consent 3/Introductory Paragraph: I gave the patient a chance to ask questions they had about the procedure.  Following this I explained the Mohs procedure and consent was obtained. The risks, benefits and alternatives to therapy were discussed in detail. Specifically, the risks of infection, scarring, bleeding, prolonged wound healing, incomplete removal, allergy to anesthesia, nerve injury and recurrence were addressed. Prior to the procedure, the treatment site was clearly identified and confirmed by the patient. All components of Universal Protocol/PAUSE Rule completed.
Consent (Temporal Branch)/Introductory Paragraph: The rationale for Mohs was explained to the patient and consent was obtained. The risks, benefits and alternatives to therapy were discussed in detail. Specifically, the risks of damage to the temporal branch of the facial nerve, infection, scarring, bleeding, prolonged wound healing, incomplete removal, allergy to anesthesia, and recurrence were addressed. Prior to the procedure, the treatment site was clearly identified and confirmed by the patient. All components of Universal Protocol/PAUSE Rule completed.
Consent (Marginal Mandibular)/Introductory Paragraph: The rationale for Mohs was explained to the patient and consent was obtained. The risks, benefits and alternatives to therapy were discussed in detail. Specifically, the risks of damage to the marginal mandibular branch of the facial nerve, infection, scarring, bleeding, prolonged wound healing, incomplete removal, allergy to anesthesia, and recurrence were addressed. Prior to the procedure, the treatment site was clearly identified and confirmed by the patient. All components of Universal Protocol/PAUSE Rule completed.
Consent (Spinal Accessory)/Introductory Paragraph: The rationale for Mohs was explained to the patient and consent was obtained. The risks, benefits and alternatives to therapy were discussed in detail. Specifically, the risks of damage to the spinal accessory nerve, infection, scarring, bleeding, prolonged wound healing, incomplete removal, allergy to anesthesia, and recurrence were addressed. Prior to the procedure, the treatment site was clearly identified and confirmed by the patient. All components of Universal Protocol/PAUSE Rule completed.
Consent (Near Eyelid Margin)/Introductory Paragraph: The rationale for Mohs was explained to the patient and consent was obtained. The risks, benefits and alternatives to therapy were discussed in detail. Specifically, the risks of ectropion or eyelid deformity, infection, scarring, bleeding, prolonged wound healing, incomplete removal, allergy to anesthesia, nerve injury and recurrence were addressed. Prior to the procedure, the treatment site was clearly identified and confirmed by the patient. All components of Universal Protocol/PAUSE Rule completed.
Consent (Ear)/Introductory Paragraph: The rationale for Mohs was explained to the patient and consent was obtained. The risks, benefits and alternatives to therapy were discussed in detail. Specifically, the risks of ear deformity, infection, scarring, bleeding, prolonged wound healing, incomplete removal, allergy to anesthesia, nerve injury and recurrence were addressed. Prior to the procedure, the treatment site was clearly identified and confirmed by the patient. All components of Universal Protocol/PAUSE Rule completed.
Consent (Nose)/Introductory Paragraph: The rationale for Mohs was explained to the patient and consent was obtained. The risks, benefits and alternatives to therapy were discussed in detail. Specifically, the risks of nasal deformity, changes in the flow of air through the nose, infection, scarring, bleeding, prolonged wound healing, incomplete removal, allergy to anesthesia, nerve injury and recurrence were addressed. Prior to the procedure, the treatment site was clearly identified and confirmed by the patient. All components of Universal Protocol/PAUSE Rule completed.
Consent (Lip)/Introductory Paragraph: The rationale for Mohs was explained to the patient and consent was obtained. The risks, benefits and alternatives to therapy were discussed in detail. Specifically, the risks of lip deformity, changes in the oral aperture, infection, scarring, bleeding, prolonged wound healing, incomplete removal, allergy to anesthesia, nerve injury and recurrence were addressed. Prior to the procedure, the treatment site was clearly identified and confirmed by the patient. All components of Universal Protocol/PAUSE Rule completed.
Consent (Scalp)/Introductory Paragraph: The rationale for Mohs was explained to the patient and consent was obtained. The risks, benefits and alternatives to therapy were discussed in detail. Specifically, the risks of changes in hair growth pattern secondary to repair, infection, scarring, bleeding, prolonged wound healing, incomplete removal, allergy to anesthesia, nerve injury and recurrence were addressed. Prior to the procedure, the treatment site was clearly identified and confirmed by the patient. All components of Universal Protocol/PAUSE Rule completed.
Detail Level: Detailed
Postop Diagnosis: same
Anesthesia Type: 0.5% lidocaine with 1:200,000 epinephrine and a 1:10 solution of 8.4% sodium bicarbonate and 408mcg clindamycin/ml
Anesthesia Volume In Cc: 2.9
Additional Anesthesia Volume In Cc: 6
Hemostasis: Electrocautery
Estimated Blood Loss (Cc): less than 5 cc
Repair Anesthesia Method: local infiltration
Brow Lift Text: A midfrontal incision was made medially to the defect to allow access to the tissues just superior to the left eyebrow. Following careful dissection inferiorly in a supraperiosteal plane to the level of the left eyebrow, several 3-0 monocryl sutures were used to resuspend the eyebrow orbicularis oculi muscular unit to the superior frontal bone periosteum. This resulted in an appropriate reapproximation of static eyebrow symmetry and correction of the left brow ptosis.
Deep Sutures: 4-0 Polysorb
Epidermal Sutures: 4-0 Prolene
Epidermal Closure: running cuticular
Suturegard Intro: Intraoperative tissue expansion was performed, utilizing the SUTUREGARD device, in order to reduce wound tension.
Suturegard Body: The suture ends were repeatedly re-tightened and re-clamped to achieve the desired tissue expansion.
Hemigard Intro: Due to skin fragility and wound tension, it was decided to use HEMIGARD adhesive retention suture devices to permit a linear closure. The skin was cleaned and dried for a 6cm distance away from the wound. Excessive hair, if present, was removed to allow for adhesion.
Hemigard Postcare Instructions: The HEMIGARD strips are to remain completely dry for at least 5-7 days.
Donor Site Anesthesia Type: same as repair anesthesia
Graft Basting Suture (Optional): 5-0 Fast Absorbing Gut
Graft Donor Site Epidermal Sutures (Optional): 5-0 Ethibond
Epidermal Closure Graft Donor Site (Optional): simple interrupted
Graft Donor Site Bandage (Optional-Leave Blank If You Don't Want In Note): Aquaphor and telefa placed on wound. Pressure dressing applied to donor site
Closure 2 Information: This tab is for additional flaps and grafts, including complex repair and grafts and complex repair and flaps. You can also specify a different location for the additional defect, if the location is the same you do not need to select a new one. We will insert the automated text for the repair you select below just as we do for solitary flaps and grafts. Please note that at this time if you select a location with a different insurance zone you will need to override the ICD10 and CPT if appropriate.
Closure 3 Information: This tab is for additional flaps and grafts above and beyond our usual structured repairs.  Please note if you enter information here it will not currently bill and you will need to add the billing information manually.
Wound Care: Aquaphor
Dressing: dry sterile dressing
Wound Care (No Sutures): Petrolatum
Suture Removal: 7 days
Unna Boot Text: An Unna boot was placed to help immobilize the limb and facilitate more rapid healing.
Home Suture Removal Text: Patient was provided instructions on removing sutures and will remove their sutures at home.  If they have any questions or difficulties they will call the office.
Post-Care Instructions: I reviewed with the patient in detail post-care instructions. Patient is not to engage in any heavy lifting, exercise, or swimming for the next 14 days. Should the patient develop any fevers, chills, bleeding, severe pain patient will contact the office immediately.
Pain Refusal Text: I offered to prescribe pain medication but the patient refused to take this medication.
Mauc Instructions: By selecting yes to the question below the MAUC number will be added into the note.  This will be calculated automatically based on the diagnosis chosen, the size entered, the body zone selected (H,M,L) and the specific indications you chose. You will also have the option to override the Mohs AUC if you disagree with the automatically calculated number and this option is found in the Case Summary tab.
Where Do You Want The Question To Include Opioid Counseling Located?: Case Summary Tab
Eye Protection Verbiage: Before proceeding with the stage, a plastic scleral shield was inserted. The globe was anesthetized with a few drops of 1% lidocaine with 1:100,000 epinephrine. Then, an appropriate sized scleral shield was chosen and coated with lacrilube ointment. The shield was gently inserted and left in place for the duration of each stage. After the stage was completed, the shield was gently removed.
Mohs Method Verbiage: An incision at a 45 degree angle following the standard Mohs approach was done and the specimen was harvested as a microscopic controlled layer.
Surgeon/Pathologist Verbiage (Will Incorporate Name Of Surgeon From Intro If Not Blank): operated in two distinct and integrated capacities as the surgeon and pathologist.
Mohs Histo Method Verbiage: Each section was then chromacoded and processed in the Mohs lab using the Mohs protocol and submitted for frozen section.
Subsequent Stages Histo Method Verbiage: Using a similar technique to that described above, a thin layer of tissue was removed from all areas where tumor was visible on the previous stage.  The tissue was again oriented, mapped, dyed, and processed as above.
Mohs Rapid Report Verbiage: The area of clinically evident tumor was marked with skin marking ink and appropriately hatched.  The initial incision was made following the Mohs approach through the skin.  The specimen was taken to the lab, divided into the necessary number of pieces, chromacoded and processed according to the Mohs protocol.  This was repeated in successive stages until a tumor free defect was achieved.
Complex Repair Preamble Text (Leave Blank If You Do Not Want): Extensive wide undermining was performed at least 2 cm in all directions.
Intermediate Repair Preamble Text (Leave Blank If You Do Not Want): Undermining was performed with blunt dissection.
M-Plasty Complex Repair Preamble Text (Leave Blank If You Do Not Want): Extensive wide undermining was performed.
Non-Graft Cartilage Fenestration Text: The cartilage was fenestrated with a 2mm punch biopsy to help facilitate healing.
Graft Cartilage Fenestration Text: The cartilage was fenestrated with a 2mm punch biopsy to help facilitate graft survival and healing.
Secondary Intention Text (Leave Blank If You Do Not Want): The defect will heal with secondary intention.
No Repair - Repaired With Adjacent Surgical Defect Text (Leave Blank If You Do Not Want): After obtaining clear surgical margins the defect was repaired concurrently with another surgical defect which was in close approximation.
Adjacent Tissue Transfer Text: The defect edges were debeveled with a #15 scalpel blade.  Given the location of the defect and the proximity to free margins an adjacent tissue transfer was deemed most appropriate.  Using a sterile surgical marker, an appropriate flap was drawn incorporating the defect and placing the expected incisions within the relaxed skin tension lines where possible.    The area thus outlined was incised deep to adipose tissue with a #15 scalpel blade.  The skin margins were undermined to an appropriate distance in all directions utilizing iris scissors.
Advancement Flap (Single) Text: The defect edges were debeveled with a #15 scalpel blade.  Given the location of the defect and the proximity to free margins a single advancement flap was deemed most appropriate.  Using a sterile surgical marker, an appropriate advancement flap was drawn incorporating the defect and placing the expected incisions within the relaxed skin tension lines where possible.    The area thus outlined was incised deep to adipose tissue with a #15 scalpel blade.  The skin margins were undermined to an appropriate distance in all directions utilizing iris scissors.
Advancement Flap (Double) Text: The defect edges were debeveled with a #15 scalpel blade.  Given the location of the defect and the proximity to free margins a double advancement flap was deemed most appropriate.  Using a sterile surgical marker, the appropriate advancement flaps were drawn incorporating the defect and placing the expected incisions within the relaxed skin tension lines where possible.    The area thus outlined was incised deep to adipose tissue with a #15 scalpel blade.  The skin margins were undermined to an appropriate distance in all directions utilizing iris scissors.
Burow's Advancement Flap Text: The defect edges were debeveled with a #15 scalpel blade.  Given the location of the defect and the proximity to free margins a Burow's advancement flap was deemed most appropriate.  Using a sterile surgical marker, the appropriate advancement flap was drawn incorporating the defect and placing the expected incisions within the relaxed skin tension lines where possible.    The area thus outlined was incised deep to adipose tissue with a #15 scalpel blade.  The skin margins were undermined to an appropriate distance in all directions utilizing iris scissors.
Chonodrocutaneous Helical Advancement Flap Text: The defect edges were debeveled with a #15 scalpel blade.  Given the location of the defect and the proximity to free margins a chondrocutaneous helical advancement flap was deemed most appropriate.  Using a sterile surgical marker, the appropriate advancement flap was drawn incorporating the defect and placing the expected incisions within the relaxed skin tension lines where possible.    The area thus outlined was incised deep to adipose tissue with a #15 scalpel blade.  The skin margins were undermined to an appropriate distance in all directions utilizing iris scissors.
Crescentic Advancement Flap Text: The defect edges were debeveled with a #15 scalpel blade.  Given the location of the defect and the proximity to free margins a crescentic advancement flap was deemed most appropriate.  Using a sterile surgical marker, the appropriate advancement flap was drawn incorporating the defect and placing the expected incisions within the relaxed skin tension lines where possible.    The area thus outlined was incised deep to adipose tissue with a #15 scalpel blade.  The skin margins were undermined to an appropriate distance in all directions utilizing iris scissors.
A-T Advancement Flap Text: The defect edges were debeveled with a #15 scalpel blade.  Given the location of the defect, shape of the defect and the proximity to free margins an A-T advancement flap was deemed most appropriate.  Using a sterile surgical marker, an appropriate advancement flap was drawn incorporating the defect and placing the expected incisions within the relaxed skin tension lines where possible.    The area thus outlined was incised deep to adipose tissue with a #15 scalpel blade.  The skin margins were undermined to an appropriate distance in all directions utilizing iris scissors.
O-T Advancement Flap Text: The defect edges were debeveled with a #15 scalpel blade.  Given the location of the defect, shape of the defect and the proximity to free margins an O-T advancement flap was deemed most appropriate.  Using a sterile surgical marker, an appropriate advancement flap was drawn incorporating the defect and placing the expected incisions within the relaxed skin tension lines where possible.    The area thus outlined was incised deep to adipose tissue with a #15 scalpel blade.  The skin margins were undermined to an appropriate distance in all directions utilizing iris scissors.
O-L Flap Text: The defect edges were debeveled with a #15 scalpel blade.  Given the location of the defect, shape of the defect and the proximity to free margins an O-L flap was deemed most appropriate.  Using a sterile surgical marker, an appropriate advancement flap was drawn incorporating the defect and placing the expected incisions within the relaxed skin tension lines where possible.    The area thus outlined was incised deep to adipose tissue with a #15 scalpel blade.  The skin margins were undermined to an appropriate distance in all directions utilizing iris scissors.
O-Z Flap Text: The defect edges were debeveled with a #15 scalpel blade.  Given the location of the defect, shape of the defect and the proximity to free margins an O-Z flap was deemed most appropriate.  Using a sterile surgical marker, an appropriate transposition flap was drawn incorporating the defect and placing the expected incisions within the relaxed skin tension lines where possible. The area thus outlined was incised deep to adipose tissue with a #15 scalpel blade.  The skin margins were undermined to an appropriate distance in all directions utilizing iris scissors.
Double O-Z Flap Text: The defect edges were debeveled with a #15 scalpel blade.  Given the location of the defect, shape of the defect and the proximity to free margins a Double O-Z flap was deemed most appropriate.  Using a sterile surgical marker, an appropriate transposition flap was drawn incorporating the defect and placing the expected incisions within the relaxed skin tension lines where possible. The area thus outlined was incised deep to adipose tissue with a #15 scalpel blade.  The skin margins were undermined to an appropriate distance in all directions utilizing iris scissors.
V-Y Flap Text: The defect edges were debeveled with a #15 scalpel blade.  Given the location of the defect, shape of the defect and the proximity to free margins a V-Y flap was deemed most appropriate.  Using a sterile surgical marker, an appropriate advancement flap was drawn incorporating the defect and placing the expected incisions within the relaxed skin tension lines where possible.    The area thus outlined was incised deep to adipose tissue with a #15 scalpel blade.  The skin margins were undermined to an appropriate distance in all directions utilizing iris scissors.
Advancement-Rotation Flap Text: The defect edges were debeveled with a #15 scalpel blade.  Given the location of the defect, shape of the defect and the proximity to free margins an advancement-rotation flap was deemed most appropriate.  Using a sterile surgical marker, an appropriate flap was drawn incorporating the defect and placing the expected incisions within the relaxed skin tension lines where possible. The area thus outlined was incised deep to adipose tissue with a #15 scalpel blade.  The skin margins were undermined to an appropriate distance in all directions utilizing iris scissors.
Mercedes Flap Text: The defect edges were debeveled with a #15 scalpel blade.  Given the location of the defect, shape of the defect and the proximity to free margins a Mercedes flap was deemed most appropriate.  Using a sterile surgical marker, an appropriate advancement flap was drawn incorporating the defect and placing the expected incisions within the relaxed skin tension lines where possible. The area thus outlined was incised deep to adipose tissue with a #15 scalpel blade.  The skin margins were undermined to an appropriate distance in all directions utilizing iris scissors.
Modified Advancement Flap Text: The defect edges were debeveled with a #15 scalpel blade.  Given the location of the defect, shape of the defect and the proximity to free margins a modified advancement flap was deemed most appropriate.  Using a sterile surgical marker, an appropriate advancement flap was drawn incorporating the defect and placing the expected incisions within the relaxed skin tension lines where possible.    The area thus outlined was incised deep to adipose tissue with a #15 scalpel blade.  The skin margins were undermined to an appropriate distance in all directions utilizing iris scissors.
Mucosal Advancement Flap Text: Given the location of the defect, shape of the defect and the proximity to free margins a mucosal advancement flap was deemed most appropriate. Incisions were made with a 15 blade scalpel in the appropriate fashion along the cutaneous vermilion border and the mucosal lip. The remaining actinically damaged mucosal tissue was excised.  The mucosal advancement flap was then elevated to the gingival sulcus with care taken to preserve the neurovascular structures and advanced into the primary defect. Care was taken to ensure that precise realignment of the vermilion border was achieved.
Peng Advancement Flap Text: The defect edges were debeveled with a #15 scalpel blade.  Given the location of the defect, shape of the defect and the proximity to free margins a Peng advancement flap was deemed most appropriate.  Using a sterile surgical marker, an appropriate advancement flap was drawn incorporating the defect and placing the expected incisions within the relaxed skin tension lines where possible. The area thus outlined was incised deep to adipose tissue with a #15 scalpel blade.  The skin margins were undermined to an appropriate distance in all directions utilizing iris scissors.
Hatchet Flap Text: The defect edges were debeveled with a #15 scalpel blade.  Given the location of the defect, shape of the defect and the proximity to free margins a hatchet flap based from the glabella was deemed most appropriate.  Using a sterile surgical marker, an appropriate glabellar hatchet flap was drawn incorporating the defect and placing the expected incisions within the relaxed skin tension lines where possible.    The area thus outlined was incised deep to adipose tissue with a #15 scalpel blade.  The skin margins were undermined to an appropriate distance in all directions utilizing iris scissors.
Rotation Flap Text: The defect edges were debeveled with a #15 scalpel blade.  Given the location of the defect, shape of the defect and the proximity to free margins a rotation flap was deemed most appropriate.  Using a sterile surgical marker, an appropriate rotation flap was drawn incorporating the defect and placing the expected incisions within the relaxed skin tension lines where possible.    The area thus outlined was incised deep to adipose tissue with a #15 scalpel blade.  The skin margins were undermined to an appropriate distance in all directions utilizing iris scissors.
Spiral Flap Text: The defect edges were debeveled with a #15 scalpel blade.  Given the location of the defect, shape of the defect and the proximity to free margins a spiral flap was deemed most appropriate.  Using a sterile surgical marker, an appropriate rotation flap was drawn incorporating the defect and placing the expected incisions within the relaxed skin tension lines where possible. The area thus outlined was incised deep to adipose tissue with a #15 scalpel blade.  The skin margins were undermined to an appropriate distance in all directions utilizing iris scissors.
Staged Advancement Flap Text: The defect edges were debeveled with a #15 scalpel blade.  Given the location of the defect, shape of the defect and the proximity to free margins a staged advancement flap was deemed most appropriate.  Using a sterile surgical marker, an appropriate advancement flap was drawn incorporating the defect and placing the expected incisions within the relaxed skin tension lines where possible. The area thus outlined was incised deep to adipose tissue with a #15 scalpel blade.  The skin margins were undermined to an appropriate distance in all directions utilizing iris scissors.
Star Wedge Flap Text: The defect edges were debeveled with a #15 scalpel blade.  Given the location of the defect, shape of the defect and the proximity to free margins a star wedge flap was deemed most appropriate.  Using a sterile surgical marker, an appropriate rotation flap was drawn incorporating the defect and placing the expected incisions within the relaxed skin tension lines where possible. The area thus outlined was incised deep to adipose tissue with a #15 scalpel blade.  The skin margins were undermined to an appropriate distance in all directions utilizing iris scissors.
Transposition Flap Text: The defect edges were debeveled with a #15 scalpel blade.  Given the location of the defect and the proximity to free margins a transposition flap was deemed most appropriate.  Using a sterile surgical marker, an appropriate transposition flap was drawn incorporating the defect.    The area thus outlined was incised deep to adipose tissue with a #15 scalpel blade.  The skin margins were undermined to an appropriate distance in all directions utilizing iris scissors.
Muscle Hinge Flap Text: The defect edges were debeveled with a #15 scalpel blade.  Given the size, depth and location of the defect and the proximity to free margins a muscle hinge flap was deemed most appropriate.  Using a sterile surgical marker, an appropriate hinge flap was drawn incorporating the defect. The area thus outlined was incised with a #15 scalpel blade.  The skin margins were undermined to an appropriate distance in all directions utilizing iris scissors.
Mustarde Flap Text: The defect edges were debeveled with a #15 scalpel blade.  Given the size, depth and location of the defect and the proximity to free margins a Mustarde flap was deemed most appropriate.  Using a sterile surgical marker, an appropriate flap was drawn incorporating the defect. The area thus outlined was incised with a #15 scalpel blade.  The skin margins were undermined to an appropriate distance in all directions utilizing iris scissors.
Nasal Turnover Hinge Flap Text: The defect edges were debeveled with a #15 scalpel blade.  Given the size, depth, location of the defect and the defect being full thickness a nasal turnover hinge flap was deemed most appropriate.  Using a sterile surgical marker, an appropriate hinge flap was drawn incorporating the defect. The area thus outlined was incised with a #15 scalpel blade. The flap was designed to recreate the nasal mucosal lining and the alar rim. The skin margins were undermined to an appropriate distance in all directions utilizing iris scissors.
Nasalis-Muscle-Based Myocutaneous Island Pedicle Flap Text: Using a #15 blade, an incision was made around the donor flap to the level of the nasalis muscle. Wide lateral undermining was then performed in both the subcutaneous plane above the nasalis muscle, and in a submuscular plane just above periosteum. This allowed the formation of a free nasalis muscle axial pedicle (based on the angular artery) which was still attached to the actual cutaneous flap, increasing its mobility and vascular viability. Hemostasis was obtained with pinpoint electrocoagulation. The flap was mobilized into position and the pivotal anchor points positioned and stabilized with buried interrupted sutures. Subcutaneous and dermal tissues were closed in a multilayered fashion with sutures. Tissue redundancies were excised, and the epidermal edges were apposed without significant tension and sutured with sutures.
Orbicularis Oris Muscle Flap Text: The defect edges were debeveled with a #15 scalpel blade.  Given that the defect affected the competency of the oral sphincter an orbicularis oris muscle flap was deemed most appropriate to restore this competency and normal muscle function.  Using a sterile surgical marker, an appropriate flap was drawn incorporating the defect. The area thus outlined was incised with a #15 scalpel blade.
Melolabial Transposition Flap Text: The defect edges were debeveled with a #15 scalpel blade.  Given the location of the defect and the proximity to free margins a melolabial flap was deemed most appropriate.  Using a sterile surgical marker, an appropriate melolabial transposition flap was drawn incorporating the defect.    The area thus outlined was incised deep to adipose tissue with a #15 scalpel blade.  The skin margins were undermined to an appropriate distance in all directions utilizing iris scissors.
Rhombic Flap Text: The defect edges were debeveled with a #15 scalpel blade.  Given the location of the defect and the proximity to free margins a rhombic flap was deemed most appropriate.  Using a sterile surgical marker, an appropriate rhombic flap was drawn incorporating the defect.    The area thus outlined was incised deep to adipose tissue with a #15 scalpel blade.  The skin margins were undermined to an appropriate distance in all directions utilizing iris scissors.
Rhomboid Transposition Flap Text: The defect edges were debeveled with a #15 scalpel blade.  Given the location of the defect and the proximity to free margins a rhomboid transposition flap was deemed most appropriate.  Using a sterile surgical marker, an appropriate rhomboid flap was drawn incorporating the defect.    The area thus outlined was incised deep to adipose tissue with a #15 scalpel blade.  The skin margins were undermined to an appropriate distance in all directions utilizing iris scissors.
Bi-Rhombic Flap Text: The defect edges were debeveled with a #15 scalpel blade.  Given the location of the defect and the proximity to free margins a bi-rhombic flap was deemed most appropriate.  Using a sterile surgical marker, an appropriate rhombic flap was drawn incorporating the defect. The area thus outlined was incised deep to adipose tissue with a #15 scalpel blade.  The skin margins were undermined to an appropriate distance in all directions utilizing iris scissors.
Helical Rim Advancement Flap Text: The defect edges were debeveled with a #15 blade scalpel.  Given the location of the defect and the proximity to free margins (helical rim) a double helical rim advancement flap was deemed most appropriate.  Using a sterile surgical marker, the appropriate advancement flaps were drawn incorporating the defect and placing the expected incisions between the helical rim and antihelix where possible.  The area thus outlined was incised through and through with a #15 scalpel blade.  With a skin hook and iris scissors, the flaps were gently and sharply undermined and freed up.
Bilateral Helical Rim Advancement Flap Text: The defect edges were debeveled with a #15 blade scalpel.  Given the location of the defect and the proximity to free margins (helical rim) a bilateral helical rim advancement flap was deemed most appropriate.  Using a sterile surgical marker, the appropriate advancement flaps were drawn incorporating the defect and placing the expected incisions between the helical rim and antihelix where possible.  The area thus outlined was incised through and through with a #15 scalpel blade.  With a skin hook and iris scissors, the flaps were gently and sharply undermined and freed up.
Ear Star Wedge Flap Text: The defect edges were debeveled with a #15 blade scalpel.  Given the location of the defect and the proximity to free margins (helical rim) an ear star wedge flap was deemed most appropriate.  Using a sterile surgical marker, the appropriate flap was drawn incorporating the defect and placing the expected incisions between the helical rim and antihelix where possible.  The area thus outlined was incised through and through with a #15 scalpel blade.
Banner Transposition Flap Text: The defect edges were debeveled with a #15 scalpel blade.  Given the location of the defect and the proximity to free margins a Banner transposition flap was deemed most appropriate.  Using a sterile surgical marker, an appropriate flap drawn around the defect. The area thus outlined was incised deep to adipose tissue with a #15 scalpel blade.  The skin margins were undermined to an appropriate distance in all directions utilizing iris scissors.
Bilobed Flap Text: The defect edges were debeveled with a #15 scalpel blade.  Given the location of the defect and the proximity to free margins a bilobe flap was deemed most appropriate.  Using a sterile surgical marker, an appropriate bilobe flap drawn around the defect.    The area thus outlined was incised deep to adipose tissue with a #15 scalpel blade.  The skin margins were undermined to an appropriate distance in all directions utilizing iris scissors.
Bilobed Transposition Flap Text: The defect edges were debeveled with a #15 scalpel blade.  Given the location of the defect and the proximity to free margins a bilobed transposition flap was deemed most appropriate.  Using a sterile surgical marker, an appropriate bilobe flap drawn around the defect.    The area thus outlined was incised deep to adipose tissue with a #15 scalpel blade.  The skin margins were undermined to an appropriate distance in all directions utilizing iris scissors.
Trilobed Flap Text: The defect edges were debeveled with a #15 scalpel blade.  Given the location of the defect and the proximity to free margins a trilobed flap was deemed most appropriate.  Using a sterile surgical marker, an appropriate trilobed flap drawn around the defect.    The area thus outlined was incised deep to adipose tissue with a #15 scalpel blade.  The skin margins were undermined to an appropriate distance in all directions utilizing iris scissors.
Dorsal Nasal Flap Text: The defect edges were debeveled with a #15 scalpel blade.  Given the location of the defect and the proximity to free margins a dorsal nasal flap,based upon the glabellar folds, was deemed most appropriate.  Using a sterile surgical marker, an appropriate dorsal nasal flap was drawn around the defect.    The area thus outlined was incised deep to adipose tissue with a #15 scalpel blade.  The skin margins were undermined to an appropriate distance in all directions utilizing iris scissors.
Island Pedicle Flap Text: The defect edges were debeveled with a #15 scalpel blade.  Given the location of the defect, shape of the defect and the proximity to free margins an island pedicle advancement flap was deemed most appropriate.  Using a sterile surgical marker, an appropriate advancement flap was drawn incorporating the defect, outlining the appropriate donor tissue and placing the expected incisions within the relaxed skin tension lines where possible.    The area thus outlined was incised deep to adipose tissue with a #15 scalpel blade.  The skin margins were undermined to an appropriate distance in all directions around the primary defect and laterally outward around the island pedicle utilizing iris scissors.  There was minimal undermining beneath the pedicle flap.
Island Pedicle Flap With Canthal Suspension Text: The defect edges were debeveled with a #15 scalpel blade.  Given the location of the defect, shape of the defect and the proximity to free margins an island pedicle advancement flap was deemed most appropriate.  Using a sterile surgical marker, an appropriate advancement flap was drawn incorporating the defect, outlining the appropriate donor tissue and placing the expected incisions within the relaxed skin tension lines where possible. The area thus outlined was incised deep to adipose tissue with a #15 scalpel blade.  The skin margins were undermined to an appropriate distance in all directions around the primary defect and laterally outward around the island pedicle utilizing iris scissors.  There was minimal undermining beneath the pedicle flap. A suspension suture was placed in the canthal tendon to prevent tension and prevent ectropion.
Alar Island Pedicle Flap Text: The defect edges were debeveled with a #15 scalpel blade.  Given the location of the defect, shape of the defect and the proximity to the alar rim an island pedicle advancement flap was deemed most appropriate.  Using a sterile surgical marker, an appropriate advancement flap was drawn incorporating the defect, outlining the appropriate donor tissue and placing the expected incisions within the nasal ala running parallel to the alar rim. The area thus outlined was incised with a #15 scalpel blade.  The skin margins were undermined minimally to an appropriate distance in all directions around the primary defect and laterally outward around the island pedicle utilizing iris scissors.  There was minimal undermining beneath the pedicle flap.
Double Island Pedicle Flap Text: The defect edges were debeveled with a #15 scalpel blade.  Given the location of the defect, shape of the defect and the proximity to free margins a double island pedicle advancement flap was deemed most appropriate.  Using a sterile surgical marker, an appropriate advancement flap was drawn incorporating the defect, outlining the appropriate donor tissue and placing the expected incisions within the relaxed skin tension lines where possible.    The area thus outlined was incised deep to adipose tissue with a #15 scalpel blade.  The skin margins were undermined to an appropriate distance in all directions around the primary defect and laterally outward around the island pedicle utilizing iris scissors.  There was minimal undermining beneath the pedicle flap.
Island Pedicle Flap-Requiring Vessel Identification Text: The defect edges were debeveled with a #15 scalpel blade.  Given the location of the defect, shape of the defect and the proximity to free margins an island pedicle advancement flap was deemed most appropriate.  Using a sterile surgical marker, an appropriate advancement flap was drawn, based on the axial vessel mentioned above, incorporating the defect, outlining the appropriate donor tissue and placing the expected incisions within the relaxed skin tension lines where possible.    The area thus outlined was incised deep to adipose tissue with a #15 scalpel blade.  The skin margins were undermined to an appropriate distance in all directions around the primary defect and laterally outward around the island pedicle utilizing iris scissors.  There was minimal undermining beneath the pedicle flap.
Keystone Flap Text: The defect edges were debeveled with a #15 scalpel blade.  Given the location of the defect, shape of the defect a keystone flap was deemed most appropriate.  Using a sterile surgical marker, an appropriate keystone flap was drawn incorporating the defect, outlining the appropriate donor tissue and placing the expected incisions within the relaxed skin tension lines where possible. The area thus outlined was incised deep to adipose tissue with a #15 scalpel blade.  The skin margins were undermined to an appropriate distance in all directions around the primary defect and laterally outward around the flap utilizing iris scissors.
O-T Plasty Text: The defect edges were debeveled with a #15 scalpel blade.  Given the location of the defect, shape of the defect and the proximity to free margins an O-T plasty was deemed most appropriate.  Using a sterile surgical marker, an appropriate O-T plasty was drawn incorporating the defect and placing the expected incisions within the relaxed skin tension lines where possible.    The area thus outlined was incised deep to adipose tissue with a #15 scalpel blade.  The skin margins were undermined to an appropriate distance in all directions utilizing iris scissors.
O-Z Plasty Text: The defect edges were debeveled with a #15 scalpel blade.  Given the location of the defect, shape of the defect and the proximity to free margins an O-Z plasty (double transposition flap) was deemed most appropriate.  Using a sterile surgical marker, the appropriate transposition flaps were drawn incorporating the defect and placing the expected incisions within the relaxed skin tension lines where possible.    The area thus outlined was incised deep to adipose tissue with a #15 scalpel blade.  The skin margins were undermined to an appropriate distance in all directions utilizing iris scissors.  Hemostasis was achieved with electrocautery.  The flaps were then transposed into place, one clockwise and the other counterclockwise, and anchored with interrupted buried subcutaneous sutures.
Double O-Z Plasty Text: The defect edges were debeveled with a #15 scalpel blade.  Given the location of the defect, shape of the defect and the proximity to free margins a Double O-Z plasty (double transposition flap) was deemed most appropriate.  Using a sterile surgical marker, the appropriate transposition flaps were drawn incorporating the defect and placing the expected incisions within the relaxed skin tension lines where possible. The area thus outlined was incised deep to adipose tissue with a #15 scalpel blade.  The skin margins were undermined to an appropriate distance in all directions utilizing iris scissors.  Hemostasis was achieved with electrocautery.  The flaps were then transposed into place, one clockwise and the other counterclockwise, and anchored with interrupted buried subcutaneous sutures.
V-Y Plasty Text: The defect edges were debeveled with a #15 scalpel blade.  Given the location of the defect, shape of the defect and the proximity to free margins an V-Y advancement flap was deemed most appropriate.  Using a sterile surgical marker, an appropriate advancement flap was drawn incorporating the defect and placing the expected incisions within the relaxed skin tension lines where possible.    The area thus outlined was incised deep to adipose tissue with a #15 scalpel blade.  The skin margins were undermined to an appropriate distance in all directions utilizing iris scissors.
H Plasty Text: Given the location of the defect, shape of the defect and the proximity to free margins a H-plasty was deemed most appropriate for repair.  Using a sterile surgical marker, the appropriate advancement arms of the H-plasty were drawn incorporating the defect and placing the expected incisions within the relaxed skin tension lines where possible. The area thus outlined was incised deep to adipose tissue with a #15 scalpel blade. The skin margins were undermined to an appropriate distance in all directions utilizing iris scissors.  The opposing advancement arms were then advanced into place in opposite direction and anchored with interrupted buried subcutaneous sutures.
W Plasty Text: The lesion was extirpated to the level of the fat with a #15 scalpel blade.  Given the location of the defect, shape of the defect and the proximity to free margins a W-plasty was deemed most appropriate for repair.  Using a sterile surgical marker, the appropriate transposition arms of the W-plasty were drawn incorporating the defect and placing the expected incisions within the relaxed skin tension lines where possible.    The area thus outlined was incised deep to adipose tissue with a #15 scalpel blade.  The skin margins were undermined to an appropriate distance in all directions utilizing iris scissors.  The opposing transposition arms were then transposed into place in opposite direction and anchored with interrupted buried subcutaneous sutures.
Z Plasty Text: The lesion was extirpated to the level of the fat with a #15 scalpel blade.  Given the location of the defect, shape of the defect and the proximity to free margins a Z-plasty was deemed most appropriate for repair.  Using a sterile surgical marker, the appropriate transposition arms of the Z-plasty were drawn incorporating the defect and placing the expected incisions within the relaxed skin tension lines where possible.    The area thus outlined was incised deep to adipose tissue with a #15 scalpel blade.  The skin margins were undermined to an appropriate distance in all directions utilizing iris scissors.  The opposing transposition arms were then transposed into place in opposite direction and anchored with interrupted buried subcutaneous sutures.
Zygomaticofacial Flap Text: Given the location of the defect, shape of the defect and the proximity to free margins a zygomaticofacial flap was deemed most appropriate for repair.  Using a sterile surgical marker, the appropriate flap was drawn incorporating the defect and placing the expected incisions within the relaxed skin tension lines where possible. The area thus outlined was incised deep to adipose tissue with a #15 scalpel blade with preservation of a vascular pedicle.  The skin margins were undermined to an appropriate distance in all directions utilizing iris scissors.  The flap was then placed into the defect and anchored with interrupted buried subcutaneous sutures.
Cheek Interpolation Flap Text: A decision was made to reconstruct the defect utilizing an interpolation axial flap and a staged reconstruction.  A telfa template was made of the defect.  This telfa template was then used to outline the Cheek Interpolation flap.  The donor area for the pedicle flap was then injected with anesthesia.  The flap was excised through the skin and subcutaneous tissue down to the layer of the underlying musculature.  The interpolation flap was carefully excised within this deep plane to maintain its blood supply.  The edges of the donor site were undermined.   The donor site was closed in a primary fashion.  The pedicle was then rotated into position and sutured.  Once the tube was sutured into place, adequate blood supply was confirmed with blanching and refill.  The pedicle was then wrapped with xeroform gauze and dressed appropriately with a telfa and gauze bandage to ensure continued blood supply and protect the attached pedicle.
Cheek-To-Nose Interpolation Flap Text: A decision was made to reconstruct the defect utilizing an interpolation axial flap and a staged reconstruction.  A telfa template was made of the defect.  This telfa template was then used to outline the Cheek-To-Nose Interpolation flap.  The donor area for the pedicle flap was then injected with anesthesia.  The flap was excised through the skin and subcutaneous tissue down to the layer of the underlying musculature.  The interpolation flap was carefully excised within this deep plane to maintain its blood supply.  The edges of the donor site were undermined.   The donor site was closed in a primary fashion.  The pedicle was then rotated into position and sutured.  Once the tube was sutured into place, adequate blood supply was confirmed with blanching and refill.  The pedicle was then wrapped with xeroform gauze and dressed appropriately with a telfa and gauze bandage to ensure continued blood supply and protect the attached pedicle.
Interpolation Flap Text: A decision was made to reconstruct the defect utilizing an interpolation axial flap and a staged reconstruction.  A telfa template was made of the defect.  This telfa template was then used to outline the interpolation flap.  The donor area for the pedicle flap was then injected with anesthesia.  The flap was excised through the skin and subcutaneous tissue down to the layer of the underlying musculature.  The interpolation flap was carefully excised within this deep plane to maintain its blood supply.  The edges of the donor site were undermined.   The donor site was closed in a primary fashion.  The pedicle was then rotated into position and sutured.  Once the tube was sutured into place, adequate blood supply was confirmed with blanching and refill.  The pedicle was then wrapped with xeroform gauze and dressed appropriately with a telfa and gauze bandage to ensure continued blood supply and protect the attached pedicle.
Melolabial Interpolation Flap Text: A decision was made to reconstruct the defect utilizing an interpolation axial flap and a staged reconstruction.  A telfa template was made of the defect.  This telfa template was then used to outline the melolabial interpolation flap.  The donor area for the pedicle flap was then injected with anesthesia.  The flap was excised through the skin and subcutaneous tissue down to the layer of the underlying musculature.  The pedicle flap was carefully excised within this deep plane to maintain its blood supply.  The edges of the donor site were undermined.   The donor site was closed in a primary fashion.  The pedicle was then rotated into position and sutured.  Once the tube was sutured into place, adequate blood supply was confirmed with blanching and refill.  The pedicle was then wrapped with xeroform gauze and dressed appropriately with a telfa and gauze bandage to ensure continued blood supply and protect the attached pedicle.
Mastoid Interpolation Flap Text: A decision was made to reconstruct the defect utilizing an interpolation axial flap and a staged reconstruction.  A telfa template was made of the defect.  This telfa template was then used to outline the mastoid interpolation flap.  The donor area for the pedicle flap was then injected with anesthesia.  The flap was excised through the skin and subcutaneous tissue down to the layer of the underlying musculature.  The pedicle flap was carefully excised within this deep plane to maintain its blood supply.  The edges of the donor site were undermined.   The donor site was closed in a primary fashion.  The pedicle was then rotated into position and sutured.  Once the tube was sutured into place, adequate blood supply was confirmed with blanching and refill.  The pedicle was then wrapped with xeroform gauze and dressed appropriately with a telfa and gauze bandage to ensure continued blood supply and protect the attached pedicle.
Posterior Auricular Interpolation Flap Text: A decision was made to reconstruct the defect utilizing an interpolation axial flap and a staged reconstruction.  A telfa template was made of the defect.  This telfa template was then used to outline the posterior auricular interpolation flap.  The donor area for the pedicle flap was then injected with anesthesia.  The flap was excised through the skin and subcutaneous tissue down to the layer of the underlying musculature.  The pedicle flap was carefully excised within this deep plane to maintain its blood supply.  The edges of the donor site were undermined.   The donor site was closed in a primary fashion.  The pedicle was then rotated into position and sutured.  Once the tube was sutured into place, adequate blood supply was confirmed with blanching and refill.  The pedicle was then wrapped with xeroform gauze and dressed appropriately with a telfa and gauze bandage to ensure continued blood supply and protect the attached pedicle.
Paramedian Forehead Flap Text: A decision was made to reconstruct the defect utilizing an interpolation axial flap and a staged reconstruction.  A telfa template was made of the defect.  This telfa template was then used to outline the paramedian forehead pedicle flap.  The donor area for the pedicle flap was then injected with anesthesia.  The flap was excised through the skin and subcutaneous tissue down to the layer of the underlying musculature.  The pedicle flap was carefully excised within this deep plane to maintain its blood supply.  The edges of the donor site were undermined.   The donor site was closed in a primary fashion.  The pedicle was then rotated into position and sutured.  Once the tube was sutured into place, adequate blood supply was confirmed with blanching and refill.  The pedicle was then wrapped with xeroform gauze and dressed appropriately with a telfa and gauze bandage to ensure continued blood supply and protect the attached pedicle.
Abbe Flap (Upper To Lower Lip) Text: The defect of the lower lip was assessed and measured.  Given the location and size of the defect, an Abbe flap was deemed most appropriate.  Using a sterile surgical marker, an appropriate Abbe flap was measured and drawn on the upper lip. Local anesthesia was then infiltrated.  A scalpel was then used to incise the upper lip through and through the skin, vermilion, muscle and mucosa, leaving the flap pedicled on the opposite side.  The flap was then rotated and transferred to the lower lip defect.  The flap was then sutured into place with a three layer technique, closing the orbicularis oris muscle layer with subcutaneous buried sutures, followed by a mucosal layer and an epidermal layer.
Abbe Flap (Lower To Upper Lip) Text: The defect of the upper lip was assessed and measured.  Given the location and size of the defect, an Abbe flap was deemed most appropriate.  Using a sterile surgical marker, an appropriate Abbe flap was measured and drawn on the lower lip. Local anesthesia was then infiltrated. A scalpel was then used to incise the upper lip through and through the skin, vermilion, muscle and mucosa, leaving the flap pedicled on the opposite side.  The flap was then rotated and transferred to the lower lip defect.  The flap was then sutured into place with a three layer technique, closing the orbicularis oris muscle layer with subcutaneous buried sutures, followed by a mucosal layer and an epidermal layer.
Estlander Flap (Upper To Lower Lip) Text: The defect of the lower lip was assessed and measured.  Given the location and size of the defect, an Estlander flap was deemed most appropriate.  Using a sterile surgical marker, an appropriate Estlander flap was measured and drawn on the upper lip. Local anesthesia was then infiltrated. A scalpel was then used to incise the lateral aspect of the flap, through skin, muscle and mucosa, leaving the flap pedicled medially.  The flap was then rotated and positioned to fill the lower lip defect.  The flap was then sutured into place with a three layer technique, closing the orbicularis oris muscle layer with subcutaneous buried sutures, followed by a mucosal layer and an epidermal layer.
Cheiloplasty (Less Than 50%) Text: A decision was made to reconstruct the defect with a  cheiloplasty.  The defect was undermined extensively.  Additional obicularis oris muscle was excised with a 15 blade scalpel.  The defect was converted into a full thickness wedge, of less than 50% of the vertical height of the lip, to facilite a better cosmetic result.  Small vessels were then tied off with 5-0 monocyrl. The obicularis oris, superficial fascia, adipose and dermis were then reapproximated.  After the deeper layers were approximated the epidermis was reapproximated with particular care given to realign the vermilion border.
Cheiloplasty (Complex) Text: A decision was made to reconstruct the defect with a  cheiloplasty.  The defect was undermined extensively.  Additional obicularis oris muscle was excised with a 15 blade scalpel.  The defect was converted into a full thickness wedge to facilite a better cosmetic result.  Small vessels were then tied off with 5-0 monocyrl. The obicularis oris, superficial fascia, adipose and dermis were then reapproximated.  After the deeper layers were approximated the epidermis was reapproximated with particular care given to realign the vermilion border.
Ear Wedge Repair Text: A wedge excision was completed by carrying down an excision through the full thickness of the ear and cartilage with an inward facing Burow's triangle. The wound was then closed in a layered fashion.
Full Thickness Lip Wedge Repair (Flap) Text: Given the location of the defect and the proximity to free margins a full thickness wedge repair was deemed most appropriate.  Using a sterile surgical marker, the appropriate repair was drawn incorporating the defect and placing the expected incisions perpendicular to the vermilion border.  The vermilion border was also meticulously outlined to ensure appropriate reapproximation during the repair.  The area thus outlined was incised through and through with a #15 scalpel blade.  The muscularis and dermis were reaproximated with deep sutures following hemostasis. Care was taken to realign the vermilion border before proceeding with the superficial closure.  Once the vermilion was realigned the superfical and mucosal closure was finished.
Ftsg Text: The defect edges were debeveled with a #15 scalpel blade.  Given the location of the defect, shape of the defect and the proximity to free margins a full thickness skin graft was deemed most appropriate.  Using a sterile surgical marker, the primary defect shape was transferred to the donor site. The area thus outlined was incised deep to adipose tissue with a #15 scalpel blade.  The harvested graft was then trimmed of adipose tissue until only dermis and epidermis was left.  The skin margins of the secondary defect were undermined to an appropriate distance in all directions utilizing iris scissors.  The secondary defect was closed with interrupted buried subcutaneous sutures.  The skin edges were then re-apposed with running  sutures.  The skin graft was then placed in the primary defect and oriented appropriately.
Split-Thickness Skin Graft Text: The defect edges were debeveled with a #15 scalpel blade.  Given the location of the defect, shape of the defect and the proximity to free margins a split thickness skin graft was deemed most appropriate.  Using a sterile surgical marker, the primary defect shape was transferred to the donor site. The split thickness graft was then harvested.  The skin graft was then placed in the primary defect and oriented appropriately.
Burow's Graft Text: The defect edges were debeveled with a #15 scalpel blade.  Given the location of the defect, shape of the defect, the proximity to free margins and the presence of a standing cone deformity a Burow's skin graft was deemed most appropriate. The standing cone was removed and this tissue was then trimmed to the shape of the primary defect. The adipose tissue was also removed until only dermis and epidermis were left.  The skin margins of the secondary defect were undermined to an appropriate distance in all directions utilizing iris scissors.  The secondary defect was closed with interrupted buried subcutaneous sutures.  The skin edges were then re-apposed with running  sutures.  The skin graft was then placed in the primary defect and oriented appropriately.
Cartilage Graft Text: The defect edges were debeveled with a #15 scalpel blade.  Given the location of the defect, shape of the defect, the fact the defect involved a full thickness cartilage defect a cartilage graft was deemed most appropriate.  An appropriate donor site was identified, cleansed, and anesthetized. The cartilage graft was then harvested and transferred to the recipient site, oriented appropriately and then sutured into place.  The secondary defect was then repaired using a primary closure.
Composite Graft Text: The defect edges were debeveled with a #15 scalpel blade.  Given the location of the defect, shape of the defect, the proximity to free margins and the fact the defect was full thickness a composite graft was deemed most appropriate.  The defect was outline and then transferred to the donor site.  A full thickness graft was then excised from the donor site. The graft was then placed in the primary defect, oriented appropriately and then sutured into place.  The secondary defect was then repaired using a primary closure.
Epidermal Autograft Text: The defect edges were debeveled with a #15 scalpel blade.  Given the location of the defect, shape of the defect and the proximity to free margins an epidermal autograft was deemed most appropriate.  Using a sterile surgical marker, the primary defect shape was transferred to the donor site. The epidermal graft was then harvested.  The skin graft was then placed in the primary defect and oriented appropriately.
Dermal Autograft Text: The defect edges were debeveled with a #15 scalpel blade.  Given the location of the defect, shape of the defect and the proximity to free margins a dermal autograft was deemed most appropriate.  Using a sterile surgical marker, the primary defect shape was transferred to the donor site. The area thus outlined was incised deep to adipose tissue with a #15 scalpel blade.  The harvested graft was then trimmed of adipose and epidermal tissue until only dermis was left.  The skin graft was then placed in the primary defect and oriented appropriately.
Skin Substitute Text: The defect edges were debeveled with a #15 scalpel blade.  Given the location of the defect, shape of the defect and the proximity to free margins a skin substitute graft was deemed most appropriate.  The graft material was trimmed to fit the size of the defect. The graft was then placed in the primary defect and oriented appropriately.
Tissue Cultured Epidermal Autograft Text: The defect edges were debeveled with a #15 scalpel blade.  Given the location of the defect, shape of the defect and the proximity to free margins a tissue cultured epidermal autograft was deemed most appropriate.  The graft was then trimmed to fit the size of the defect.  The graft was then placed in the primary defect and oriented appropriately.
Xenograft Text: The defect edges were debeveled with a #15 scalpel blade.  Given the location of the defect, shape of the defect and the proximity to free margins a xenograft was deemed most appropriate.  The graft was then trimmed to fit the size of the defect.  The graft was then placed in the primary defect and oriented appropriately.
Purse String (Simple) Text: Given the location of the defect and the characteristics of the surrounding skin a purse string closure was deemed most appropriate.  Undermining was performed circumfirentially around the surgical defect.  A purse string suture was then placed and tightened.
Purse String (Intermediate) Text: Given the location of the defect and the characteristics of the surrounding skin a purse string intermediate closure was deemed most appropriate.  Undermining was performed circumfirentially around the surgical defect.  A purse string suture was then placed and tightened.
Partial Purse String (Simple) Text: Given the location of the defect and the characteristics of the surrounding skin a simple purse string closure was deemed most appropriate.  Undermining was performed circumfirentially around the surgical defect.  A purse string suture was then placed and tightened. Wound tension only allowed a partial closure of the circular defect.
Partial Purse String (Intermediate) Text: Given the location of the defect and the characteristics of the surrounding skin an intermediate purse string closure was deemed most appropriate.  Undermining was performed circumfirentially around the surgical defect.  A purse string suture was then placed and tightened. Wound tension only allowed a partial closure of the circular defect.
Localized Dermabrasion With Wire Brush Text: The patient was draped in routine manner.  Localized dermabrasion using 3 x 17 mm wire brush was performed in routine manner to papillary dermis. This spot dermabrasion is being performed to complete skin cancer reconstruction. It also will eliminate the other sun damaged precancerous cells that are known to be part of the regional effect of a lifetime's worth of sun exposure. This localized dermabrasion is therapeutic and should not be considered cosmetic in any regard.
Tarsorrhaphy Text: A tarsorrhaphy was performed using Frost sutures.
Complex Repair And Flap Additional Text (Will Appearing After The Standard Complex Repair Text): The complex repair was not sufficient to completely close the primary defect. The remaining additional defect was repaired with the flap mentioned below.
Complex Repair And Graft Additional Text (Will Appearing After The Standard Complex Repair Text): The complex repair was not sufficient to completely close the primary defect. The remaining additional defect was repaired with the graft mentioned below.
Unique Flap 1 Name: Myocutaneous Island pedicle Flap
Unique Flap 2 Name: Peng Flap
Unique Flap 3 Name: Mercedes Flap
Unique Flap 4 Name: Banner Flap
Unique Flap 5 Name: tunneled myocutaneous flap
Unique Flap 6 Name: Danuta-B?ch flap
Unique Flap 7 Name: Mustarde flap
Unique Flap 8 Name: East to West Flap
Unique Flap 1 Text: A decision was made to reconstruct the defect utilizing a myocutaneous Island pedicle Flap based on the levator labii superioris muscle.  A telfa template was made of the defect.  This telfa template was then used to outline the myocutaneous flap, based along the meilolabial fold.  The donor area for the pedicle flap was then injected with anesthesia.  The flap was excised through the skin and subcutaneous tissue down to the layer of the underlying musculature.  The myocutaneous flap was carefully excised within this deep plane to maintain its blood supply. Based on the muscle. The edges of the donor site were undermined.   The donor site was closed in a primary fashion to the point of transposition.  The pedicle was then transposed into position and sutured.  Once the flap was sutured into place, adequate blood supply was confirmed with blanching and refill.
Unique Flap 2 Text: A decision was made to reconstruct the defect utilizing a Peng Flap (Bilateral Advancement Rotation Flap). Given the location of the defect and the proximity to free margins, this flap was deemed most appropriate.  Using a sterile surgical marker, the appropriate rotation flaps were drawn incorporating the defect and placing the expected incisions within the relaxed skin tension lines where possible.    The area thus outlined was incised deep to adipose tissue with a #15 scalpel blade.  The skin margins were undermined to an appropriate distance in all directions utilizing iris scissors.
Unique Flap 3 Text: The defect edges were debeveled with a #15 scalpel blade.  Given the location of the defect, shape of the defect and the proximity to free margins a Mercedes (double advancement flap) was deemed most appropriate.  Using a sterile surgical marker, the appropriate transposition flaps were drawn incorporating the defect and placing the expected incisions within the relaxed skin tension lines where possible.    The area thus outlined was incised deep to adipose tissue with a #15 scalpel blade.  The skin margins were undermined to an appropriate distance in all directions utilizing iris scissors.  Hemostasis was achieved with electrocautery.  The flaps were then advanced into the defect and anchored with interrupted buried subcutaneous sutures.
Unique Flap 4 Text: The defect edges were debeveled with a #15 scalpel blade.  Given the location of the defect and the proximity to free margins a Banner transposition flap was deemed most appropriate.  Using a sterile surgical marker, an appropriate Banner transposition flap was drawn incorporating the defect.    The area thus outlined was incised deep to adipose tissue with a #15 scalpel blade.  The skin margins were undermined to an appropriate distance in all directions utilizing iris scissors.
Unique Flap 5 Text: A decision was made to reconstruct the defect utilizing a tunneled myocutaneous Island pedicle Flap based on the anterior auricularis muscle.  A telfa template was made of the defect.  This telfa template was then used to outline the myocutaneous flap, based along the preauricular fold.  The donor area for the pedicle flap was then injected with anesthesia.  The flap was excised through the skin and subcutaneous tissue down to the layer of the underlying musculature.  The myocutaneous flap was carefully excised within this deep plane to maintain its blood supply based on the muscle. The edges of the donor site were undermined.   The donor site was closed in a primary fashion to the point of transposition.  The pedicle was then transposed through a tunnel into position and sutured.  Once the flap was sutured into place, adequate blood supply was confirmed with blanching and refill.
Unique Flap 6 Text: A decision was made to reconstruct the defect utilizing an Anti-aging-B?ch Flap (Bilateral helical Advancement Rotation Flap). Given the location of the defect and the proximity to free margins, this flap was deemed most appropriate.  Using a sterile surgical marker, the appropriate flaps were drawn incorporating the defect and placing the expected incisions within the relaxed skin tension lines where possible.  The area thus outlined was incised deep to adipose tissue with a #15 scalpel blade.  The skin margins were undermined to an appropriate distance in all directions utilizing iris scissors. Cartilage was incorporated into the flap arms to maintain helical anatomy.
Unique Flap 7 Text: A decision was made to reconstruct the defect utilizing a Mustarde Flap (Advancement Rotation Flap). Given the location of the defect and the proximity to free margins, this flap was deemed most appropriate.  Using a sterile surgical marker, the appropriate rotation flap was drawn incorporating the defect and placing the expected incisions within the relaxed skin tension lines where possible.    The area thus outlined was incised deep to adipose tissue with a #15 scalpel blade.  The skin margins were undermined to an appropriate distance in all directions utilizing iris scissors. The flap was advanced and rotated under the eyelid with a sling created laterally to keep ectropion minimal.
Unique Flap 8 Text: A decision was made to reconstruct the defect utilizing an East to West Flap (Modified Burows Advancement Flap). Given the location of the defect and the proximity to free margins, this flap was deemed most appropriate.  Using a sterile surgical marker, the appropriate advancement flaps were drawn incorporating the defect and placing the expected incisions within the relaxed skin tension lines where possible.    The area thus outlined was incised deep to adipose tissue with a #15 scalpel blade.  The skin margins were undermined to an appropriate distance in all directions utilizing iris scissors. Minimal alar distortion was created with flap approximation.
Manual Repair Warning Statement: We plan on removing the manually selected variable below in favor of our much easier automatic structured text blocks found in the previous tab. We decided to do this to help make the flow better and give you the full power of structured data. Manual selection is never going to be ideal in our platform and I would encourage you to avoid using manual selection from this point on, especially since I will be sunsetting this feature. It is important that you do one of two things with the customized text below. First, you can save all of the text in a word file so you can have it for future reference. Second, transfer the text to the appropriate area in the Library tab. Lastly, if there is a flap or graft type which we do not have you need to let us know right away so I can add it in before the variable is hidden. No need to panic, we plan to give you roughly 6 months to make the change.
Same Histology In Subsequent Stages Text: The pattern and morphology of the tumor is as described in the first stage.
No Residual Tumor Seen Histology Text: There were no malignant cells seen in the sections examined.
Inflammation Suggestive Of Cancer Camouflage Histology Text: There was a dense lymphocytic infiltrate which prevented adequate histologic evaluation of adjacent structures.
Bcc Histology Text: There were numerous aggregates of basaloid cells.
Bcc Infiltrative Histology Text: There were numerous aggregates of basaloid cells demonstrating an infiltrative pattern.
Mart-1 - Positive Histology Text: MART-1 staining demonstrates areas of higher density and clustering of melanocytes with Pagetoid spread upwards within the epidermis. The surgical margins are positive for tumor cells.
Mart-1 - Negative Histology Text: MART-1 staining demonstrates a normal density and pattern of melanocytes along the dermal-epidermal junction. The surgical margins are negative for tumor cells.
Information: Selecting Yes will display possible errors in your note based on the variables you have selected. This validation is only offered as a suggestion for you. PLEASE NOTE THAT THE VALIDATION TEXT WILL BE REMOVED WHEN YOU FINALIZE YOUR NOTE. IF YOU WANT TO FAX A PRELIMINARY NOTE YOU WILL NEED TO TOGGLE THIS TO 'NO' IF YOU DO NOT WANT IT IN YOUR FAXED NOTE.

## 2022-06-19 ASSESSMENT — ENCOUNTER SYMPTOMS
SHORTNESS OF BREATH: 0
RESPIRATORY SYMPTOMS COMMENTS: NO
WHEEZING: 0
HEMOPTYSIS: 0
DYSPNEA AT REST: 0
CHEST TIGHTNESS: 0

## 2022-06-20 ENCOUNTER — APPOINTMENT (RX ONLY)
Dept: URBAN - METROPOLITAN AREA CLINIC 6 | Facility: CLINIC | Age: 72
Setting detail: DERMATOLOGY
End: 2022-06-20

## 2022-06-20 DIAGNOSIS — K6811 OTHER POSTOPERATIVE INFECTION: ICD-10-CM

## 2022-06-20 DIAGNOSIS — T814XXA OTHER POSTOPERATIVE INFECTION: ICD-10-CM

## 2022-06-20 PROBLEM — C44.712 BASAL CELL CARCINOMA OF SKIN OF RIGHT LOWER LIMB, INCLUDING HIP: Status: ACTIVE | Noted: 2022-06-20

## 2022-06-20 PROBLEM — C44.719 BASAL CELL CARCINOMA OF SKIN OF LEFT LOWER LIMB, INCLUDING HIP: Status: ACTIVE | Noted: 2022-06-20

## 2022-06-20 PROBLEM — T81.40XA INFECTION FOLLOWING A PROCEDURE, UNSPECIFIED, INITIAL ENCOUNTER: Status: ACTIVE | Noted: 2022-06-20

## 2022-06-20 PROCEDURE — ? COUNSELING

## 2022-06-20 PROCEDURE — 99212 OFFICE O/P EST SF 10 MIN: CPT | Mod: 24,25

## 2022-06-20 PROCEDURE — 17263 DSTRJ MAL LES T/A/L 2.1-3.0: CPT | Mod: 79

## 2022-06-20 PROCEDURE — 17262 DSTRJ MAL LES T/A/L 1.1-2.0: CPT | Mod: 79

## 2022-06-20 PROCEDURE — ? PRESCRIPTION

## 2022-06-20 PROCEDURE — ? CURETTAGE AND DESTRUCTION

## 2022-06-20 PROCEDURE — ? PRESCRIPTION MEDICATION MANAGEMENT

## 2022-06-20 RX ORDER — MUPIROCIN 20 MG/G
OINTMENT TOPICAL
Qty: 44 | Refills: 3 | Status: ERX | COMMUNITY
Start: 2022-06-20

## 2022-06-20 RX ORDER — DOXYCYCLINE 100 MG/1
CAPSULE ORAL
Qty: 20 | Refills: 0 | Status: ERX | COMMUNITY
Start: 2022-06-20

## 2022-06-20 RX ADMIN — DOXYCYCLINE: 100 CAPSULE ORAL at 00:00

## 2022-06-20 RX ADMIN — MUPIROCIN: 20 OINTMENT TOPICAL at 00:00

## 2022-06-20 ASSESSMENT — LOCATION SIMPLE DESCRIPTION DERM: LOCATION SIMPLE: RIGHT PRETIBIAL REGION

## 2022-06-20 ASSESSMENT — LOCATION ZONE DERM: LOCATION ZONE: LEG

## 2022-06-20 ASSESSMENT — LOCATION DETAILED DESCRIPTION DERM: LOCATION DETAILED: RIGHT PROXIMAL PRETIBIAL REGION

## 2022-06-20 NOTE — PROCEDURE: PRESCRIPTION MEDICATION MANAGEMENT
Initiate Treatment: Mupirocin 2% ointment BID until resolved and doxycycline 100mg BID X 10 days.
Detail Level: Zone
Render In Strict Bullet Format?: No

## 2022-06-20 NOTE — PROCEDURE: CURETTAGE AND DESTRUCTION
Detail Level: Detailed
Biopsy Photograph Reviewed: Yes
Accession # (Optional): [id number]
Number Of Curettages: 3
Size Of Lesion In Cm: 2.2
Add Intralesional Injection: No
Total Volume (Ccs): 1
Anesthesia Type: 1% lidocaine with epinephrine
Cautery Type: electrodesiccation
What Was Performed First?: Curettage
Final Size Statement: The size of the lesion after treatment was
Additional Information: (Optional): The wound was cleaned, and a pressure dressing was applied.  The patient received detailed post-op instructions.
Consent: Written consent was obtained from the patient. The risks, benefits and alternatives to therapy were discussed in detail. Specifically, the risks of infection, scarring, bleeding, prolonged wound healing, nerve injury, incomplete removal, allergy to anesthesia and recurrence were addressed. Alternatives to ED&C, such as: surgical removal and XRT were also discussed.  Prior to the procedure, the treatment site was clearly identified and confirmed by the patient. All components of Universal Protocol/PAUSE Rule completed.
Post-Care Instructions: I reviewed with the patient in detail post-care instructions. Patient is to keep the area dry for 24-48 hours, and not to engage in any swimming until the area is healed. Should the patient develop any fevers, chills, bleeding, severe pain patient will contact the office immediately.
Bill As A Line Item Or As Units: Line Item
Number Of Curettages: 2
Size Of Lesion In Cm: 0.8
Size Of Lesion After Curettage: 1.6

## 2022-06-21 ENCOUNTER — OFFICE VISIT (OUTPATIENT)
Dept: SLEEP MEDICINE | Facility: MEDICAL CENTER | Age: 72
End: 2022-06-21
Payer: COMMERCIAL

## 2022-06-21 ENCOUNTER — APPOINTMENT (RX ONLY)
Dept: URBAN - METROPOLITAN AREA CLINIC 36 | Facility: CLINIC | Age: 72
Setting detail: DERMATOLOGY
End: 2022-06-21

## 2022-06-21 VITALS
SYSTOLIC BLOOD PRESSURE: 124 MMHG | RESPIRATION RATE: 16 BRPM | WEIGHT: 177 LBS | HEART RATE: 61 BPM | HEIGHT: 72 IN | BODY MASS INDEX: 23.98 KG/M2 | OXYGEN SATURATION: 95 % | DIASTOLIC BLOOD PRESSURE: 62 MMHG

## 2022-06-21 DIAGNOSIS — Z48.02 ENCOUNTER FOR REMOVAL OF SUTURES: ICD-10-CM

## 2022-06-21 DIAGNOSIS — R93.89 ABNORMAL CT OF THE CHEST: ICD-10-CM

## 2022-06-21 DIAGNOSIS — Z72.0 TOBACCO USE: ICD-10-CM

## 2022-06-21 DIAGNOSIS — J43.9 PULMONARY EMPHYSEMA, UNSPECIFIED EMPHYSEMA TYPE (HCC): ICD-10-CM

## 2022-06-21 DIAGNOSIS — J18.9 PNEUMONIA OF RIGHT UPPER LOBE DUE TO INFECTIOUS ORGANISM: ICD-10-CM

## 2022-06-21 DIAGNOSIS — Z48.817 ENCOUNTER FOR SURGICAL AFTERCARE FOLLOWING SURGERY ON THE SKIN AND SUBCUTANEOUS TISSUE: ICD-10-CM

## 2022-06-21 PROCEDURE — 99204 OFFICE O/P NEW MOD 45 MIN: CPT | Performed by: INTERNAL MEDICINE

## 2022-06-21 PROCEDURE — ? POST-OP WOUND CHECK

## 2022-06-21 PROCEDURE — ? SUTURE REMOVAL (GLOBAL PERIOD)

## 2022-06-21 RX ORDER — TIOTROPIUM BROMIDE INHALATION SPRAY 3.12 UG/1
5 SPRAY, METERED RESPIRATORY (INHALATION) DAILY
Qty: 2 EACH | Refills: 0 | COMMUNITY
Start: 2022-06-21 | End: 2022-08-26

## 2022-06-21 ASSESSMENT — LOCATION ZONE DERM
LOCATION ZONE: LEG
LOCATION ZONE: SCALP

## 2022-06-21 ASSESSMENT — LOCATION SIMPLE DESCRIPTION DERM
LOCATION SIMPLE: LEFT CALF
LOCATION SIMPLE: SCALP

## 2022-06-21 ASSESSMENT — ENCOUNTER SYMPTOMS
ABDOMINAL PAIN: 0
SHORTNESS OF BREATH: 1
CONSTIPATION: 0
DIAPHORESIS: 0
EYE PAIN: 0
BLURRED VISION: 0
EYE REDNESS: 0
WEAKNESS: 0
SINUS PAIN: 0
DIARRHEA: 0
BACK PAIN: 0
DOUBLE VISION: 0
STRIDOR: 0
NECK PAIN: 0
DEPRESSION: 0
COUGH: 1
PHOTOPHOBIA: 0
SORE THROAT: 0
SPUTUM PRODUCTION: 0
FALLS: 0
PALPITATIONS: 0
TREMORS: 0
PND: 0
NAUSEA: 0
WHEEZING: 0
FEVER: 0
CLAUDICATION: 0
ORTHOPNEA: 0
HEADACHES: 0
SPEECH CHANGE: 0
FOCAL WEAKNESS: 0
EYE DISCHARGE: 0
CHILLS: 0
HEMOPTYSIS: 0
VOMITING: 0
DIZZINESS: 0
HEARTBURN: 0
WEIGHT LOSS: 0
MYALGIAS: 0

## 2022-06-21 ASSESSMENT — LOCATION DETAILED DESCRIPTION DERM
LOCATION DETAILED: LEFT DISTAL CALF
LOCATION DETAILED: LEFT CENTRAL POSTAURICULAR SKIN

## 2022-06-21 ASSESSMENT — FIBROSIS 4 INDEX: FIB4 SCORE: 1.21

## 2022-06-21 NOTE — PROGRESS NOTES
Chief Complaint   Patient presents with   • Establish Care     Referral SENDY Booker MD DX Pulmonary fibrosis, unspecified   • Results     HRCT 12/13/21        HPI: This patient is a 72 y.o. male presenting for evaluation of abnormal CT chest imaging.  Past medical history significant for coronary artery disease status post CABG roughly 9 years ago which was complicated by angioedema likely secondary to ACE inhibitor.  He is a current tobacco smoker with greater than 40-pack-year history currently smoking 1/2 pack/day.  He works for [employer] in the [employer] [place] but is retired from a career as a manager for [employer].  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  No family history of pulmonary or autoimmune disease.  The patient exercises on a regular basis but notes increasing shortness of breath with decreased exercise tolerance over the past year in addition to chronic cough which occurs mainly at night and first thing in the morning.  Once he clears phlegm that is often yellow to brown he does not have persistent cough throughout the day.  He has never had pulmonary function testing.  In November patient became ill with myalgias and cough and generalized malaise.  Chest x-ray on 11/7 showed dense right upper lobe pneumonia.  He was treated with antibiotics and chest x-ray 1 week later on 11/14 showed improvement but not clearance of infiltrate.  HRCT was ordered for December 13, 5 weeks from his initial diagnosis of pneumonia due to persistent cough.  This showed emphysematous changes in the bilateral upper lobes in addition to what was described by radiology as fibrotic and cystic process in the right upper lobe but per my review and history looks more consistent with resolving right upper lobe pneumonia.  He does not have subpleural reticulation or honeycombing typical of UIP.  Overall patient symptoms have improved significantly since that time although he does have a  chronic cough as per above.  He has never been treated with any bronchodilators for COPD.  He can still do an hour of cardio daily but as per above intensity of exercise is reduced compared to a year ago.    Past Medical History:   Diagnosis Date   • Arrhythmia     REMOTE HISTORY IN HS   • ASTHMA     REMOTE HISTORY   • History of myocardial infarction    • HTN (hypertension) 12/23/2013   • Myocardial infarct (HCC)     CURRENT   • S/P coronary artery bypass graft x 5        Social History     Socioeconomic History   • Marital status:      Spouse name: Kaylee   • Number of children: Not on file   • Years of education: Not on file   • Highest education level: Not on file   Occupational History     Employer: [employer]   Tobacco Use   • Smoking status: Current Some Day Smoker     Packs/day: 0.50     Types: Cigarettes   • Smokeless tobacco: Never Used   Vaping Use   • Vaping Use: Never used   Substance and Sexual Activity   • Alcohol use: Yes     Comment: SOCIAL   • Drug use: No   • Sexual activity: Not on file   Other Topics Concern   • Not on file   Social History Narrative   • Not on file     Social Determinants of Health     Financial Resource Strain: Not on file   Food Insecurity: Not on file   Transportation Needs: Not on file   Physical Activity: Not on file   Stress: Not on file   Social Connections: Not on file   Intimate Partner Violence: Not on file   Housing Stability: Not on file       No family history on file.    Current Outpatient Medications on File Prior to Visit   Medication Sig Dispense Refill   • rosuvastatin (CRESTOR) 10 MG Tab Take 1 Tablet by mouth every day. Please call [phone] to schedule a follow up with a new provider. 90 Tablet 0   • Diclofenac Sodium 1.5 % Solution      • HYDROcodone-acetaminophen (NORCO) 5-325 MG Tab per tablet      • aspirin EC 81 MG EC tablet Take 1 Tab by mouth every day. 30 Tab 3     No current facility-administered medications on file prior to visit.        Allergies: Patient has no known allergies.    ROS:   Review of Systems   Constitutional: Negative for chills, diaphoresis, fever, malaise/fatigue and weight loss.   HENT: Negative for congestion, ear discharge, ear pain, hearing loss, nosebleeds, sinus pain, sore throat and tinnitus.    Eyes: Negative for blurred vision, double vision, photophobia, pain, discharge and redness.   Respiratory: Positive for cough and shortness of breath. Negative for hemoptysis, sputum production, wheezing and stridor.    Cardiovascular: Negative for chest pain, palpitations, orthopnea, claudication, leg swelling and PND.   Gastrointestinal: Negative for abdominal pain, constipation, diarrhea, heartburn, nausea and vomiting.   Genitourinary: Negative for dysuria and urgency.   Musculoskeletal: Negative for back pain, falls, joint pain, myalgias and neck pain.   Skin: Negative for itching and rash.   Neurological: Negative for dizziness, tremors, speech change, focal weakness, weakness and headaches.   Endo/Heme/Allergies: Negative for environmental allergies.   Psychiatric/Behavioral: Negative for depression.       /62 (BP Location: Right arm, Patient Position: Sitting, BP Cuff Size: Adult)   Pulse 61   Resp 16   Ht 1.829 m (6')   Wt 80.3 kg (177 lb)   SpO2 95%     Physical Exam:  Physical Exam  Vitals reviewed.   Constitutional:       General: He is not in acute distress.     Appearance: Normal appearance. He is normal weight.   HENT:      Head: Normocephalic and atraumatic.      Right Ear: External ear normal.      Left Ear: External ear normal.      Nose: Nose normal. No congestion.      Mouth/Throat:      Mouth: Mucous membranes are moist.      Pharynx: Oropharynx is clear. No oropharyngeal exudate.   Eyes:      General: No scleral icterus.     Extraocular Movements: Extraocular movements intact.      Conjunctiva/sclera: Conjunctivae normal.      Pupils: Pupils are equal, round, and reactive to light.    Cardiovascular:      Rate and Rhythm: Normal rate and regular rhythm.      Heart sounds: Normal heart sounds. No murmur heard.  Pulmonary:      Effort: Pulmonary effort is normal. No respiratory distress.      Breath sounds: Normal breath sounds. No wheezing or rales.   Abdominal:      General: There is no distension.      Palpations: Abdomen is soft.   Musculoskeletal:         General: Normal range of motion.      Cervical back: Normal range of motion and neck supple.      Right lower leg: No edema.      Left lower leg: No edema.   Skin:     General: Skin is warm and dry.      Findings: No rash.   Neurological:      Mental Status: He is alert and oriented to person, place, and time.      Cranial Nerves: No cranial nerve deficit.   Psychiatric:         Mood and Affect: Mood normal.         Behavior: Behavior normal.         PFTs as reviewed by me personally: None    Imaging as reviewed by me personally: As per HPI    Assessment:  1. Pulmonary emphysema, unspecified emphysema type (HCC)  PULMONARY FUNCTION TESTS -Test requested: Complete Pulmonary Function Test    CT-CHEST, HIGH RESOLUTION LUNG    tiotropium (SPIRIVA RESPIMAT) 2.5 mcg/Act Aero Soln   2. Abnormal CT of the chest  CT-CHEST, HIGH RESOLUTION LUNG   3. Pneumonia of right upper lobe due to infectious organism  CT-CHEST, HIGH RESOLUTION LUNG   4. Tobacco use         Plan:  1.  Patient has emphysematous changes bilaterally and his CT is more consistent with infiltrate surrounding emphysematous areas of the lung, not typical of pulmonary fibrosis.  Also, he has no crackles on exam today.  We will plan to obtain baseline pulmonary function testing as I suspect his chronic cough and decrease in exercise tolerance is related to chronic obstructive lung disease and not fibrotic lung disease.  We will proceed with a trial of Spiriva and follow-up CT to ensure clearance of what I suspect is an infiltrate and emphysematous lung as per above.  2.  See discussion  above.  CT findings are not consistent with UIP pattern therefore I have low suspicion for fibrotic lung disease.  3.  Patient's CT chest was done less than 8 weeks after his initial pneumonia which was fairly significant on imaging therefore timeline is most consistent with resolving right upper lobe pneumonia.  Repeat CT ordered to document clearance.  4.  Patient was counseled on tobacco cessation.  He is a candidate for lung cancer screening and I would recommend referral to lung cancer screening program 1 year from CT which we are ordering now.  Return in about 4 months (around 10/21/2022) for HRCT chest, PFTs at patient convenience.

## 2022-06-21 NOTE — PROCEDURE: POST-OP WOUND CHECK
Detail Level: Detailed
Add 70119 Cpt? (Important Note: In 2017 The Use Of 98998 Is Being Tracked By Cms To Determine Future Global Period Reimbursement For Global Periods): no
Wound Evaluated By: Loretta Calderon RN

## 2022-06-21 NOTE — PROCEDURE: SUTURE REMOVAL (GLOBAL PERIOD)
Detail Level: Detailed
Add 39435 Cpt? (Important Note: In 2017 The Use Of 29138 Is Being Tracked By Cms To Determine Future Global Period Reimbursement For Global Periods): no

## 2022-08-23 ENCOUNTER — NON-PROVIDER VISIT (OUTPATIENT)
Dept: SLEEP MEDICINE | Facility: MEDICAL CENTER | Age: 72
End: 2022-08-23
Attending: INTERNAL MEDICINE
Payer: COMMERCIAL

## 2022-08-23 VITALS — WEIGHT: 176 LBS | HEIGHT: 73 IN | BODY MASS INDEX: 23.33 KG/M2

## 2022-08-23 DIAGNOSIS — J43.9 PULMONARY EMPHYSEMA, UNSPECIFIED EMPHYSEMA TYPE (HCC): ICD-10-CM

## 2022-08-23 PROCEDURE — 94060 EVALUATION OF WHEEZING: CPT | Performed by: INTERNAL MEDICINE

## 2022-08-23 PROCEDURE — 94729 DIFFUSING CAPACITY: CPT | Performed by: INTERNAL MEDICINE

## 2022-08-23 PROCEDURE — 94726 PLETHYSMOGRAPHY LUNG VOLUMES: CPT | Performed by: INTERNAL MEDICINE

## 2022-08-23 ASSESSMENT — PULMONARY FUNCTION TESTS
FEV1/FVC_PERCENT_PREDICTED: 77
FVC_PERCENT_PREDICTED: 95
FVC_PREDICTED: 4.66
FEV1/FVC: 54
FVC_PERCENT_PREDICTED: 95
FEV1: 2.39
FVC: 4.47
FEV1/FVC_PERCENT_CHANGE: 6
FEV1/FVC: 54
FEV1_PREDICTED: 3.49
FEV1_PERCENT_PREDICTED: 73
FEV1/FVC_PERCENT_PREDICTED: 72
FEV1_PERCENT_PREDICTED: 68
FEV1: 2.57
FEV1_PERCENT_CHANGE: 0
FEV1/FVC_PREDICTED: 75
FEV1_PERCENT_CHANGE: 7
FEV1/FVC_PERCENT_PREDICTED: 71
FEV1/FVC: 57.49
FEV1_LLN: 2.91
FVC: 4.43
FEV1/FVC_PERCENT_LLN: 63
FEV1/FVC_PERCENT_PREDICTED: 75
FEV1/FVC: 57
FVC_LLN: 3.89
FEV1/FVC_PERCENT_PREDICTED: 76

## 2022-08-23 ASSESSMENT — FIBROSIS 4 INDEX: FIB4 SCORE: 1.21

## 2022-08-23 NOTE — PROCEDURES
Technician: RIGOBERTO [place]    Technician Comment:  Good patient effort & cooperation.  The results of this test meet the ATS/ERS standards for acceptability & reproducibility.  Test was performed on the [employer] Body Plethysmograph-Elite DX system.  Predicted values were GLI-2012 for spirometry, GLI-2017 for DLCO, ITS for Lung Volumes.  The DLCO was uncorrected for Hgb.  A bronchodilator of Ventolin HFA -2puffs via spacer administered.  DLCO performed during dilation period.    Pulmonary function test  Ordering physician: Iris Quintero  Reviewing physician: Fawn Lerma  Reason for study: Emphysema    Results:  Spirometry:  FVC is 4.43 which is 95% predicted without a significant change postbronchodilator  FEV1 is 2.39 which is 68% predicted without a significant change postbronchodilator  FEV1/FVC is 57    Lung volumes:  TLC is 7.38 which is 96% predicted  RV is 2.84 which is 107% predicted    Diffusion capacity:  DLCO is 19.44 which is 69% predicted  DL/VA is 3 which is 86% predicted    Interpretation:  1.  Spirometry is significant for a moderate obstruction  2.  There is no significant change postbronchodilator  3.  The flow volume loop indicates a much more severe obstruction than indicated on spirometry  4.  Lung volumes are normal  5.  Diffusion capacity is mildly decreased with partial correction for alveolar volume.  6.  In comparison to the prior study from 5/22/2018 the DLCO is decreased from 106% to 69% predicted    Fawn Lerma MD RD  Pulmonary and Critical Care    Available on Voalte

## 2022-08-26 ENCOUNTER — OFFICE VISIT (OUTPATIENT)
Dept: CARDIOLOGY | Facility: MEDICAL CENTER | Age: 72
End: 2022-08-26
Payer: COMMERCIAL

## 2022-08-26 VITALS
OXYGEN SATURATION: 96 % | DIASTOLIC BLOOD PRESSURE: 78 MMHG | RESPIRATION RATE: 14 BRPM | SYSTOLIC BLOOD PRESSURE: 110 MMHG | HEIGHT: 72 IN | WEIGHT: 178 LBS | HEART RATE: 56 BPM | BODY MASS INDEX: 24.11 KG/M2

## 2022-08-26 DIAGNOSIS — I25.10 CORONARY ARTERY DISEASE INVOLVING NATIVE CORONARY ARTERY OF NATIVE HEART WITHOUT ANGINA PECTORIS: ICD-10-CM

## 2022-08-26 DIAGNOSIS — Z95.1 S/P CORONARY ARTERY BYPASS GRAFT X 5: ICD-10-CM

## 2022-08-26 DIAGNOSIS — I10 PRIMARY HYPERTENSION: ICD-10-CM

## 2022-08-26 DIAGNOSIS — I25.2 HISTORY OF MYOCARDIAL INFARCTION: ICD-10-CM

## 2022-08-26 DIAGNOSIS — E78.00 PURE HYPERCHOLESTEROLEMIA: ICD-10-CM

## 2022-08-26 LAB — EKG IMPRESSION: NORMAL

## 2022-08-26 PROCEDURE — 99214 OFFICE O/P EST MOD 30 MIN: CPT | Performed by: INTERNAL MEDICINE

## 2022-08-26 PROCEDURE — 93000 ELECTROCARDIOGRAM COMPLETE: CPT | Performed by: INTERNAL MEDICINE

## 2022-08-26 ASSESSMENT — ENCOUNTER SYMPTOMS
PALPITATIONS: 0
DIZZINESS: 0
SHORTNESS OF BREATH: 0
COUGH: 0
MYALGIAS: 0
LOSS OF CONSCIOUSNESS: 0

## 2022-08-26 ASSESSMENT — FIBROSIS 4 INDEX: FIB4 SCORE: 1.21

## 2022-08-26 NOTE — PROGRESS NOTES
Chief Complaint   Patient presents with    Follow-Up     F/V Dx: Annual FV       Subjective     Hugo Gomez is a 72 y.o. male who presents today for follow-up cardiac care.    The patient has significant past medical history of CAD, MI, CABG 5-vessel (LIMA-LAD, sequential SVG to D1, OM1, OM2) 12/7/2013, dyslipidemia, HTN, tobacco use, COPD, lumbar disc disease with sciatica and peripheral neuropathy.    Previously followed by Olaf Carter MD, now retired last seen 6/1/2021.  In the interim the patient reports exercises regularly but increasing HOSKINS and decreased exercise tolerance, saw Pulmonary, Iris Quintero, had recent PFTs  with moderate obstructive disease and reduced DLCO, previous abnormal HRCT scan 12/2021 with emphysema. Still smokes. 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    Past Medical History:   Diagnosis Date    Arrhythmia     REMOTE HISTORY IN     ASTHMA     REMOTE HISTORY    History of myocardial infarction     HTN (hypertension) 12/23/2013    Myocardial infarct (HCC)     CURRENT    S/P coronary artery bypass graft x 5      Past Surgical History:   Procedure Laterality Date    MULTIPLE CORONARY ARTERY BYPASS ENDO VEIN HARVEST  12/7/2013    Performed by Mo Vincent M.D. at [facility]    CHOLECYSTECTOMY       Family History   Problem Relation Age of Onset    Heart Disease Neg Hx      Social History     Socioeconomic History    Marital status:      Spouse name: Kaylee    Number of children: Not on file    Years of education: Not on file    Highest education level: Not on file   Occupational History     Employer: [employer]   Tobacco Use    Smoking status: Some Days     Packs/day: 0.50     Types: Cigarettes    Smokeless tobacco: Never   Vaping Use     Vaping Use: Never used   Substance and Sexual Activity    Alcohol use: Yes     Comment: SOCIAL    Drug use: No    Sexual activity: Not on file   Other Topics Concern    Not on file   Social History Narrative    Not on file     Social Determinants of Health     Financial Resource Strain: Not on file   Food Insecurity: Not on file   Transportation Needs: Not on file   Physical Activity: Not on file   Stress: Not on file   Social Connections: Not on file   Intimate Partner Violence: Not on file   Housing Stability: Not on file     No Known Allergies  Outpatient Encounter Medications as of 8/26/2022   Medication Sig Dispense Refill    rosuvastatin (CRESTOR) 10 MG Tab Take 1 Tablet by mouth every day. Please call [phone] to schedule a follow up with a new provider. 90 Tablet 0    Diclofenac Sodium 1.5 % Solution       HYDROcodone-acetaminophen (NORCO) 5-325 MG Tab per tablet       aspirin EC 81 MG EC tablet Take 1 Tab by mouth every day. 30 Tab 3    [DISCONTINUED] tiotropium (SPIRIVA RESPIMAT) 2.5 mcg/Act Aero Soln Inhale 2 Inhalation every day. Assemble and prime. 2 Each 0     No facility-administered encounter medications on file as of 8/26/2022.     Review of Systems   Respiratory:  Negative for cough and shortness of breath.    Cardiovascular:  Negative for chest pain and palpitations.   Musculoskeletal:  Negative for myalgias.   Neurological:  Negative for dizziness and loss of consciousness.            Objective     /78 (BP Location: Left arm, Patient Position: Sitting, BP Cuff Size: Adult)   Pulse (!) 56   Resp 14   Ht 1.829 m (6')   Wt 80.7 kg (178 lb)   SpO2 96%   BMI 24.14 kg/m²   BP Readings from Last 5 Encounters:   08/26/22 110/78   06/21/22 124/62   11/07/21 110/68   06/01/21 112/62   06/16/20 120/76      Pulse Readings from Last 5 Encounters:   08/26/22 (!) 56   06/21/22 61   11/07/21 (!) 56   06/01/21 61   06/16/20 60      Wt Readings from Last 5 Encounters:   08/26/22 80.7 kg (178 lb)    08/23/22 79.8 kg (176 lb)   06/21/22 80.3 kg (177 lb)   11/07/21 83.9 kg (185 lb)   06/01/21 83.5 kg (184 lb)       Physical Exam  Vitals reviewed.   Constitutional:       General: He is not in acute distress.     Appearance: He is well-developed.   Eyes:      Conjunctiva/sclera: Conjunctivae normal.      Pupils: Pupils are equal, round, and reactive to light.   Neck:      Vascular: No JVD.   Cardiovascular:      Rate and Rhythm: Normal rate and regular rhythm.      Pulses:           Carotid pulses are 2+ on the right side and 2+ on the left side.     Heart sounds: Normal heart sounds. No murmur heard.    No friction rub. No gallop.      Comments: Midline scar  Pulmonary:      Effort: Pulmonary effort is normal. No accessory muscle usage or respiratory distress.      Breath sounds: Normal breath sounds. No wheezing or rales.   Abdominal:      General: There is no distension.      Palpations: Abdomen is soft. There is no mass.      Tenderness: There is no abdominal tenderness.   Musculoskeletal:      Cervical back: Normal range of motion and neck supple.      Right lower leg: No edema.      Left lower leg: No edema.   Skin:     General: Skin is warm and dry.      Findings: No rash.      Nails: There is no clubbing.   Neurological:      Mental Status: He is alert and oriented to person, place, and time.   Psychiatric:         Behavior: Behavior normal.       PFTs 8/23/2022  1.  Spirometry is significant for a moderate obstruction  2.  There is no significant change postbronchodilator  3.  The flow volume loop indicates a much more severe obstruction than indicated on spirometry  4.  Lung volumes are normal  5.  Diffusion capacity is mildly decreased with partial correction for alveolar volume.  6.  In comparison to the prior study from 5/22/2018 the DLCO is decreased from 106% to 69% predicted    ECHOCARDIOGRAM 3/24/2014  Normal left ventricular systolic function.   Left ventricular ejection fraction is 55% to 60%.    Septal dyskinesis.   Mild to moderate mitral regurgitation.   Moderate tricuspid regurgitation.   Mild pulmonic insufficiency.     EKG 8/26/2022 sinus rhythm, rate 54, left anterior fascicular block, anterior T wave inversions, personally interpreted    Assessment & Plan     1. Coronary artery disease involving native coronary artery of native heart without angina pectoris  EC-ECHOCARDIOGRAM REST/STRESS W/O CONT      2. S/P coronary artery bypass graft x 5        3. Primary hypertension  EKG    EC-ECHOCARDIOGRAM COMPLETE W/O CONT    EC-ECHOCARDIOGRAM REST/STRESS W/O CONT      4. History of myocardial infarction        5. Pure hypercholesterolemia  LIPID PANEL    Comp Metabolic Panel          Medical Decision Making: Today's Assessment/Status/Plan: Assessment  1.  CAD.  2.  S/P CABG 5-vessel (LIMA-LAD, sequential SVG to D1, OM1, OM2) 12/7/2013.  3.  Dyslipidemia.  4.  HTN.  5.  Tobacco use  6.  COPD moderate  7.  Lumbar disc disease with sciatica and peripheral neuropathy.    Recommendation Discussion  1.  Regarding CAD, HOSKINS currently clinically stable without angina, continue aspirin, rosuvastatin, EKG today shows no change compared to prior tracing, sinus bradycardia, rate 54 precludes beta blocker therapy, stress echocardiogram  2.  Regarding hypertension, currently controlled requiring no medical therapy, continue to monitor, optimal BP less than 130/80  3.  Regarding dyslipidemia, last , currently on rosuvastatin 10 mg only 5 times a week, recheck lipid panel and make further recommendations, LDL goal less than 70  4.  Regarding tobacco use, spent 3 minutes, discussed importance of complete smoking cessation for reducing future cardiac events, patient states that he is quit 30 times but has been unable to stop.  5.  Will arrange for stress test and echocardiogram as per requirements for continued employment at [employer] for [email or web address] license.  6.  RTC 1 year, sooner if necessary.      9/13/2022 Called and spoke  with the patient about stress echocardiogram results done for CDL but also with increasing HOSKINS with evidence of COPD; echo and stress echo shows relatively decreased EF 50% and abnormal septal wall motion, SOB, no chest pain, no ischemic EKG changes with prior CABG and anterior MI, reviewed whether symptoms are pulmonary vs cardiac or both, and after further discussion decided to proceed with a cardiac catheterization, discussed the risks and benefits of cardiac catheterization and the procedure itself, rationale and appropriateness .discussed potential complications including but not limited to death, stroke, MI, urgent bypass surgery, contrast nephropathy, vascular complications, bleeding and infection were explained to the patient and the potential treatment possibilities; medical therapy, stent; unlikely redo CABG, he expressed he understanding and wished to proceed.    Sinus bradycardia, rate 54 precludes beta blocker therapy

## 2022-08-29 ENCOUNTER — HOSPITAL ENCOUNTER (OUTPATIENT)
Dept: LAB | Facility: MEDICAL CENTER | Age: 72
End: 2022-08-29
Attending: INTERNAL MEDICINE
Payer: COMMERCIAL

## 2022-08-29 DIAGNOSIS — E78.00 PURE HYPERCHOLESTEROLEMIA: ICD-10-CM

## 2022-08-29 LAB
ALBUMIN SERPL BCP-MCNC: 3.9 G/DL (ref 3.2–4.9)
ALBUMIN/GLOB SERPL: 1.3 G/DL
ALP SERPL-CCNC: 61 U/L (ref 30–99)
ALT SERPL-CCNC: 12 U/L (ref 2–50)
ANION GAP SERPL CALC-SCNC: 10 MMOL/L (ref 7–16)
AST SERPL-CCNC: 16 U/L (ref 12–45)
BILIRUB SERPL-MCNC: 0.6 MG/DL (ref 0.1–1.5)
BUN SERPL-MCNC: 21 MG/DL (ref 8–22)
CALCIUM SERPL-MCNC: 9.4 MG/DL (ref 8.5–10.5)
CHLORIDE SERPL-SCNC: 104 MMOL/L (ref 96–112)
CHOLEST SERPL-MCNC: 148 MG/DL (ref 100–199)
CO2 SERPL-SCNC: 26 MMOL/L (ref 20–33)
CREAT SERPL-MCNC: 0.8 MG/DL (ref 0.5–1.4)
FASTING STATUS PATIENT QL REPORTED: NORMAL
GFR SERPLBLD CREATININE-BSD FMLA CKD-EPI: 94 ML/MIN/1.73 M 2
GLOBULIN SER CALC-MCNC: 3 G/DL (ref 1.9–3.5)
GLUCOSE SERPL-MCNC: 102 MG/DL (ref 65–99)
HDLC SERPL-MCNC: 52 MG/DL
LDLC SERPL CALC-MCNC: 80 MG/DL
POTASSIUM SERPL-SCNC: 4.3 MMOL/L (ref 3.6–5.5)
PROT SERPL-MCNC: 6.9 G/DL (ref 6–8.2)
SODIUM SERPL-SCNC: 140 MMOL/L (ref 135–145)
TRIGL SERPL-MCNC: 81 MG/DL (ref 0–149)

## 2022-08-29 PROCEDURE — 80061 LIPID PANEL: CPT

## 2022-08-29 PROCEDURE — 36415 COLL VENOUS BLD VENIPUNCTURE: CPT

## 2022-08-29 PROCEDURE — 80053 COMPREHEN METABOLIC PANEL: CPT

## 2022-08-29 NOTE — LETTER
August 29, 2022        Hugo Gomez  [address] [place] [address]          Dear Hugo,    We have received the results of your recent:    Lab results    Your test came back stable. Please follow up as previously discussed with your physician.      Feel free to call us with any questions.        Sincerely,        Sophie, Medical Assistant for .    Electronically Signed

## 2022-09-06 ENCOUNTER — HOSPITAL ENCOUNTER (OUTPATIENT)
Dept: CARDIOLOGY | Facility: MEDICAL CENTER | Age: 72
End: 2022-09-06
Attending: INTERNAL MEDICINE
Payer: COMMERCIAL

## 2022-09-06 DIAGNOSIS — I10 PRIMARY HYPERTENSION: ICD-10-CM

## 2022-09-06 DIAGNOSIS — I25.10 CORONARY ARTERY DISEASE INVOLVING NATIVE CORONARY ARTERY OF NATIVE HEART WITHOUT ANGINA PECTORIS: ICD-10-CM

## 2022-09-06 PROCEDURE — 93017 CV STRESS TEST TRACING ONLY: CPT

## 2022-09-06 PROCEDURE — 93306 TTE W/DOPPLER COMPLETE: CPT

## 2022-09-06 PROCEDURE — 93018 CV STRESS TEST I&R ONLY: CPT | Performed by: INTERNAL MEDICINE

## 2022-09-06 PROCEDURE — 93350 STRESS TTE ONLY: CPT | Mod: 26 | Performed by: INTERNAL MEDICINE

## 2022-09-07 LAB
LV EJECT FRACT  99904: 50
LV EJECT FRACT  99904: 50
LV EJECT FRACT MOD 2C 99903: 41.61
LV EJECT FRACT MOD 4C 99902: 40.2
LV EJECT FRACT MOD BP 99901: 38.76

## 2022-09-07 PROCEDURE — 93306 TTE W/DOPPLER COMPLETE: CPT | Mod: 26 | Performed by: INTERNAL MEDICINE

## 2022-09-13 ENCOUNTER — TELEPHONE (OUTPATIENT)
Dept: CARDIOLOGY | Facility: MEDICAL CENTER | Age: 72
End: 2022-09-13
Payer: COMMERCIAL

## 2022-09-13 ENCOUNTER — PATIENT MESSAGE (OUTPATIENT)
Dept: SLEEP MEDICINE | Facility: MEDICAL CENTER | Age: 72
End: 2022-09-13
Payer: COMMERCIAL

## 2022-09-13 DIAGNOSIS — Z95.1 S/P CORONARY ARTERY BYPASS GRAFT X 5: ICD-10-CM

## 2022-09-13 DIAGNOSIS — R94.39 ABNORMAL STRESS ECHOCARDIOGRAM: Primary | ICD-10-CM

## 2022-09-13 DIAGNOSIS — R06.09 DOE (DYSPNEA ON EXERTION): ICD-10-CM

## 2022-09-13 DIAGNOSIS — I25.2 HISTORY OF MYOCARDIAL INFARCTION: ICD-10-CM

## 2022-09-13 RX ORDER — ALBUTEROL SULFATE 90 UG/1
2 AEROSOL, METERED RESPIRATORY (INHALATION) EVERY 4 HOURS PRN
Qty: 1 EACH | Refills: 11 | Status: SHIPPED | OUTPATIENT
Start: 2022-09-13 | End: 2022-12-05

## 2022-09-13 NOTE — TELEPHONE ENCOUNTER
Called and spoke with the patient about stress echocardiogram results done for CDL but also with increasing HOKSINS with evidence of COPD; echo and stress echo shows relatively decreased EF and abnormal septal wall motion, SOB, no chest pain, no ischemic EKG changes with prior CABG and anterior MI, reviewed whether symptoms are pulmonary vs cardiac or both, and after further discussion decided to proceed with a cardiac catheterization, discussed the risks and benefits of cardiac catheterization and the procedure itself, rationale and appropriateness .discussed potential complications including but not limited to death, stroke, MI, urgent bypass surgery, contrast nephropathy, vascular complications, bleeding and infection were explained to the patient and the potential treatment possibilities; medical therapy, stent; unlikely redo CABG, he expressed he understanding and wished to proceed.

## 2022-09-14 ENCOUNTER — PATIENT MESSAGE (OUTPATIENT)
Dept: CARDIOLOGY | Facility: MEDICAL CENTER | Age: 72
End: 2022-09-14
Payer: COMMERCIAL

## 2022-09-14 NOTE — PATIENT COMMUNICATION
Patient is scheduled on 9-19-22 for a C w/poss with Dr. Horan. No meds to stop and patient to check in at 9:30 for an 11:30 procedure. H&P was done on 8-26-22 by Dr. Choi. Pre admit to call patient.

## 2022-09-14 NOTE — TELEPHONE ENCOUNTER
----- Message from Franco Choi M.D. sent at 9/13/2022  4:26 PM PDT -----  Regarding: cardiac cath  Roel would you arrange for a cath for this patient with any interventionalist  Thank you

## 2022-09-15 NOTE — PATIENT COMMUNICATION
Replied to pt via [facility] requesting more information regarding concerns, awaiting pt response.

## 2022-09-16 ENCOUNTER — PRE-ADMISSION TESTING (OUTPATIENT)
Dept: ADMISSIONS | Facility: MEDICAL CENTER | Age: 72
End: 2022-09-16
Attending: INTERNAL MEDICINE
Payer: COMMERCIAL

## 2022-09-16 DIAGNOSIS — Z01.810 PRE-OPERATIVE CARDIOVASCULAR EXAMINATION: ICD-10-CM

## 2022-09-16 DIAGNOSIS — Z01.812 PRE-OPERATIVE LABORATORY EXAMINATION: ICD-10-CM

## 2022-09-16 LAB
ALBUMIN SERPL BCP-MCNC: 4.4 G/DL (ref 3.2–4.9)
ALBUMIN/GLOB SERPL: 1.3 G/DL
ALP SERPL-CCNC: 68 U/L (ref 30–99)
ALT SERPL-CCNC: 12 U/L (ref 2–50)
ANION GAP SERPL CALC-SCNC: 8 MMOL/L (ref 7–16)
APTT PPP: 28.7 SEC (ref 24.7–36)
AST SERPL-CCNC: 16 U/L (ref 12–45)
BILIRUB SERPL-MCNC: 0.9 MG/DL (ref 0.1–1.5)
BUN SERPL-MCNC: 18 MG/DL (ref 8–22)
CALCIUM SERPL-MCNC: 9.9 MG/DL (ref 8.5–10.5)
CHLORIDE SERPL-SCNC: 103 MMOL/L (ref 96–112)
CO2 SERPL-SCNC: 28 MMOL/L (ref 20–33)
CREAT SERPL-MCNC: 0.94 MG/DL (ref 0.5–1.4)
EKG IMPRESSION: NORMAL
ERYTHROCYTE [DISTWIDTH] IN BLOOD BY AUTOMATED COUNT: 49.7 FL (ref 35.9–50)
GFR SERPLBLD CREATININE-BSD FMLA CKD-EPI: 86 ML/MIN/1.73 M 2
GLOBULIN SER CALC-MCNC: 3.3 G/DL (ref 1.9–3.5)
GLUCOSE SERPL-MCNC: 77 MG/DL (ref 65–99)
HCT VFR BLD AUTO: 46.9 % (ref 42–52)
HGB BLD-MCNC: 16.1 G/DL (ref 14–18)
INR PPP: 1.14 (ref 0.87–1.13)
MCH RBC QN AUTO: 32.5 PG (ref 27–33)
MCHC RBC AUTO-ENTMCNC: 34.3 G/DL (ref 33.7–35.3)
MCV RBC AUTO: 94.6 FL (ref 81.4–97.8)
PLATELET # BLD AUTO: 240 K/UL (ref 164–446)
PMV BLD AUTO: 8.9 FL (ref 9–12.9)
POTASSIUM SERPL-SCNC: 4.6 MMOL/L (ref 3.6–5.5)
PROT SERPL-MCNC: 7.7 G/DL (ref 6–8.2)
PROTHROMBIN TIME: 14.5 SEC (ref 12–14.6)
RBC # BLD AUTO: 4.96 M/UL (ref 4.7–6.1)
SODIUM SERPL-SCNC: 139 MMOL/L (ref 135–145)
WBC # BLD AUTO: 10.5 K/UL (ref 4.8–10.8)

## 2022-09-16 PROCEDURE — 85610 PROTHROMBIN TIME: CPT

## 2022-09-16 PROCEDURE — 85027 COMPLETE CBC AUTOMATED: CPT

## 2022-09-16 PROCEDURE — 80053 COMPREHEN METABOLIC PANEL: CPT

## 2022-09-16 PROCEDURE — 93010 ELECTROCARDIOGRAM REPORT: CPT | Performed by: INTERNAL MEDICINE

## 2022-09-16 PROCEDURE — 36415 COLL VENOUS BLD VENIPUNCTURE: CPT

## 2022-09-16 PROCEDURE — 93005 ELECTROCARDIOGRAM TRACING: CPT

## 2022-09-16 PROCEDURE — 85730 THROMBOPLASTIN TIME PARTIAL: CPT

## 2022-09-16 RX ORDER — HYDROCODONE BITARTRATE AND ACETAMINOPHEN 10; 325 MG/1; MG/1
1 TABLET ORAL EVERY 6 HOURS PRN
COMMUNITY
Start: 2022-09-13

## 2022-09-16 ASSESSMENT — FIBROSIS 4 INDEX: FIB4 SCORE: 1.26

## 2022-09-16 NOTE — OR NURSING
Patient had preadmit lab and EKG following preadmit appointment at 15:40.   Preliminary EKG shows Atrial flutter.  During patient interview, patient stated that he felt fine today even after exercising but 2 days ago after exercising he felt tightness in his chest for 2 hours.  Preliminary EKG result faxed to Dr. Horan's office.  I spoke with Dr. Horan's office and informed them that results were faxed and they informed me that it would be seen by MD upon arrival.  Informed MD office that patient has left preadmit department.

## 2022-09-19 ENCOUNTER — HOSPITAL ENCOUNTER (OUTPATIENT)
Facility: MEDICAL CENTER | Age: 72
End: 2022-09-19
Attending: INTERNAL MEDICINE | Admitting: INTERNAL MEDICINE
Payer: COMMERCIAL

## 2022-09-19 ENCOUNTER — APPOINTMENT (OUTPATIENT)
Dept: CARDIOLOGY | Facility: MEDICAL CENTER | Age: 72
End: 2022-09-19
Attending: INTERNAL MEDICINE
Payer: COMMERCIAL

## 2022-09-19 VITALS
HEART RATE: 138 BPM | WEIGHT: 181 LBS | DIASTOLIC BLOOD PRESSURE: 66 MMHG | HEIGHT: 72 IN | BODY MASS INDEX: 24.52 KG/M2 | TEMPERATURE: 97.9 F | OXYGEN SATURATION: 96 % | SYSTOLIC BLOOD PRESSURE: 132 MMHG | RESPIRATION RATE: 30 BRPM

## 2022-09-19 DIAGNOSIS — Z95.1 S/P CORONARY ARTERY BYPASS GRAFT X 5: ICD-10-CM

## 2022-09-19 DIAGNOSIS — I25.2 HISTORY OF MYOCARDIAL INFARCTION: ICD-10-CM

## 2022-09-19 DIAGNOSIS — R06.09 DOE (DYSPNEA ON EXERTION): ICD-10-CM

## 2022-09-19 DIAGNOSIS — R94.39 ABNORMAL STRESS ECHOCARDIOGRAM: ICD-10-CM

## 2022-09-19 PROCEDURE — A9270 NON-COVERED ITEM OR SERVICE: HCPCS

## 2022-09-19 PROCEDURE — 160035 HCHG PACU - 1ST 60 MINS PHASE I

## 2022-09-19 PROCEDURE — 700105 HCHG RX REV CODE 258: Performed by: INTERNAL MEDICINE

## 2022-09-19 PROCEDURE — 700117 HCHG RX CONTRAST REV CODE 255: Performed by: INTERNAL MEDICINE

## 2022-09-19 PROCEDURE — 93459 L HRT ART/GRFT ANGIO: CPT | Performed by: INTERNAL MEDICINE

## 2022-09-19 PROCEDURE — 700102 HCHG RX REV CODE 250 W/ 637 OVERRIDE(OP)

## 2022-09-19 PROCEDURE — 700111 HCHG RX REV CODE 636 W/ 250 OVERRIDE (IP)

## 2022-09-19 PROCEDURE — 700101 HCHG RX REV CODE 250

## 2022-09-19 PROCEDURE — 160046 HCHG PACU - 1ST 60 MINS PHASE II

## 2022-09-19 PROCEDURE — 99152 MOD SED SAME PHYS/QHP 5/>YRS: CPT | Performed by: INTERNAL MEDICINE

## 2022-09-19 PROCEDURE — 160002 HCHG RECOVERY MINUTES (STAT)

## 2022-09-19 PROCEDURE — 99153 MOD SED SAME PHYS/QHP EA: CPT

## 2022-09-19 RX ORDER — HEPARIN SODIUM 200 [USP'U]/100ML
INJECTION, SOLUTION INTRAVENOUS
Status: COMPLETED
Start: 2022-09-19 | End: 2022-09-19

## 2022-09-19 RX ORDER — MIDAZOLAM HYDROCHLORIDE 1 MG/ML
INJECTION INTRAMUSCULAR; INTRAVENOUS
Status: COMPLETED
Start: 2022-09-19 | End: 2022-09-19

## 2022-09-19 RX ORDER — LIDOCAINE HYDROCHLORIDE 20 MG/ML
INJECTION, SOLUTION INFILTRATION; PERINEURAL
Status: COMPLETED
Start: 2022-09-19 | End: 2022-09-19

## 2022-09-19 RX ORDER — VERAPAMIL HYDROCHLORIDE 2.5 MG/ML
INJECTION, SOLUTION INTRAVENOUS
Status: COMPLETED
Start: 2022-09-19 | End: 2022-09-19

## 2022-09-19 RX ORDER — SODIUM CHLORIDE 9 MG/ML
1000 INJECTION, SOLUTION INTRAVENOUS ONCE
Status: COMPLETED | OUTPATIENT
Start: 2022-09-19 | End: 2022-09-19

## 2022-09-19 RX ORDER — ASPIRIN 81 MG/1
TABLET, CHEWABLE ORAL
Status: COMPLETED
Start: 2022-09-19 | End: 2022-09-19

## 2022-09-19 RX ORDER — IBUPROFEN 200 MG
200 TABLET ORAL DAILY
Status: ON HOLD | COMMUNITY
End: 2023-04-20

## 2022-09-19 RX ORDER — HEPARIN SODIUM 1000 [USP'U]/ML
INJECTION, SOLUTION INTRAVENOUS; SUBCUTANEOUS
Status: COMPLETED
Start: 2022-09-19 | End: 2022-09-19

## 2022-09-19 RX ORDER — SODIUM CHLORIDE 9 MG/ML
INJECTION, SOLUTION INTRAVENOUS CONTINUOUS
Status: DISCONTINUED | OUTPATIENT
Start: 2022-09-19 | End: 2022-09-19 | Stop reason: HOSPADM

## 2022-09-19 RX ADMIN — IOHEXOL 100 ML: 350 INJECTION, SOLUTION INTRAVENOUS at 11:21

## 2022-09-19 RX ADMIN — LIDOCAINE HYDROCHLORIDE: 20 INJECTION, SOLUTION INFILTRATION; PERINEURAL at 10:39

## 2022-09-19 RX ADMIN — HEPARIN SODIUM: 1000 INJECTION, SOLUTION INTRAVENOUS; SUBCUTANEOUS at 10:39

## 2022-09-19 RX ADMIN — MIDAZOLAM HYDROCHLORIDE 2 MG: 1 INJECTION, SOLUTION INTRAMUSCULAR; INTRAVENOUS at 11:10

## 2022-09-19 RX ADMIN — VERAPAMIL HYDROCHLORIDE 2.5 MG: 2.5 INJECTION, SOLUTION INTRAVENOUS at 10:39

## 2022-09-19 RX ADMIN — FENTANYL CITRATE 100 MCG: 50 INJECTION, SOLUTION INTRAMUSCULAR; INTRAVENOUS at 11:20

## 2022-09-19 RX ADMIN — HEPARIN SODIUM 2000 UNITS: 200 INJECTION, SOLUTION INTRAVENOUS at 10:39

## 2022-09-19 RX ADMIN — ASPIRIN 81 MG 81 MG: 81 TABLET ORAL at 10:45

## 2022-09-19 RX ADMIN — NITROGLYCERIN 10 ML: 20 INJECTION INTRAVENOUS at 10:39

## 2022-09-19 RX ADMIN — SODIUM CHLORIDE 1000 ML: 9 INJECTION, SOLUTION INTRAVENOUS at 10:18

## 2022-09-19 ASSESSMENT — FIBROSIS 4 INDEX: FIB4 SCORE: 1.37

## 2022-09-19 NOTE — PROCEDURES
Cardiac Catheterization report    9/19/2022  11:26 AM    Referring MD: Dr. Choi    Indication/Preoperative diagnosis:  Equivocal stress echocardiogram  CAD status post CABG  Pre-DOT physical abnormality  Atrial flutter    Postoperative diagnosis:  Patent LIMA to LAD  Patent VG to RCA  Patent VG to OM1 skip OM 2 but occluded tiny DX segment   RCA  LAD fills via widely patent LIMA  Circumflex with patent skip vein grafts  7.  LVEF 50 to 55%, LVEDP 5 mmHg  8.  Atrial flutter throughout    Recommendations:  Guideline directed medical therapy   Cardiovascular Risk factor modification  Consider atrial flutter ablation and initiation of oral anticoagulation in the interim    Procedures performed:  Coronary and bypass graft arteriograms  Left heart catheterization and Left ventriculogram   Supervision moderate sedation      FINDINGS:  I.  HEMODYNAMICS   Ao: 148/93 mmHg   LVEDP: 5 mmHg   Gradient on LV pullback: No    II. CORONARY ANGIOGRAPHY:  Left main coronary artery: Large caliber vessel giving rise to the LAD and circumflex.  No CAD.  Left anterior descending artery: Large caliber vessel which is subtotally occluded in its proximal midportion.  Fills via 1 patent LIMA to LAD to a large distal vessel.  Left circumflex coronary artery: Large-caliber codominant vessel.  There is 50% proximal stenosis.  OM1/ramus is subtotally occluded and fills via retrograde flow from the grafted segment which is widely patent.  OM 2 is chronic totally occluded.  It fills via a patent skip segment from the OM1 graft.  Circumflex itself continues in the AV groove and supplies a small posterior descending vessel and is free from high-grade disease.  Right coronary artery: Chronic totally occluded.  Fills via widely patent vein graft to the distal vessel.  LIMA to LAD: Widely patent  VG to RCA: Widely patent  VG to DX-OM1-OM2: Occluded diagonal segment with widely patent flow to the OM1 and OM 2 and retrograde filling of the OM1  into its large parent branch in continuity with the circumflex.      III.  LEFT VENTRICULOGRAM:  LVEF SHAH PROJECTION: 50-55%      Procedure details:  The risks and benefits of cardiac catheterization and possible intervention were explained to the patient including death, heart attack, stroke, and emergency surgery.  The patient verbalized understanding and wished to proceed.  The patient was brought to the cardiac catheterization laboratory in the fasting state and prepped and draped in the usual sterile fashion.  The right wrist was locally anesthetized with lidocaine and the right radial artery was cannulated with 5/6-[place] equipment and standard radial cocktail was given.  Coronary angiography was performed using standard  diagnostic catheters in the usual fashion and used to cross the aortic valve to perform  left heart catheterization and left ventriculogram. All catheters and guidewires were removed.  A TR-Band was placed without difficulty to achieve patent hemostasis.  Patient tolerated procedure well left the Cath Lab in stable condition.    Complications:  None apparent    Sedation time:  Moderate sedation directly monitored by me during the case while supervising the administration of the sedation medication by an independent trained RN to assist in the monitoring of the patient's level of consciousness and physiological status. I, the supervising physician was present the entire time from beginning of medication administration until the end of the procedure from 10:59 AM until 11:22 AM. For detailed administration records please see the moderate sedation documentation in the media tab.    Following the procedure I discussed the results with the patient and the patients designated contact/family member Yennifer at [phone].    Geronimo Horan MD, FACC, [facility]

## 2022-09-19 NOTE — DISCHARGE INSTRUCTIONS
HOME CARE INSTRUCTIONS    ACTIVITY: Rest and take it easy for the first 24 hours.  A responsible adult is recommended to remain with you during that time.  It is normal to feel sleepy.  We encourage you to not do anything that requires balance, judgment or coordination.    FOR 24 HOURS DO NOT:  Drive, operate machinery or run household appliances.  Drink beer or alcoholic beverages.  Make important decisions or sign legal documents.    SPECIAL INSTRUCTIONS:   POST ANGIOGRAM  General Care Instructions  Maintain a bandage over the incision site for 24 hours.  It's normal to find a small bruise or dime-sized lump at the insertion site. This should disappear within a few weeks.  Do not apply lotions or powders to the site.  Do not immerse the catheter insertion site in water (bathtub/swimming) for five days. It is ok to shower 24 hours after the procedure.  You may resume your normal diet immediately; on the day of your procedure, drink 6-10 glasses of water to help flush the contrast liquid out of your system.  If the doctor inserted the catheter in through your groin:  Walking short distances on a flat surface is OK. Limit going up/down stairs for the first 2 days.  DO NOT do yard work, drive, squat, lift heavy objects, or play sports for 2 days; or until your health care provider tells you it is OK.  If the doctor inserted the catheter in your arm:  For 24 hours, DO NOT lift anything heavier than 10 pounds (approximately a gallon of milk). DO NOT do any heavy pushing, pulling, or twisting.    Medications  If your current medications need to be changed, you will be provided with an updated list of your medications prior to discharge.  If you take warfarin (Coumadin), resume taking your usual dose the evening after the procedure.  DO NOT STOP taking prescribed blood thinning (anti-platelet) medications unless instructed by your cardiologist.  These medications include:  Aspirin, Clopidogrel (Plavix), Ticagrelor  (Brilinta), or Prasugrel (Effient)   If you take one of the following anticoagulants, RESUME 24 HOURS after your procedure:  Apixiban (Eliquis), Rivaroxaban (Xarelto), Dabigatran (Pradaxa), Edoxaban (Savaysa)  If you take metformin (Glucophage), RESUME 48 HOURS after your procedure.    When to call your healthcare provider  Call your cardiologist right away at [phone] if you have any of the following:   Problems/Concerns taking any of your prescribed heart medicines.   The insertion site has increasing pain, swelling, redness, bleeding, or drainage.   Your arm or leg below where the insertion site changes color, is cool, or is numb.   You have chest pain or shortness of breath that does not go away with rest.   Your pulse feels irregular -- very slow (less than 60 beats/minute) or very fast (over 100 beats/minute).   You have dizziness, fainting, or you are very tired.   You are coughing up blood or yellow or green mucus.   You have chills or a fever over 101°F (38.3°C).    If there is bleeding at the catheter insertion site, apply pressure for 10 minutes.  If bleeding persists, call 911, and continue to hold pressure until advanced medical support arrives.        Exercising Safely After Percutaneous Coronary Intervention (PCI)  After percutaneous coronary intervention (PCI), which involves angioplasty and often stenting, it's important to focus on your heart health. Exercise can help strengthen your heart. It can also help you feel good and improve your overall health. Talk with your health care provider or cardiac rehab team member about good options for you.  Start slowly. Work up to more vigorous exercise as you get stronger. Aim for at least 150 minutes of exercise each week.  Include aerobic activities. These make the heart beat faster. They work the heart and lungs, and improve the body's ability to use oxygen. Good choices include walking, swimming, and biking .  Always follow your doctor's  recommendation for exercise.   You have been referred to cardiac rehabilitation, which is important for your recovery.  You may contact [facility] at [phone] to learn more and schedule a visit.        Lifestyle Management After Percutaneous Coronary Intervention (PCI)  Percutaneous coronary intervention (PCI)  involves angioplasty and often stenting. This procedure can open arteries and relieve symptoms. But, it doesn't cure coronary artery disease. New blockages can still form. You need to take steps to prevent this by managing risk factors. Doing so will help make your heart and arteries healthier. Your doctor may prescribe cardiac rehabilitation to help with this lifelong process.  Understanding risk factors  Some risk factors for coronary artery disease can be controlled. These include smoking, high blood pressure, cholesterol, diabetes, and obesity. They can be managed with medication, diet, and exercise. Support and counseling can also play a role. The effort will pay off! Managing risk factors can help you be more active, feel better, and reduce the risk of heart attack.    If you smoke, quit!  If your doctor has been urging you to quit smoking, it's for good reasons. Smoking damages your heart, blood vessels, and lungs. The good news is that quitting can halt or even reverse the damage of smoking. To quit now:  Get medical help. Ask your doctor for advice on stop-smoking programs. Also ask about medications or nicotine replacement therapy products that may help you quit smoking.  Get support. Join a support group. Ask for help from your family and friends.  Don't give up. It often takes several tries to succeed in quitting smoking.  Avoid secondhand smoke. Ask family and friends not to smoke around you.     DIET: To avoid nausea, slowly advance diet as tolerated, avoiding spicy or greasy foods for the first day.  Add more substantial food to your diet according to your  physician's instructions.  Babies can be fed formula or breast milk as soon as they are hungry.  INCREASE FLUIDS AND FIBER TO AVOID CONSTIPATION.    SURGICAL DRESSING/BATHING: Keep dressing clean and dry for 24 hours then remove. Ok to shower after 24 hours.  No hot tubs or bath tubs or pools.    MEDICATIONS: Resume taking daily medication.  Take prescribed pain medication with food.  If no medication is prescribed, you may take non-aspirin pain medication if needed.  PAIN MEDICATION CAN BE VERY CONSTIPATING.  Take a stool softener or laxative such as senokot, pericolace, or milk of magnesia if needed.      A follow-up appointment should be arranged with your doctor in 2-4 weeks; call to schedule.    You should CALL YOUR PHYSICIAN if you develop:  Fever greater than 101 degrees F.  Pain not relieved by medication, or persistent nausea or vomiting.  Excessive bleeding (blood soaking through dressing) or unexpected drainage from the wound.  Extreme redness or swelling around the incision site, drainage of pus or foul smelling drainage.  Inability to urinate or empty your bladder within 8 hours.  Problems with breathing or chest pain.    You should call 911 if you develop problems with breathing or chest pain.  If you are unable to contact your doctor or surgical center, you should go to the nearest emergency room or urgent care center.      Physician's telephone #: Dr. Horan ([phone])    MILD FLU-LIKE SYMPTOMS ARE NORMAL.  YOU MAY EXPERIENCE GENERALIZED MUSCLE ACHES, THROAT IRRITATION, HEADACHE AND/OR SOME NAUSEA.    If any questions arise, call your doctor.  If your doctor is not available, please feel free to call the Surgical Center at [phone].  The Center is open Monday through Friday from 7AM to 7PM.      A registered nurse may call you a few days after your surgery to see how you are doing after your procedure.    You may also receive a survey in the mail within the next two weeks and we ask  that you take a few moments to complete the survey and return it to us.  Our goal is to provide you with very good care and we value your comments.     Depression / Suicide Risk    As you are discharged from this [facility] facility, it is important to learn how to keep safe from harming yourself.    Recognize the warning signs:  Abrupt changes in personality, positive or negative- including increase in energy   Giving away possessions  Change in eating patterns- significant weight changes-  positive or negative  Change in sleeping patterns- unable to sleep or sleeping all the time   Unwillingness or inability to communicate  Depression  Unusual sadness, discouragement and loneliness  Talk of wanting to die  Neglect of personal appearance   Rebelliousness- reckless behavior  Withdrawal from people/activities they love  Confusion- inability to concentrate     If you or a loved one observes any of these behaviors or has concerns about self-harm, here's what you can do:  Talk about it- your feelings and reasons for harming yourself  Remove any means that you might use to hurt yourself (examples: pills, rope, extension cords, firearm)  Get professional help from the community (Mental Health, Substance Abuse, psychological counseling)  Do not be alone:Call your Safe Contact- someone whom you trust who will be there for you.  Call your local CRISIS HOTLINE 798-6190 or 829-161-5756  Call your local Children's Mobile Crisis Response Team [place] (004) 028-5482 or www.[place]  Call the toll free National Suicide Prevention Hotlines   National Suicide Prevention Lifeline 103-178-MZCJ (5858)  National Hope Line Network 800-SUICIDE (362-8827)    I acknowledge receipt and understanding of these Home Care instructions.

## 2022-09-19 NOTE — PROGRESS NOTES
Med rec updated and complete, per pt   Allergies reviewed, per pt   Interviewed pt with wife at bedside with permission from pt.

## 2022-09-19 NOTE — PROGRESS NOTES
Patient in pre-op, assessment completed, patient and wife Yennifer updated on plan of care, all questions answered, no further needs at this time, call light within reach.

## 2022-09-19 NOTE — OR NURSING
1131 Patient arrived to PACU from cath lab.  Report from RN.  Patient is awake and alert.  TR band to left radial is clean, dry and soft, patient denies pain or numbness to site.  Patient updated on plan of care.  1144 Wife Yennifer called and updated on patient status.  1155 1 ml air removed from TR band, no bleeding to site.   1210 Phone and glasses returned to patient.  1218 2 ml air removed from TR band, no bleeding to site.   1230 2 ml air removed from TR band, no bleeding to site.   1240 3 ml air removed from TR band, no bleeding to site.   1250 3 ml air removed from TR band, no bleeding to site.  1258 Final 2 ml air removed from TR band, no bleeding to site.  TR band removed, dressing placed.  1313 Report called to Hugo LOPEZ.    1314 Patient transferred to Phase 2.

## 2022-09-19 NOTE — OR NURSING
@1319 PT arrived to Phase 2. Pt has no complaints of pain or nausea. Dressing site is clean dry and intact. PT mobilized from gurney to chair. Vital signs stable.    @1335 PTs wife brought back to discharge area. Discharge instructions discussed with patient at patients bedside. Pt verbalizes understanding.    @1345 PIV removed. PT able to dress self without assistance.    @1353 Pt discharged to home with all belongings

## 2022-09-27 ENCOUNTER — OFFICE VISIT (OUTPATIENT)
Dept: CARDIOLOGY | Facility: MEDICAL CENTER | Age: 72
End: 2022-09-27
Payer: COMMERCIAL

## 2022-09-27 VITALS
HEART RATE: 59 BPM | SYSTOLIC BLOOD PRESSURE: 118 MMHG | OXYGEN SATURATION: 95 % | RESPIRATION RATE: 16 BRPM | DIASTOLIC BLOOD PRESSURE: 70 MMHG | BODY MASS INDEX: 24.52 KG/M2 | WEIGHT: 181 LBS | HEIGHT: 72 IN

## 2022-09-27 DIAGNOSIS — I48.3 TYPICAL ATRIAL FLUTTER (HCC): Chronic | ICD-10-CM

## 2022-09-27 DIAGNOSIS — F17.200 TOBACCO DEPENDENCE: ICD-10-CM

## 2022-09-27 DIAGNOSIS — Z98.890 HISTORY OF CARDIAC CATHETERIZATION: Chronic | ICD-10-CM

## 2022-09-27 DIAGNOSIS — I48.91 POSTOPERATIVE ATRIAL FIBRILLATION (HCC): Chronic | ICD-10-CM

## 2022-09-27 DIAGNOSIS — I25.10 CORONARY ARTERY DISEASE DUE TO LIPID RICH PLAQUE: ICD-10-CM

## 2022-09-27 DIAGNOSIS — I97.89 POSTOPERATIVE ATRIAL FIBRILLATION (HCC): Chronic | ICD-10-CM

## 2022-09-27 DIAGNOSIS — I10 PRIMARY HYPERTENSION: Chronic | ICD-10-CM

## 2022-09-27 DIAGNOSIS — Z95.1 S/P CORONARY ARTERY BYPASS GRAFT X 5: ICD-10-CM

## 2022-09-27 DIAGNOSIS — I25.83 CORONARY ARTERY DISEASE DUE TO LIPID RICH PLAQUE: ICD-10-CM

## 2022-09-27 DIAGNOSIS — I25.10 CORONARY ARTERY DISEASE INVOLVING NATIVE CORONARY ARTERY OF NATIVE HEART WITHOUT ANGINA PECTORIS: ICD-10-CM

## 2022-09-27 PROBLEM — Z78.9 STATIN INTOLERANCE: Status: RESOLVED | Noted: 2020-03-09 | Resolved: 2022-09-27

## 2022-09-27 PROCEDURE — 99214 OFFICE O/P EST MOD 30 MIN: CPT | Mod: 25 | Performed by: INTERNAL MEDICINE

## 2022-09-27 PROCEDURE — 99406 BEHAV CHNG SMOKING 3-10 MIN: CPT | Performed by: INTERNAL MEDICINE

## 2022-09-27 RX ORDER — ROSUVASTATIN CALCIUM 10 MG/1
10 TABLET, COATED ORAL DAILY
Qty: 90 TABLET | Refills: 3 | Status: SHIPPED | OUTPATIENT
Start: 2022-09-27 | End: 2024-01-23

## 2022-09-27 RX ORDER — VARENICLINE TARTRATE 1 MG/1
1 TABLET, FILM COATED ORAL 2 TIMES DAILY
Qty: 60 TABLET | Refills: 3 | Status: ON HOLD | OUTPATIENT
Start: 2022-09-27 | End: 2023-05-30

## 2022-09-27 ASSESSMENT — ENCOUNTER SYMPTOMS
SORE THROAT: 0
SHORTNESS OF BREATH: 0
FEVER: 0
FALLS: 0
DIZZINESS: 0
ABDOMINAL PAIN: 0
BLURRED VISION: 0
CHILLS: 0
NAUSEA: 0
WEAKNESS: 0
PALPITATIONS: 0
FOCAL WEAKNESS: 0
PND: 0
COUGH: 0
CLAUDICATION: 0
BRUISES/BLEEDS EASILY: 0

## 2022-09-27 ASSESSMENT — FIBROSIS 4 INDEX: FIB4 SCORE: 1.37

## 2022-09-27 NOTE — PATIENT INSTRUCTIONS
A - Antiplatelet - Aspirin 81 mg daily or other antiplatelets (clopidogrel (PLAVIX), prasrugrel (EFFIENT), ticagrelor (BRILINTA)) reduce your risk.  B - Blood Pressure Control - reduces your risk or heart attack and stroke.  C - Cholesterol Management - statins reduce your risk; for those that are intolerant to statins, there are alternatives.  D - Diet - Cardiac rehab diets, [email or web address].  E - Exercise - at least 5 hours of moderate exercise weekly  (typical brisk walking or similar activity).  F - Fats - VASCEPA,  or Fish oil (if Vascepa too expensive) for elevated triglycerides (REDUCE IT trial showed reduction from 22% 5 year MACE to 17%).  G - Good Vibes - meditation, exercise, yoga, mindfulness, stress reduction.  H - Heart Failure - betablockers, sucubatril (ENTRESTO) 16% less risk of dying over 3 years, spironolactone, dapagliflozin (FARXIGA) 17% less risk of dying over 2 years, CRT +/- ICD.  I - Inflammation - Colchicine in the LoDoCo2 study in 2020 reduced the risk of heart attack by 30% in 2.5 year follow up.  R - Rehab - Cardiac rehab reduces risk of dying by 13-24% and need to go to the hospital by 30% within the first year.  S - Smoking - never smoke, if you do smoke ask for help to quit for good.  T - Type II Diabetes - pills empagliflozin (JARDIANCE) 38% less risk of dying over 4 years, and/or weekly injections liraglutide (Victoza), semaglutide (Ozempic), dulaglutide (Trulicity) ~26% less risk of MACE in 2 years.  V - Vaccines - Annual flu shot and COVID vaccine reduces the risk of serious cardiovascular complications from these deadly infections.  W - Weight - maintain a healthy weight.      Work on at least 1.5 - 5 hours a week of moderate exercise    Please look into the following diets and incorporate them into your diet  LOW SALT DIET   KEEP YOUR SODIUM EQUAL TO CALORIES AND NO MORE THAN DOUBLE THE CALORIES FOR A LOW SALT DIET    Cardiosmart.org - great resource for American College of  Cardiology on heart disease prevention and treatment    FOR TREATMENT OR PREVENTION OF CORONARY ARTERY DISEASE  These three programs are approved by Medicare/Insurers for those with heart disease  [facility]  Dr. [place] Program for Reversing Heart Disease - Sebastian Ricardo's Cardiologist vegetarian-based  [facility] - [facility]    This is a commonly referenced Program  Dr Harrell - [employer] [facility] [employer] (book and documentary) - vegetarian-based    FOR TREATMENT OF BLOOD PRESSURE  DASH DIET - American Heart Association for treatment of HYPERTENSION    FOR TREATMENT OF BAD CHOLESTEROL/FATS  REDUCE PROCESSED SUGAR AS MUCH AS POSSIBLE  INCREASE WHOLE GRAINS/VEGETABLES  INCREASE FIBER    Lowering total cholesterol and LDL (bad) cholesterol:  - Eat leaner cuts of meat, or eliminate altogether if possible red meat, and frequently substitute fish or chicken.  - Limit saturated fat to no more than 7-10% of total calories no more than 10 g per day is recommended. Some sources of saturated fat include butter, animal fats, hydrogenated vegetable fats and oils, many desserts, whole milk dairy products.  - Replaced saturated fats with polyunsaturated fats and monounsaturated fats. Foods high in monounsaturated fat include nuts, canola oil, avocados, and olives.  - Limit trans fat (processed foods) and replaced with fresh fruits and vegetables  - Recommend nonfat dairy products  - Increase substantially the amount of soluble fiber intake (legumes such as beans, fruit, whole grains).  - Consider nutritional supplements: plant sterile spreads such as Benecol, fish oil,  flaxseed oil, omega-3 acids capsules 1000 mg twice a day, or viscous fiber such as Metamucil  - Attain ideal weight and regular exercise (at least 30 minutes per day of moderate exercise)  ASK ABOUT STATIN OR NON STATIN MEDICATION TO REDUCE YOUR LDL AND HEART RISK    Lowering  triglycerides:  - Reduce intake of simple sugar: Desserts, candy, pastries, honey, sodas, sugared cereals, yogurt, Gatorade, sports bars, canned fruit, smoothies, fruit juice, coffee drinks  - Reduced intake of refined starches: Refined Pasta, most bread  - Reduce or abstain from alcohol  - Increase omega-3 fatty acids: [place], [place]out, Mackerel, [place], [place] tuna and supplements  - Attain ideal weight and regular exercise (at least 30 minutes per day of moderate exercise)  ASK ABOUT PURIFIED OMEGA 3 EPA or FISH OIL TO REDUCE YOUR TG AND HEART RISK    Elevating HDL (good) cholesterol:  - Increase physical activity  - Increase omega-3 fatty acids and supplements as listed above  - Incorporating appropriate amounts of monounsaturated fats such as nuts, olive oil, canola oil, avocados, olives  - Stop smoking  - Attain ideal weight and regular exercise (at least 30 minutes per day of moderate exercise)

## 2022-09-27 NOTE — LETTER
September 27, 2022       Patient: Hugo Gomez   YOB: 1950   Date of Visit: 9/27/2022         To Whom It May Concern:    In my medical opinion, I recommend that Hugo Gomez return to full duty, no restrictions.  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    If you have any questions or concerns, please don't hesitate to call [phone]          Sincerely,          Sukhjinder Stephenson M.D.  Electronically Signed

## 2022-09-27 NOTE — PROGRESS NOTES
Chief Complaint   Patient presents with    Coronary Artery Disease    Hyperlipidemia    Hypertension       Subjective     Sebastian Gomez is a 72 y.o. male who presents today for follow-up of his recent finding of atrial flutter which was seen in the setting of an angiogram given equivocal stress test he is angiogram showed patent grafts but he had atrial flutter seen on the preprocedure EKG and during the case he had not noticed any palpitations or shortness of breath    He is interested in quit smoking    Past Medical History:   Diagnosis Date    Arrhythmia     pt unaware    ASTHMA     uses inhaler once a day    Breath shortness     Bronchitis     Cataract 2022    IOL bilat    Coronary artery disease due to lipid rich plaque - CABG     High cholesterol     History of myocardial infarction     unaware of at the time    Pain 09/16/2022    intermittent in chest with exercise    Pneumonia     S/P coronary artery bypass graft x 5     Sleep apnea     no cpap    Snoring      Past Surgical History:   Procedure Laterality Date    CATARACT EXTRACTION WITH IOL Bilateral 2022    FISTULA IN ANO REPAIR  2018    MULTIPLE CORONARY ARTERY BYPASS ENDO VEIN HARVEST  12/07/2013    Performed by Mo Vincent M.D. at [facility]    CHOLECYSTECTOMY  1996     Family History   Problem Relation Age of Onset    Heart Disease Neg Hx      Social History     Socioeconomic History    Marital status:      Spouse name: Kaylee    Number of children: Not on file    Years of education: Not on file    Highest education level: Not on file   Occupational History     Employer: [employer]   Tobacco Use    Smoking status: Some Days     Packs/day: 0.50     Years: 55.00     Pack years: 27.50     Types: Cigarettes    Smokeless tobacco: Never   Vaping Use    Vaping Use: Never used   Substance and Sexual Activity    Alcohol use: Yes     Comment: 3 beers a week    Drug use: No    Sexual activity: Not on file   Other Topics Concern    Not  on file   Social History Narrative    Not on file     Social Determinants of Health     Financial Resource Strain: Not on file   Food Insecurity: Not on file   Transportation Needs: Not on file   Physical Activity: Not on file   Stress: Not on file   Social Connections: Not on file   Intimate Partner Violence: Not on file   Housing Stability: Not on file     Allergies   Allergen Reactions    Cat Hair Extract Runny Nose and Itching     .     Outpatient Encounter Medications as of 9/27/2022   Medication Sig Dispense Refill    ibuprofen (MOTRIN) 200 MG Tab Take 200 mg by mouth every 6 hours as needed for Mild Pain.      HYDROcodone/acetaminophen (NORCO)  MG Tab Take 1 Tablet by mouth every 6 hours as needed for Severe Pain.      albuterol (VENTOLIN HFA) 108 (90 Base) MCG/ACT Aero Soln inhalation aerosol Inhale 2 Puffs every four hours as needed for Shortness of Breath. 1 Each 11    rosuvastatin (CRESTOR) 10 MG Tab Take 1 Tablet by mouth every day. Please call [phone] to schedule a follow up with a new provider. 90 Tablet 0    aspirin EC 81 MG EC tablet Take 1 Tab by mouth every day. 30 Tab 3     No facility-administered encounter medications on file as of 9/27/2022.     Review of Systems   Constitutional:  Negative for chills and fever.   HENT:  Negative for sore throat.    Eyes:  Negative for blurred vision.   Respiratory:  Negative for cough and shortness of breath.    Cardiovascular:  Negative for chest pain, palpitations, claudication, leg swelling and PND.   Gastrointestinal:  Negative for abdominal pain and nausea.   Musculoskeletal:  Negative for falls and joint pain.   Skin:  Negative for rash.   Neurological:  Negative for dizziness, focal weakness and weakness.   Endo/Heme/Allergies:  Does not bruise/bleed easily.            Objective     /70 (BP Location: Left arm, Patient Position: Sitting, BP Cuff Size: Adult)   Pulse (!) 59   Resp 16   Ht 1.829 m (6')   Wt 82.1 kg (181 lb)   SpO2 95%    BMI 24.55 kg/m²     Physical Exam  Constitutional:       General: He is not in acute distress.     Appearance: He is not diaphoretic.   HENT:      Mouth/Throat:      Comments: Wearing a mask for COVID protocol  Eyes:      General: No scleral icterus.  Neck:      Vascular: No JVD.   Cardiovascular:      Rate and Rhythm: Normal rate.      Heart sounds: Normal heart sounds. No murmur heard.    No friction rub. No gallop.   Pulmonary:      Effort: No respiratory distress.      Breath sounds: No wheezing or rales.   Abdominal:      General: Bowel sounds are normal.      Palpations: Abdomen is soft.   Musculoskeletal:      Right lower leg: No edema.      Left lower leg: No edema.   Skin:     Findings: No rash.   Neurological:      Mental Status: He is alert. Mental status is at baseline.   Psychiatric:         Mood and Affect: Mood normal.                We reviewed his angiogram report  Assessment & Plan     1. S/P coronary artery bypass graft x 5        2. Coronary artery disease due to lipid rich plaque        3. History of cardiac catheterization        4. Typical atrial flutter (HCC) sen 9/16/2022  REFERRAL TO CARDIOLOGY      5. Primary hypertension        6. Tobacco dependence        7. Coronary artery disease involving native coronary artery of native heart without angina pectoris  rosuvastatin (CRESTOR) 10 MG Tab      8. Postoperative atrial fibrillation (HCC) 2013 in setting of CABG            Medical Decision Making: Today's Assessment/Status/Plan:        It was my pleasure to meet with Mr. Gomez.    We addressed the management of hypertension at today's visit. Blood pressure is well controlled.  We specifically assessed the labs on hypertension treatment    We addressed the management of dyslipidemia at today's visit. He is on appropriate statin.    We addressed the management of coronary artery disease.  He is on proper antiplatelet, cholesterol management and beta-blockers as appropriate.  We addressed  the potential side effects and laboratory follow-up for these medications.    Started anticoagulation for his atrial flutter he should have ablation, given he had postoperative atrial fibrillation back at the time of his CABG he may warrant long-term anticoagulation we can certainly get an event monitor after his flutter ablation to help inform a decision, he will stop his aspirin    I spent 4 minutes of face to face counseling on the vital need for smoking cessation.  We discussed pharmacotherapy measures for quiting including nicotine replacement.  He agrees to try Chantix      I will see Mr. Gomez back in 1 year time and encouraged him to follow up with us over the phone or electronically using my [employer] as issues arise.    It is my pleasure to participate in the care of Mr. Gomez.  Please do not hesitate to contact me with questions or concerns.    Sukhjinder Stephenson MD PhD [facility]  Cardiologist [facility]    Please note that this dictation was created using voice recognition software. There may be errors I did not discover before finalizing the note.     Mr. Gomez's care is highly complex due to high risk diagnosis with either severe exacerbation, progression, or side effects of treatment. We specifically discussed the need for high risk medication requiring at least quarterly testing and/or made a decision on elective/emergent major surgery with identified patient or procedure risk factors specific to Mr. Gomez. I have personally spent extra time in discussion about these facts with Mr. Gomez and reviewed and or ordered at least 3 tests, documents or other physician/GEOVANNY reports available including labs, imaging and EKGs as appropriate separate from today's encounter.  I have reviewed images with Mr. Gomez and personally interpreted on this encounter day the referenced EKG, echocardiogram, stress tests, CT scan, cardiac catheterization or other cardiac  imaging and my personal interpretation is what is specifically stated in this note.

## 2022-10-03 ENCOUNTER — APPOINTMENT (RX ONLY)
Dept: URBAN - METROPOLITAN AREA CLINIC 6 | Facility: CLINIC | Age: 72
Setting detail: DERMATOLOGY
End: 2022-10-03

## 2022-10-03 DIAGNOSIS — L81.4 OTHER MELANIN HYPERPIGMENTATION: ICD-10-CM

## 2022-10-03 DIAGNOSIS — Z71.89 OTHER SPECIFIED COUNSELING: ICD-10-CM

## 2022-10-03 DIAGNOSIS — D22 MELANOCYTIC NEVI: ICD-10-CM

## 2022-10-03 DIAGNOSIS — Z85.828 PERSONAL HISTORY OF OTHER MALIGNANT NEOPLASM OF SKIN: ICD-10-CM

## 2022-10-03 DIAGNOSIS — D18.0 HEMANGIOMA: ICD-10-CM

## 2022-10-03 DIAGNOSIS — L82.1 OTHER SEBORRHEIC KERATOSIS: ICD-10-CM

## 2022-10-03 DIAGNOSIS — L57.0 ACTINIC KERATOSIS: ICD-10-CM

## 2022-10-03 PROBLEM — D22.5 MELANOCYTIC NEVI OF TRUNK: Status: ACTIVE | Noted: 2022-10-03

## 2022-10-03 PROBLEM — D48.5 NEOPLASM OF UNCERTAIN BEHAVIOR OF SKIN: Status: ACTIVE | Noted: 2022-10-03

## 2022-10-03 PROBLEM — C44.619 BASAL CELL CARCINOMA OF SKIN OF LEFT UPPER LIMB, INCLUDING SHOULDER: Status: ACTIVE | Noted: 2022-10-03

## 2022-10-03 PROBLEM — D18.01 HEMANGIOMA OF SKIN AND SUBCUTANEOUS TISSUE: Status: ACTIVE | Noted: 2022-10-03

## 2022-10-03 PROCEDURE — 17004 DESTROY PREMAL LESIONS 15/>: CPT

## 2022-10-03 PROCEDURE — ? SUNSCREEN RECOMMENDATIONS

## 2022-10-03 PROCEDURE — 99213 OFFICE O/P EST LOW 20 MIN: CPT | Mod: 25

## 2022-10-03 PROCEDURE — ? SHAVE REMOVAL AND DESTRUCTION

## 2022-10-03 PROCEDURE — 17261 DSTRJ MAL LES T/A/L .6-1.0CM: CPT | Mod: 59

## 2022-10-03 PROCEDURE — ? BIOPSY BY SHAVE METHOD

## 2022-10-03 PROCEDURE — 11102 TANGNTL BX SKIN SINGLE LES: CPT | Mod: 59

## 2022-10-03 PROCEDURE — ? LIQUID NITROGEN

## 2022-10-03 PROCEDURE — 11103 TANGNTL BX SKIN EA SEP/ADDL: CPT

## 2022-10-03 PROCEDURE — ? COUNSELING

## 2022-10-03 ASSESSMENT — LOCATION SIMPLE DESCRIPTION DERM
LOCATION SIMPLE: RIGHT OCCIPITAL SCALP
LOCATION SIMPLE: SCALP
LOCATION SIMPLE: RIGHT LOWER BACK
LOCATION SIMPLE: LEFT UPPER BACK
LOCATION SIMPLE: RIGHT CALF
LOCATION SIMPLE: RIGHT CHEEK
LOCATION SIMPLE: CHEST
LOCATION SIMPLE: LEFT WRIST
LOCATION SIMPLE: LEFT OCCIPITAL SCALP
LOCATION SIMPLE: ABDOMEN
LOCATION SIMPLE: LEFT FOREARM
LOCATION SIMPLE: RIGHT HAND
LOCATION SIMPLE: RIGHT UPPER BACK
LOCATION SIMPLE: LEFT PRETIBIAL REGION
LOCATION SIMPLE: LEFT HAND

## 2022-10-03 ASSESSMENT — LOCATION ZONE DERM
LOCATION ZONE: FACE
LOCATION ZONE: TRUNK
LOCATION ZONE: HAND
LOCATION ZONE: ARM
LOCATION ZONE: LEG
LOCATION ZONE: SCALP

## 2022-10-03 ASSESSMENT — LOCATION DETAILED DESCRIPTION DERM
LOCATION DETAILED: LEFT MEDIAL SUPERIOR CHEST
LOCATION DETAILED: LEFT RADIAL DORSAL HAND
LOCATION DETAILED: RIGHT INFERIOR MEDIAL UPPER BACK
LOCATION DETAILED: RIGHT SUPERIOR PARIETAL SCALP
LOCATION DETAILED: LEFT DISTAL DORSAL FOREARM
LOCATION DETAILED: RIGHT CENTRAL MALAR CHEEK
LOCATION DETAILED: LEFT INFERIOR MEDIAL UPPER BACK
LOCATION DETAILED: LEFT ULNAR DORSAL HAND
LOCATION DETAILED: RIGHT PROXIMAL CALF
LOCATION DETAILED: LEFT SUPERIOR OCCIPITAL SCALP
LOCATION DETAILED: RIGHT SUPERIOR OCCIPITAL SCALP
LOCATION DETAILED: LEFT DORSAL MIDDLE METACARPOPHALANGEAL JOINT
LOCATION DETAILED: RIGHT ULNAR DORSAL HAND
LOCATION DETAILED: RIGHT DORSAL RING METACARPOPHALANGEAL JOINT
LOCATION DETAILED: LEFT PROXIMAL PRETIBIAL REGION
LOCATION DETAILED: LEFT DORSAL WRIST
LOCATION DETAILED: EPIGASTRIC SKIN
LOCATION DETAILED: RIGHT RADIAL DORSAL HAND
LOCATION DETAILED: RIGHT INFERIOR LATERAL MIDBACK
LOCATION DETAILED: LEFT MEDIAL UPPER BACK

## 2022-10-03 NOTE — PROCEDURE: LIQUID NITROGEN
Number Of Freeze-Thaw Cycles: 2 freeze-thaw cycles
Render Post-Care Instructions In Note?: no
Post-Care Instructions: I reviewed with the patient in detail post-care instructions. Patient is to wear sunprotection, and avoid picking at any of the treated lesions. Pt may apply Vaseline to crusted or scabbing areas.
Show Aperture Variable?: Yes
Consent: The patient's consent was obtained including but not limited to risks of crusting, scabbing, blistering, scarring, darker or lighter pigmentary change, recurrence, incomplete removal and infection.
Detail Level: Zone
Duration Of Freeze Thaw-Cycle (Seconds): 8

## 2022-10-03 NOTE — PROCEDURE: MIPS QUALITY
Quality 431: Preventive Care And Screening: Unhealthy Alcohol Use - Screening: Patient not identified as an unhealthy alcohol user when screened for unhealthy alcohol use using a systematic screening method
Quality 110: Preventive Care And Screening: Influenza Immunization: Influenza immunization was not ordered or administered, reason not given
Quality 111:Pneumonia Vaccination Status For Older Adults: Pneumococcal vaccine (PPSV23) administered on or after patient’s 60th birthday and before the end of the measurement period
Quality 226: Preventive Care And Screening: Tobacco Use: Screening And Cessation Intervention: Patient screened for tobacco use and is an ex/non-smoker
Quality 130: Documentation Of Current Medications In The Medical Record: Current Medications Documented
Detail Level: Detailed

## 2022-10-03 NOTE — PROCEDURE: SHAVE REMOVAL AND DESTRUCTION
Detail Level: Detailed
Size Of Lesion In Cm: 0.4
Size After Destruction (Required For Destruction Billing): 0.8
Anesthesia Type: 1% lidocaine with epinephrine
Anesthesia Volume In Cc: 3
Anesthesia Volume In Cc: 0
Hemostasis: Drysol
Cautery Type: electrodesiccation
Was Curettage Performed?: Yes
Wound Care: Petrolatum
Dressing: Band-Aid
Accession #: [id number]
Bill As?: Note: Bill Malignant Destruction If Path Confirms Malignant Lesion. Only Bill As Shave Removal If Path Comes Back Benign. Do Not Bill Shave Removal On Malignant Lesions.: Malignant Destruction
Lab: [id number]
Lab Facility: 
Path Notes Override (Will Replace All Of The Above Text): Please check margins.
Render Path Notes In Note?: No
Consent: Written consent was obtained and risks were reviewed including but not limited to scarring, infection, bleeding, scabbing, incomplete removal, nerve damage and allergy to anesthesia.
Post-Care Instructions: I reviewed with the patient in detail post-care instructions. Patient is to keep the biopsy site dry overnight, and then apply bacitracin twice daily until healed. Patient may apply hydrogen peroxide soaks to remove any crusting.
Notification Instructions: Patient will be notified of biopsy results. However, patient instructed to call the office if not contacted within 2 weeks.
Billing Type: Third-Party Bill

## 2022-10-17 ENCOUNTER — PATIENT MESSAGE (OUTPATIENT)
Dept: CARDIOLOGY | Facility: MEDICAL CENTER | Age: 72
End: 2022-10-17
Payer: COMMERCIAL

## 2022-10-18 ENCOUNTER — TELEPHONE (OUTPATIENT)
Dept: CARDIOLOGY | Facility: MEDICAL CENTER | Age: 72
End: 2022-10-18
Payer: COMMERCIAL

## 2022-10-31 ENCOUNTER — APPOINTMENT (OUTPATIENT)
Dept: RADIOLOGY | Facility: MEDICAL CENTER | Age: 72
End: 2022-10-31
Attending: INTERNAL MEDICINE
Payer: COMMERCIAL

## 2022-11-07 ENCOUNTER — HOSPITAL ENCOUNTER (OUTPATIENT)
Dept: RADIOLOGY | Facility: MEDICAL CENTER | Age: 72
End: 2022-11-07
Attending: INTERNAL MEDICINE
Payer: COMMERCIAL

## 2022-11-07 DIAGNOSIS — R93.89 ABNORMAL CT OF THE CHEST: ICD-10-CM

## 2022-11-07 DIAGNOSIS — J43.9 PULMONARY EMPHYSEMA, UNSPECIFIED EMPHYSEMA TYPE (HCC): ICD-10-CM

## 2022-11-07 DIAGNOSIS — J18.9 PNEUMONIA OF RIGHT UPPER LOBE DUE TO INFECTIOUS ORGANISM: ICD-10-CM

## 2022-11-07 PROCEDURE — 71250 CT THORAX DX C-: CPT

## 2022-11-14 ENCOUNTER — OFFICE VISIT (OUTPATIENT)
Dept: SLEEP MEDICINE | Facility: MEDICAL CENTER | Age: 72
End: 2022-11-14
Payer: COMMERCIAL

## 2022-11-14 VITALS
RESPIRATION RATE: 16 BRPM | WEIGHT: 181 LBS | OXYGEN SATURATION: 95 % | SYSTOLIC BLOOD PRESSURE: 104 MMHG | DIASTOLIC BLOOD PRESSURE: 66 MMHG | BODY MASS INDEX: 24.52 KG/M2 | HEIGHT: 72 IN | HEART RATE: 56 BPM

## 2022-11-14 DIAGNOSIS — J18.9 PNEUMONIA OF RIGHT UPPER LOBE DUE TO INFECTIOUS ORGANISM: ICD-10-CM

## 2022-11-14 DIAGNOSIS — Z72.0 TOBACCO USE: ICD-10-CM

## 2022-11-14 DIAGNOSIS — J43.9 PULMONARY EMPHYSEMA, UNSPECIFIED EMPHYSEMA TYPE (HCC): ICD-10-CM

## 2022-11-14 PROCEDURE — 99214 OFFICE O/P EST MOD 30 MIN: CPT | Performed by: INTERNAL MEDICINE

## 2022-11-14 RX ORDER — UMECLIDINIUM BROMIDE AND VILANTEROL TRIFENATATE 62.5; 25 UG/1; UG/1
1 POWDER RESPIRATORY (INHALATION) DAILY
Qty: 2 EACH | Refills: 0 | COMMUNITY
Start: 2022-11-14 | End: 2022-11-14

## 2022-11-14 RX ORDER — UMECLIDINIUM BROMIDE AND VILANTEROL TRIFENATATE 62.5; 25 UG/1; UG/1
1 POWDER RESPIRATORY (INHALATION) DAILY
Qty: 4 EACH | Refills: 0 | Status: ON HOLD | OUTPATIENT
Start: 2022-11-14 | End: 2023-04-19

## 2022-11-14 ASSESSMENT — ENCOUNTER SYMPTOMS
COUGH: 0
SORE THROAT: 0
DIZZINESS: 0
WEIGHT LOSS: 0
FALLS: 0
STRIDOR: 0
DIARRHEA: 0
HEADACHES: 0
WHEEZING: 0
NAUSEA: 0
EYE PAIN: 0
EYE REDNESS: 0
FOCAL WEAKNESS: 0
DEPRESSION: 0
ORTHOPNEA: 0
SHORTNESS OF BREATH: 0
DOUBLE VISION: 0
MYALGIAS: 0
DIAPHORESIS: 0
HEARTBURN: 0
SINUS PAIN: 0
PALPITATIONS: 0
PND: 0
VOMITING: 0
FEVER: 0
ABDOMINAL PAIN: 0
BLURRED VISION: 0
NECK PAIN: 0
CHILLS: 0
CLAUDICATION: 0
BACK PAIN: 0
SPUTUM PRODUCTION: 0
CONSTIPATION: 0
WEAKNESS: 0
SPEECH CHANGE: 0
EYE DISCHARGE: 0
PHOTOPHOBIA: 0
HEMOPTYSIS: 0
TREMORS: 0

## 2022-11-14 ASSESSMENT — FIBROSIS 4 INDEX: FIB4 SCORE: 1.37

## 2022-11-14 NOTE — PROGRESS NOTES
Chief Complaint   Patient presents with    Follow-Up     Last seen 6/21/22     Results     PFT 8/23/22, HRCT 11/7/22          HPI: This patient is a 72 y.o. male whom is followed in our clinic for COPD last seen by me on 621/22.  Past medical history significant for coronary artery disease status post CABG roughly 9 years ago which was complicated by angioedema likely secondary to ACE inhibitor.  He is a current tobacco smoker with greater than 40-pack-year history currently smoking 1/2 pack/day.  He works for [employer] in the [employer] yard but is retired from a career as a manager for [employer].  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  Patient complained of decreased exercise tolerance with increased shortness of breath that had occurred over the previous year.  No significant cough although he does occasionally have need to clear phlegm.  He suffered from right upper lobe pneumonia in November and HRCT was done on 5 weeks after his initial diagnosis which showed some emphysematous changes in the bilateral upper lobes and what was described by a fibrotic and cystic process in the right upper lobe that appeared to me just resolving pneumonia.  He was recovering by the time I saw him in June able to do an hour of cardiovascular exercise daily.  We ordered HRCT which was done on November 7 and showed no evidence of UIP or interstitial lung disease.  Showed sequelae of right upper lobe pneumonia and emphysematous changes.  Pulmonary function testing from August 23 confirmed mild airflow obstruction with an FEV1 of 68% predicted prebronchodilator, normal lung volumes and a DLCO of 69% predicted.  Patient was prescribed albuterol which he does feel has been helpful.  No new symptoms.  He is working on tobacco cessation.    Past Medical History:   Diagnosis Date    Arrhythmia     pt unaware    ASTHMA     uses inhaler once a day    Breath shortness     Bronchitis     Cataract  2022    IOL bilat    Coronary artery disease due to lipid rich plaque - CABG     High cholesterol     History of myocardial infarction     unaware of at the time    Pain 09/16/2022    intermittent in chest with exercise    Pneumonia     Postoperative atrial fibrillation (HCC) 2013 in setting of CABG     S/P coronary artery bypass graft x 5     Sleep apnea     no cpap    Snoring     Typical atrial flutter (HCC) sen 9/16/2022        Social History     Socioeconomic History    Marital status:      Spouse name: Kaylee    Number of children: Not on file    Years of education: Not on file    Highest education level: Not on file   Occupational History     Employer: [employer]   Tobacco Use    Smoking status: Some Days     Packs/day: 0.50     Years: 55.00     Pack years: 27.50     Types: Cigarettes     Passive exposure: Past    Smokeless tobacco: Never   Vaping Use    Vaping Use: Never used   Substance and Sexual Activity    Alcohol use: Yes     Comment: 3 beers a week    Drug use: No    Sexual activity: Not on file   Other Topics Concern    Not on file   Social History Narrative    Not on file     Social Determinants of Health     Financial Resource Strain: Not on file   Food Insecurity: Not on file   Transportation Needs: Not on file   Physical Activity: Not on file   Stress: Not on file   Social Connections: Not on file   Intimate Partner Violence: Not on file   Housing Stability: Not on file       Family History   Problem Relation Age of Onset    Heart Disease Neg Hx        Current Outpatient Medications on File Prior to Visit   Medication Sig Dispense Refill    apixaban (ELIQUIS) 5mg Tab Take 1 Tablet by mouth 2 times a day. 180 Tablet 3    rosuvastatin (CRESTOR) 10 MG Tab Take 1 Tablet by mouth every day. 90 Tablet 3    ibuprofen (MOTRIN) 200 MG Tab Take 1 Tablet by mouth every 6 hours as needed for Mild Pain.      HYDROcodone/acetaminophen (NORCO)  MG Tab Take 1 Tablet by mouth every 6 hours as needed for  Severe Pain.      albuterol (VENTOLIN HFA) 108 (90 Base) MCG/ACT Aero Soln inhalation aerosol Inhale 2 Puffs every four hours as needed for Shortness of Breath. 1 Each 11    varenicline (CHANTIX CONTINUING MONTH MAC) 1 MG tablet Take 1 Tablet by mouth 2 times a day. 60 Tablet 3    varenicline (CHANTIX STARTING MONTH PAK) 0.5 MG X 11 & 1 MG X 42 tablet Use as directed 56 Tablet 3     No current facility-administered medications on file prior to visit.       Cat hair extract and Xarelto [rivaroxaban]      ROS:   Review of Systems   Constitutional:  Negative for chills, diaphoresis, fever, malaise/fatigue and weight loss.   HENT:  Negative for congestion, ear discharge, ear pain, hearing loss, nosebleeds, sinus pain, sore throat and tinnitus.    Eyes:  Negative for blurred vision, double vision, photophobia, pain, discharge and redness.   Respiratory:  Negative for cough, hemoptysis, sputum production, shortness of breath, wheezing and stridor.    Cardiovascular:  Negative for chest pain, palpitations, orthopnea, claudication, leg swelling and PND.   Gastrointestinal:  Negative for abdominal pain, constipation, diarrhea, heartburn, nausea and vomiting.   Genitourinary:  Negative for dysuria and urgency.   Musculoskeletal:  Negative for back pain, falls, joint pain, myalgias and neck pain.   Skin:  Negative for itching and rash.   Neurological:  Negative for dizziness, tremors, speech change, focal weakness, weakness and headaches.   Endo/Heme/Allergies:  Negative for environmental allergies.   Psychiatric/Behavioral:  Negative for depression.      /66 (BP Location: Right arm, Patient Position: Sitting, BP Cuff Size: Adult)   Pulse (!) 56   Resp 16   Ht 1.829 m (6')   Wt 82.1 kg (181 lb)   SpO2 95%   Physical Exam  Vitals reviewed.   Constitutional:       General: He is not in acute distress.     Appearance: Normal appearance. He is normal weight.   HENT:      Head: Normocephalic and atraumatic.      Right  Ear: External ear normal.      Left Ear: External ear normal.      Nose: Nose normal. No congestion.      Mouth/Throat:      Mouth: Mucous membranes are moist.      Pharynx: Oropharynx is clear. No oropharyngeal exudate.   Eyes:      General: No scleral icterus.     Extraocular Movements: Extraocular movements intact.      Conjunctiva/sclera: Conjunctivae normal.      Pupils: Pupils are equal, round, and reactive to light.   Cardiovascular:      Rate and Rhythm: Normal rate and regular rhythm.      Heart sounds: Normal heart sounds. No murmur heard.    No gallop.   Pulmonary:      Effort: Pulmonary effort is normal. No respiratory distress.      Breath sounds: Normal breath sounds. No wheezing or rales.   Abdominal:      General: There is no distension.      Palpations: Abdomen is soft.   Musculoskeletal:         General: Normal range of motion.      Cervical back: Normal range of motion and neck supple.      Right lower leg: No edema.      Left lower leg: No edema.   Skin:     General: Skin is warm and dry.      Findings: No rash.   Neurological:      Mental Status: He is alert and oriented to person, place, and time.      Cranial Nerves: No cranial nerve deficit.   Psychiatric:         Mood and Affect: Mood normal.         Behavior: Behavior normal.       PFTs as reviewed by me personally: As per HPI    Imagaing as reviewed by me personally: As per HPI    Assessment:  1. Pulmonary emphysema, unspecified emphysema type (HCC)        2. Pneumonia of right upper lobe due to infectious organism        3. Tobacco use            Plan:  This is a new formal diagnosis and mild to moderate in severity.  He was counseled on tobacco cessation.  We will proceed with a trial of LAMA/LABA combo with Anoro.  Continue albuterol as needed.  Counseled on tobacco cessation.  Up-to-date on vaccines.  Mostly resolved based on most recent CT chest.  Patient was counseled on tobacco cessation.  He is a candidate for lung cancer screening  although we cannot place referral until November of next year when he would be due for an annual CT chest.  Return in about 6 months (around 5/14/2023) for COPD.

## 2022-11-15 ENCOUNTER — TELEPHONE (OUTPATIENT)
Dept: CARDIOLOGY | Facility: MEDICAL CENTER | Age: 72
End: 2022-11-15

## 2022-11-15 ENCOUNTER — OFFICE VISIT (OUTPATIENT)
Dept: CARDIOLOGY | Facility: MEDICAL CENTER | Age: 72
End: 2022-11-15
Attending: INTERNAL MEDICINE
Payer: COMMERCIAL

## 2022-11-15 VITALS
OXYGEN SATURATION: 96 % | DIASTOLIC BLOOD PRESSURE: 76 MMHG | RESPIRATION RATE: 14 BRPM | HEIGHT: 72 IN | HEART RATE: 64 BPM | SYSTOLIC BLOOD PRESSURE: 118 MMHG | WEIGHT: 181 LBS | BODY MASS INDEX: 24.52 KG/M2

## 2022-11-15 DIAGNOSIS — I48.3 TYPICAL ATRIAL FLUTTER (HCC): Chronic | ICD-10-CM

## 2022-11-15 PROCEDURE — 93000 ELECTROCARDIOGRAM COMPLETE: CPT | Performed by: INTERNAL MEDICINE

## 2022-11-15 PROCEDURE — 99204 OFFICE O/P NEW MOD 45 MIN: CPT | Mod: 25 | Performed by: INTERNAL MEDICINE

## 2022-11-15 ASSESSMENT — FIBROSIS 4 INDEX: FIB4 SCORE: 1.37

## 2022-11-15 NOTE — PROGRESS NOTES
Arrhythmia Clinic Note (Established patient)    DOS: 11/15/2022    Chief complaint/Reason for consult: AFL    Interval History: 73 y/o M with CAD s/ CABG in 2013, HTN. Had typical AFL noted at time of LHC and started on OAC. He would like to stop OAC but otherwise not terribly symptomatic to flutter. No chest pain, SOB, or syncope.    ROS (+ highlighted in bold):  Constitutional: Fevers/chills/fatigue/weightloss  HEENT: Blurry vision/eye pain/sore throat/hearing loss  Respiratory: Shortness of breath/cough  Cardiovascular: Chest pain/palpitations/edema/orthopnea/syncope  GI: Nausea/vomitting/diarrhea  MSK: Arthralgias/myagias/muscle weakness  Skin: Rash/sores  Neurological: Numbness/tremors/vertigo  Endocrine: Excessive thirst/polyuria/cold intolerance/heat intolerance  Psych: Depression/anxiety    Past Medical History:   Diagnosis Date    Arrhythmia     pt unaware    ASTHMA     uses inhaler once a day    Breath shortness     Bronchitis     Cataract 2022    IOL bilat    Coronary artery disease due to lipid rich plaque - CABG     High cholesterol     History of myocardial infarction     unaware of at the time    Pain 09/16/2022    intermittent in chest with exercise    Pneumonia     Postoperative atrial fibrillation (HCC) 2013 in setting of CABG     S/P coronary artery bypass graft x 5     Sleep apnea     no cpap    Snoring     Typical atrial flutter (HCC) sen 9/16/2022        Past Surgical History:   Procedure Laterality Date    CATARACT EXTRACTION WITH IOL Bilateral 2022    FISTULA IN ANO REPAIR  2018    MULTIPLE CORONARY ARTERY BYPASS ENDO VEIN HARVEST  12/07/2013    Performed by Mo Vincent M.D. at SURGERY [facility] ORS    CHOLECYSTECTOMY  1996       Social History     Socioeconomic History    Marital status:      Spouse name: Kaylee    Number of children: Not on file    Years of education: Not on file    Highest education level: Not on file   Occupational History     Employer: [employer]   Tobacco Use     Smoking status: Some Days     Packs/day: 0.50     Years: 55.00     Pack years: 27.50     Types: Cigarettes     Passive exposure: Past    Smokeless tobacco: Never   Vaping Use    Vaping Use: Never used   Substance and Sexual Activity    Alcohol use: Yes     Comment: 3 beers a week    Drug use: No    Sexual activity: Not on file   Other Topics Concern    Not on file   Social History Narrative    Not on file     Social Determinants of Health     Financial Resource Strain: Not on file   Food Insecurity: Not on file   Transportation Needs: Not on file   Physical Activity: Not on file   Stress: Not on file   Social Connections: Not on file   Intimate Partner Violence: Not on file   Housing Stability: Not on file       Family History   Problem Relation Age of Onset    Heart Disease Neg Hx        Allergies   Allergen Reactions    Cat Hair Extract Runny Nose and Itching     .    Xarelto [Rivaroxaban]      nausea       Current Outpatient Medications   Medication Sig Dispense Refill    umeclidinium-vilanterol (ANORO ELLIPTA) 62.5-25 MCG/ACT AEROSOL POWDER, BREATH ACTIVATED inhaler Inhale 1 Puff every day. 4 Each 0    apixaban (ELIQUIS) 5mg Tab Take 1 Tablet by mouth 2 times a day. 180 Tablet 3    varenicline (CHANTIX CONTINUING MONTH MAC) 1 MG tablet Take 1 Tablet by mouth 2 times a day. 60 Tablet 3    varenicline (CHANTIX STARTING MONTH MAC) 0.5 MG X 11 & 1 MG X 42 tablet Use as directed 56 Tablet 3    rosuvastatin (CRESTOR) 10 MG Tab Take 1 Tablet by mouth every day. 90 Tablet 3    ibuprofen (MOTRIN) 200 MG Tab Take 1 Tablet by mouth every 6 hours as needed for Mild Pain.      HYDROcodone/acetaminophen (NORCO)  MG Tab Take 1 Tablet by mouth every 6 hours as needed for Severe Pain.      albuterol (VENTOLIN HFA) 108 (90 Base) MCG/ACT Aero Soln inhalation aerosol Inhale 2 Puffs every four hours as needed for Shortness of Breath. 1 Each 11     No current facility-administered medications for this visit.       Physical  Exam:  Vitals:    11/15/22 0731   BP: 118/76   BP Location: Left arm   Patient Position: Sitting   BP Cuff Size: Adult   Pulse: 64   Resp: 14   SpO2: 96%   Weight: 82.1 kg (181 lb)   Height: 1.829 m (6')     General appearance: NAD, conversant   Eyes: anicteric sclerae, moist conjunctivae; no lid-lag; PERRLA  HENT: Atraumatic; oropharynx clear with moist mucous membranes and no mucosal ulcerations; normal hard and soft palate  Neck: Trachea midline; FROM, supple, no thyromegaly or lymphadenopathy  Lungs: CTA, with normal respiratory effort and no intercostal retractions  CV: RRR, no MRGs, no JVD  Abdomen: Soft, non-tender; no masses or HSM  Extremities: No peripheral edema or extremity lymphadenopathy  Skin: Normal temperature, turgor and texture; no rash, ulcers or subcutaneous nodules  Psych: Appropriate affect, alert and oriented to person, place and time    Data:  Lipids:   Lab Results   Component Value Date/Time    CHOLSTRLTOT 148 08/29/2022 06:47 AM    TRIGLYCERIDE 81 08/29/2022 06:47 AM    HDL 52 08/29/2022 06:47 AM    LDL 80 08/29/2022 06:47 AM        BMP:  Lab Results   Component Value Date/Time    SODIUM 139 09/16/2022 1533    POTASSIUM 4.6 09/16/2022 1533    CHLORIDE 103 09/16/2022 1533    CO2 28 09/16/2022 1533    GLUCOSE 77 09/16/2022 1533    BUN 18 09/16/2022 1533    CREATININE 0.94 09/16/2022 1533    CALCIUM 9.9 09/16/2022 1533    ANION 8.0 09/16/2022 1533        TSH:   Lab Results   Component Value Date/Time    TSHULTRASEN 1.050 11/10/2020 1439        THYROXINE (T4):   No results found for: JAVED     CBC:   Lab Results   Component Value Date/Time    WBC 10.5 09/16/2022 03:33 PM    RBC 4.96 09/16/2022 03:33 PM    HEMOGLOBIN 16.1 09/16/2022 03:33 PM    HEMATOCRIT 46.9 09/16/2022 03:33 PM    MCV 94.6 09/16/2022 03:33 PM    MCH 32.5 09/16/2022 03:33 PM    MCHC 34.3 09/16/2022 03:33 PM    RDW 49.7 09/16/2022 03:33 PM    PLATELETCT 240 09/16/2022 03:33 PM    MPV 8.9 (L) 09/16/2022 03:33 PM    NEUTSPOLYS  70.30 11/10/2020 02:39 PM    LYMPHOCYTES 18.80 (L) 11/10/2020 02:39 PM    MONOCYTES 8.40 11/10/2020 02:39 PM    EOSINOPHILS 1.50 11/10/2020 02:39 PM    BASOPHILS 0.60 11/10/2020 02:39 PM    IMMGRAN 0.40 11/10/2020 02:39 PM    NRBC 0.00 11/10/2020 02:39 PM    NEUTS 9.48 (H) 11/10/2020 02:39 PM    LYMPHS 2.54 11/10/2020 02:39 PM    MONOS 1.13 (H) 11/10/2020 02:39 PM    EOS 0.20 11/10/2020 02:39 PM    BASO 0.08 11/10/2020 02:39 PM    IMMGRANAB 0.05 11/10/2020 02:39 PM    NRBCAB 0.00 11/10/2020 02:39 PM        CBC w/o DIFF  Lab Results   Component Value Date/Time    WBC 10.5 09/16/2022 03:33 PM    RBC 4.96 09/16/2022 03:33 PM    HEMOGLOBIN 16.1 09/16/2022 03:33 PM    MCV 94.6 09/16/2022 03:33 PM    MCH 32.5 09/16/2022 03:33 PM    MCHC 34.3 09/16/2022 03:33 PM    RDW 49.7 09/16/2022 03:33 PM    MPV 8.9 (L) 09/16/2022 03:33 PM       Prior echo/stress reviewed: EF 50%    Prior cath reviewed: Patent grafts    EKG interpreted by me: Normal rhythm, compated to ECG 9/19 AFL no longer present    Impression/Plan:  1. Typical atrial flutter (HCC) seen 9/16/2022  EKG        AFL, typical  CAD s/p CABG  ICM EF 50%  HTN    - We discussed treatment of atrial flutter. Recommend OAC for CVA ppx however if pt would like to discontinue it is reasonable to perform EPS/RFA of typical flutter and implant ILR to screen for AF/AFL given elevated CVA risk ([id number])  - Risks include vascular access bleeding or pain, infection, stroke, myocardial infarction, cardiac tamponade, pericardial effusion, myocardial perforation, major bleeding, phrenic nerve damage, heart block requiring a pacemaker, and death. Risk of major adverse event is ~1%. Success rates long term are 80-95% depending on the arrhythmia induced and the acute success in the EP lab.    Plan CTI ablation and ILR implant    Catrachito Ferrell MD  Cardiac Electrophysiology

## 2022-11-15 NOTE — TELEPHONE ENCOUNTER
Patient scheduled for flutter ablation w/anesthesia w/ILR on 12-7-22 with Dr. Ferrell. Patient has been instructed to check in at 6:00 for 7:30 case time. Message sent to authorizations. Emailed Garcia Urbina with [employer] notified.

## 2022-11-15 NOTE — TELEPHONE ENCOUNTER
----- Message from Catrachito Ferrell M.D. sent at 11/15/2022 10:21 AM PST -----  Please schedule AFL ablation and ILR implant, no EDWAR no meds to hold, thanks

## 2022-12-05 ENCOUNTER — PRE-ADMISSION TESTING (OUTPATIENT)
Dept: ADMISSIONS | Facility: MEDICAL CENTER | Age: 72
End: 2022-12-05
Attending: INTERNAL MEDICINE
Payer: COMMERCIAL

## 2022-12-05 DIAGNOSIS — Z01.812 PRE-OPERATIVE LABORATORY EXAMINATION: ICD-10-CM

## 2022-12-05 DIAGNOSIS — Z01.810 PRE-OPERATIVE CARDIOVASCULAR EXAMINATION: ICD-10-CM

## 2022-12-05 LAB
ALBUMIN SERPL BCP-MCNC: 4.3 G/DL (ref 3.2–4.9)
ALBUMIN/GLOB SERPL: 1.2 G/DL
ALP SERPL-CCNC: 67 U/L (ref 30–99)
ALT SERPL-CCNC: 13 U/L (ref 2–50)
ANION GAP SERPL CALC-SCNC: 9 MMOL/L (ref 7–16)
AST SERPL-CCNC: 21 U/L (ref 12–45)
BASOPHILS # BLD AUTO: 0.7 % (ref 0–1.8)
BASOPHILS # BLD: 0.07 K/UL (ref 0–0.12)
BILIRUB SERPL-MCNC: 0.6 MG/DL (ref 0.1–1.5)
BUN SERPL-MCNC: 17 MG/DL (ref 8–22)
CALCIUM SERPL-MCNC: 10.4 MG/DL (ref 8.5–10.5)
CHLORIDE SERPL-SCNC: 103 MMOL/L (ref 96–112)
CO2 SERPL-SCNC: 26 MMOL/L (ref 20–33)
CREAT SERPL-MCNC: 1.01 MG/DL (ref 0.5–1.4)
EKG IMPRESSION: NORMAL
EOSINOPHIL # BLD AUTO: 0.34 K/UL (ref 0–0.51)
EOSINOPHIL NFR BLD: 3.2 % (ref 0–6.9)
ERYTHROCYTE [DISTWIDTH] IN BLOOD BY AUTOMATED COUNT: 50.5 FL (ref 35.9–50)
GFR SERPLBLD CREATININE-BSD FMLA CKD-EPI: 79 ML/MIN/1.73 M 2
GLOBULIN SER CALC-MCNC: 3.5 G/DL (ref 1.9–3.5)
GLUCOSE SERPL-MCNC: 77 MG/DL (ref 65–99)
HCT VFR BLD AUTO: 50.9 % (ref 42–52)
HGB BLD-MCNC: 17 G/DL (ref 14–18)
IMM GRANULOCYTES # BLD AUTO: 0.05 K/UL (ref 0–0.11)
IMM GRANULOCYTES NFR BLD AUTO: 0.5 % (ref 0–0.9)
INR PPP: 1.19 (ref 0.87–1.13)
LYMPHOCYTES # BLD AUTO: 2.82 K/UL (ref 1–4.8)
LYMPHOCYTES NFR BLD: 26.2 % (ref 22–41)
MCH RBC QN AUTO: 32.2 PG (ref 27–33)
MCHC RBC AUTO-ENTMCNC: 33.4 G/DL (ref 33.7–35.3)
MCV RBC AUTO: 96.4 FL (ref 81.4–97.8)
MONOCYTES # BLD AUTO: 1 K/UL (ref 0–0.85)
MONOCYTES NFR BLD AUTO: 9.3 % (ref 0–13.4)
NEUTROPHILS # BLD AUTO: 6.47 K/UL (ref 1.82–7.42)
NEUTROPHILS NFR BLD: 60.1 % (ref 44–72)
NRBC # BLD AUTO: 0 K/UL
NRBC BLD-RTO: 0 /100 WBC
PLATELET # BLD AUTO: 262 K/UL (ref 164–446)
PMV BLD AUTO: 9.6 FL (ref 9–12.9)
POTASSIUM SERPL-SCNC: 4.9 MMOL/L (ref 3.6–5.5)
PROT SERPL-MCNC: 7.8 G/DL (ref 6–8.2)
PROTHROMBIN TIME: 14.9 SEC (ref 12–14.6)
RBC # BLD AUTO: 5.28 M/UL (ref 4.7–6.1)
SODIUM SERPL-SCNC: 138 MMOL/L (ref 135–145)
WBC # BLD AUTO: 10.8 K/UL (ref 4.8–10.8)

## 2022-12-05 PROCEDURE — 93005 ELECTROCARDIOGRAM TRACING: CPT

## 2022-12-05 PROCEDURE — 85610 PROTHROMBIN TIME: CPT

## 2022-12-05 PROCEDURE — 85025 COMPLETE CBC W/AUTO DIFF WBC: CPT

## 2022-12-05 PROCEDURE — 93010 ELECTROCARDIOGRAM REPORT: CPT | Performed by: INTERNAL MEDICINE

## 2022-12-05 PROCEDURE — 36415 COLL VENOUS BLD VENIPUNCTURE: CPT

## 2022-12-05 PROCEDURE — 80053 COMPREHEN METABOLIC PANEL: CPT

## 2022-12-05 ASSESSMENT — FIBROSIS 4 INDEX: FIB4 SCORE: 1.37

## 2022-12-07 ENCOUNTER — HOSPITAL ENCOUNTER (OUTPATIENT)
Facility: MEDICAL CENTER | Age: 72
End: 2022-12-07
Attending: INTERNAL MEDICINE | Admitting: INTERNAL MEDICINE
Payer: COMMERCIAL

## 2022-12-07 ENCOUNTER — ANESTHESIA (OUTPATIENT)
Dept: CARDIOLOGY | Facility: MEDICAL CENTER | Age: 72
End: 2022-12-07
Payer: COMMERCIAL

## 2022-12-07 ENCOUNTER — APPOINTMENT (OUTPATIENT)
Dept: CARDIOLOGY | Facility: MEDICAL CENTER | Age: 72
End: 2022-12-07
Attending: INTERNAL MEDICINE
Payer: COMMERCIAL

## 2022-12-07 ENCOUNTER — ANESTHESIA EVENT (OUTPATIENT)
Dept: CARDIOLOGY | Facility: MEDICAL CENTER | Age: 72
End: 2022-12-07
Payer: COMMERCIAL

## 2022-12-07 VITALS
WEIGHT: 180.12 LBS | OXYGEN SATURATION: 91 % | SYSTOLIC BLOOD PRESSURE: 113 MMHG | BODY MASS INDEX: 24.4 KG/M2 | TEMPERATURE: 96.8 F | RESPIRATION RATE: 20 BRPM | HEIGHT: 72 IN | DIASTOLIC BLOOD PRESSURE: 60 MMHG | HEART RATE: 76 BPM

## 2022-12-07 DIAGNOSIS — I48.3 TYPICAL ATRIAL FLUTTER (HCC): Chronic | ICD-10-CM

## 2022-12-07 LAB
EKG IMPRESSION: NORMAL
INR PPP: 1.15 (ref 0.87–1.13)
PROTHROMBIN TIME: 14.6 SEC (ref 12–14.6)

## 2022-12-07 PROCEDURE — 93653 COMPRE EP EVAL TX SVT: CPT | Performed by: INTERNAL MEDICINE

## 2022-12-07 PROCEDURE — 33285 INSJ SUBQ CAR RHYTHM MNTR: CPT | Performed by: INTERNAL MEDICINE

## 2022-12-07 PROCEDURE — 160035 HCHG PACU - 1ST 60 MINS PHASE I

## 2022-12-07 PROCEDURE — 99100 ANES PT EXTEME AGE<1 YR&>70: CPT | Performed by: INTERNAL MEDICINE

## 2022-12-07 PROCEDURE — 700101 HCHG RX REV CODE 250

## 2022-12-07 PROCEDURE — 700111 HCHG RX REV CODE 636 W/ 250 OVERRIDE (IP): Performed by: INTERNAL MEDICINE

## 2022-12-07 PROCEDURE — 160046 HCHG PACU - 1ST 60 MINS PHASE II

## 2022-12-07 PROCEDURE — 700105 HCHG RX REV CODE 258: Performed by: INTERNAL MEDICINE

## 2022-12-07 PROCEDURE — 700111 HCHG RX REV CODE 636 W/ 250 OVERRIDE (IP)

## 2022-12-07 PROCEDURE — 93662 INTRACARDIAC ECG (ICE): CPT

## 2022-12-07 PROCEDURE — 93005 ELECTROCARDIOGRAM TRACING: CPT | Performed by: INTERNAL MEDICINE

## 2022-12-07 PROCEDURE — 160036 HCHG PACU - EA ADDL 30 MINS PHASE I

## 2022-12-07 PROCEDURE — 160002 HCHG RECOVERY MINUTES (STAT)

## 2022-12-07 PROCEDURE — 93662 INTRACARDIAC ECG (ICE): CPT | Mod: 26 | Performed by: INTERNAL MEDICINE

## 2022-12-07 PROCEDURE — 93623 PRGRMD STIMJ&PACG IV RX NFS: CPT | Mod: 26 | Performed by: INTERNAL MEDICINE

## 2022-12-07 PROCEDURE — 93010 ELECTROCARDIOGRAM REPORT: CPT | Mod: 59 | Performed by: INTERNAL MEDICINE

## 2022-12-07 PROCEDURE — 85610 PROTHROMBIN TIME: CPT

## 2022-12-07 PROCEDURE — 00537 ANES CARDIAC EP PROCEDURES: CPT | Performed by: INTERNAL MEDICINE

## 2022-12-07 PROCEDURE — 700101 HCHG RX REV CODE 250: Performed by: INTERNAL MEDICINE

## 2022-12-07 PROCEDURE — C1764 EVENT RECORDER, CARDIAC: HCPCS

## 2022-12-07 RX ORDER — BUPIVACAINE HYDROCHLORIDE 2.5 MG/ML
INJECTION, SOLUTION EPIDURAL; INFILTRATION; INTRACAUDAL
Status: COMPLETED
Start: 2022-12-07 | End: 2022-12-07

## 2022-12-07 RX ORDER — DIPHENHYDRAMINE HYDROCHLORIDE 50 MG/ML
12.5 INJECTION INTRAMUSCULAR; INTRAVENOUS
Status: DISCONTINUED | OUTPATIENT
Start: 2022-12-07 | End: 2022-12-07 | Stop reason: HOSPADM

## 2022-12-07 RX ORDER — HEPARIN SODIUM 200 [USP'U]/100ML
INJECTION, SOLUTION INTRAVENOUS
Status: COMPLETED
Start: 2022-12-07 | End: 2022-12-07

## 2022-12-07 RX ORDER — LIDOCAINE HYDROCHLORIDE 20 MG/ML
INJECTION, SOLUTION INFILTRATION; PERINEURAL
Status: COMPLETED
Start: 2022-12-07 | End: 2022-12-07

## 2022-12-07 RX ORDER — ONDANSETRON 2 MG/ML
4 INJECTION INTRAMUSCULAR; INTRAVENOUS
Status: DISCONTINUED | OUTPATIENT
Start: 2022-12-07 | End: 2022-12-07 | Stop reason: HOSPADM

## 2022-12-07 RX ORDER — LIDOCAINE HYDROCHLORIDE 20 MG/ML
INJECTION, SOLUTION EPIDURAL; INFILTRATION; INTRACAUDAL; PERINEURAL PRN
Status: DISCONTINUED | OUTPATIENT
Start: 2022-12-07 | End: 2022-12-07 | Stop reason: SURG

## 2022-12-07 RX ORDER — OXYCODONE HCL 5 MG/5 ML
10 SOLUTION, ORAL ORAL
Status: DISCONTINUED | OUTPATIENT
Start: 2022-12-07 | End: 2022-12-07 | Stop reason: HOSPADM

## 2022-12-07 RX ORDER — HYDRALAZINE HYDROCHLORIDE 20 MG/ML
5 INJECTION INTRAMUSCULAR; INTRAVENOUS
Status: DISCONTINUED | OUTPATIENT
Start: 2022-12-07 | End: 2022-12-07 | Stop reason: HOSPADM

## 2022-12-07 RX ORDER — PHENYLEPHRINE HCL IN 0.9% NACL 0.5 MG/5ML
SYRINGE (ML) INTRAVENOUS
Status: DISCONTINUED
Start: 2022-12-07 | End: 2022-12-07 | Stop reason: HOSPADM

## 2022-12-07 RX ORDER — OXYCODONE HCL 5 MG/5 ML
5 SOLUTION, ORAL ORAL
Status: DISCONTINUED | OUTPATIENT
Start: 2022-12-07 | End: 2022-12-07 | Stop reason: HOSPADM

## 2022-12-07 RX ORDER — LABETALOL HYDROCHLORIDE 5 MG/ML
5 INJECTION, SOLUTION INTRAVENOUS
Status: DISCONTINUED | OUTPATIENT
Start: 2022-12-07 | End: 2022-12-07 | Stop reason: HOSPADM

## 2022-12-07 RX ORDER — SODIUM CHLORIDE, SODIUM LACTATE, POTASSIUM CHLORIDE, CALCIUM CHLORIDE 600; 310; 30; 20 MG/100ML; MG/100ML; MG/100ML; MG/100ML
INJECTION, SOLUTION INTRAVENOUS CONTINUOUS
Status: ACTIVE | OUTPATIENT
Start: 2022-12-07 | End: 2022-12-07

## 2022-12-07 RX ORDER — PHENYLEPHRINE HYDROCHLORIDE 10 MG/ML
INJECTION, SOLUTION INTRAMUSCULAR; INTRAVENOUS; SUBCUTANEOUS PRN
Status: DISCONTINUED | OUTPATIENT
Start: 2022-12-07 | End: 2022-12-07 | Stop reason: SURG

## 2022-12-07 RX ORDER — ADENOSINE 3 MG/ML
INJECTION, SOLUTION INTRAVENOUS
Status: COMPLETED
Start: 2022-12-07 | End: 2022-12-07

## 2022-12-07 RX ORDER — HYDROMORPHONE HYDROCHLORIDE 1 MG/ML
0.2 INJECTION, SOLUTION INTRAMUSCULAR; INTRAVENOUS; SUBCUTANEOUS
Status: DISCONTINUED | OUTPATIENT
Start: 2022-12-07 | End: 2022-12-07 | Stop reason: HOSPADM

## 2022-12-07 RX ORDER — MEPERIDINE HYDROCHLORIDE 25 MG/ML
12.5 INJECTION INTRAMUSCULAR; INTRAVENOUS; SUBCUTANEOUS
Status: DISCONTINUED | OUTPATIENT
Start: 2022-12-07 | End: 2022-12-07 | Stop reason: HOSPADM

## 2022-12-07 RX ORDER — HYDROMORPHONE HYDROCHLORIDE 1 MG/ML
0.4 INJECTION, SOLUTION INTRAMUSCULAR; INTRAVENOUS; SUBCUTANEOUS
Status: DISCONTINUED | OUTPATIENT
Start: 2022-12-07 | End: 2022-12-07 | Stop reason: HOSPADM

## 2022-12-07 RX ORDER — ONDANSETRON 2 MG/ML
INJECTION INTRAMUSCULAR; INTRAVENOUS PRN
Status: DISCONTINUED | OUTPATIENT
Start: 2022-12-07 | End: 2022-12-07

## 2022-12-07 RX ORDER — HYDROMORPHONE HYDROCHLORIDE 1 MG/ML
0.1 INJECTION, SOLUTION INTRAMUSCULAR; INTRAVENOUS; SUBCUTANEOUS
Status: DISCONTINUED | OUTPATIENT
Start: 2022-12-07 | End: 2022-12-07 | Stop reason: HOSPADM

## 2022-12-07 RX ORDER — ISOPROTERENOL HYDROCHLORIDE 0.2 MG/ML
INJECTION, SOLUTION INTRAVENOUS
Status: COMPLETED
Start: 2022-12-07 | End: 2022-12-07

## 2022-12-07 RX ORDER — HALOPERIDOL 5 MG/ML
1 INJECTION INTRAMUSCULAR
Status: DISCONTINUED | OUTPATIENT
Start: 2022-12-07 | End: 2022-12-07 | Stop reason: HOSPADM

## 2022-12-07 RX ORDER — ONDANSETRON 2 MG/ML
INJECTION INTRAMUSCULAR; INTRAVENOUS PRN
Status: DISCONTINUED | OUTPATIENT
Start: 2022-12-07 | End: 2022-12-07 | Stop reason: SURG

## 2022-12-07 RX ADMIN — FENTANYL CITRATE 50 MCG: 50 INJECTION, SOLUTION INTRAMUSCULAR; INTRAVENOUS at 07:58

## 2022-12-07 RX ADMIN — FENTANYL CITRATE 50 MCG: 50 INJECTION, SOLUTION INTRAMUSCULAR; INTRAVENOUS at 08:38

## 2022-12-07 RX ADMIN — PROPOFOL 50 MG: 10 INJECTION, EMULSION INTRAVENOUS at 08:39

## 2022-12-07 RX ADMIN — LIDOCAINE HYDROCHLORIDE: 20 INJECTION, SOLUTION INFILTRATION; PERINEURAL at 07:50

## 2022-12-07 RX ADMIN — SUGAMMADEX 200 MG: 100 INJECTION, SOLUTION INTRAVENOUS at 08:43

## 2022-12-07 RX ADMIN — EPHEDRINE SULFATE 10 MG: 50 INJECTION INTRAMUSCULAR; INTRAVENOUS; SUBCUTANEOUS at 08:09

## 2022-12-07 RX ADMIN — PHENYLEPHRINE HYDROCHLORIDE 100 MCG: 10 INJECTION INTRAVENOUS at 08:07

## 2022-12-07 RX ADMIN — EPHEDRINE SULFATE 5 MG: 50 INJECTION INTRAMUSCULAR; INTRAVENOUS; SUBCUTANEOUS at 08:42

## 2022-12-07 RX ADMIN — PHENYLEPHRINE HYDROCHLORIDE 100 MCG: 10 INJECTION INTRAVENOUS at 07:58

## 2022-12-07 RX ADMIN — PROPOFOL 150 MG: 10 INJECTION, EMULSION INTRAVENOUS at 07:58

## 2022-12-07 RX ADMIN — SODIUM CHLORIDE, POTASSIUM CHLORIDE, SODIUM LACTATE AND CALCIUM CHLORIDE: 600; 310; 30; 20 INJECTION, SOLUTION INTRAVENOUS at 07:52

## 2022-12-07 RX ADMIN — ADENOSINE 12 MG: 3 INJECTION INTRAVENOUS at 08:33

## 2022-12-07 RX ADMIN — ISOPROTERENOL HYDROCHLORIDE 0.2 MG: 0.2 INJECTION, SOLUTION INTRACARDIAC; INTRAMUSCULAR; INTRAVENOUS; SUBCUTANEOUS at 08:32

## 2022-12-07 RX ADMIN — BUPIVACAINE HYDROCHLORIDE: 2.5 INJECTION, SOLUTION EPIDURAL; INFILTRATION; INTRACAUDAL; PERINEURAL at 07:50

## 2022-12-07 RX ADMIN — HEPARIN SODIUM 4000 UNITS: 200 INJECTION, SOLUTION INTRAVENOUS at 07:50

## 2022-12-07 RX ADMIN — PHENYLEPHRINE HYDROCHLORIDE 100 MCG: 10 INJECTION INTRAVENOUS at 08:02

## 2022-12-07 RX ADMIN — LIDOCAINE HYDROCHLORIDE 80 MG: 20 INJECTION, SOLUTION EPIDURAL; INFILTRATION; INTRACAUDAL at 07:58

## 2022-12-07 RX ADMIN — ONDANSETRON 4 MG: 2 INJECTION INTRAMUSCULAR; INTRAVENOUS at 08:39

## 2022-12-07 RX ADMIN — ROCURONIUM BROMIDE 50 MG: 10 INJECTION, SOLUTION INTRAVENOUS at 07:58

## 2022-12-07 ASSESSMENT — FIBROSIS 4 INDEX: FIB4 SCORE: 1.58

## 2022-12-07 NOTE — OR NURSING
0856: pt arrived to PACU in stable condition. VSS. Dressing to left chest is CDI. Pt right groin site is CDI. Soft to touch and no s/s of hematoma. Pt denies pain and nausea at this time.    0920: pt wife called and updated on pt status and timeline of recovery stay.     1030: Jacklyn TUTTLE at bedside to discuss education with pt. Pt verbalizes understanding.     1050: pt stood at bedside, ambulated to restroom and able to void without issue. Pt groin stable. No s/s of hematoma or bleeding.    1100: Report called to Emilee LOPEZ. Pt ambulated to phase 2 in stable condition.

## 2022-12-07 NOTE — DISCHARGE INSTRUCTIONS
HOME CARE INSTRUCTIONS    ACTIVITY: Rest and take it easy for the first 24 hours.  A responsible adult is recommended to remain with you during that time.  It is normal to feel sleepy.  We encourage you to not do anything that requires balance, judgment or coordination.    FOR 24 HOURS DO NOT:  Drive, operate machinery or run household appliances.  Drink beer or alcoholic beverages.  Make important decisions or sign legal documents.    SPECIAL INSTRUCTIONS: [facility] Post Ablation Patient Instructions:  No lifting > 10 lbs x 1 week.      No soaking in baths, hot tubs, pools x 1 week.  May shower the day after discharge and take off groin dressings and leave  sites uncovered.  Continue to monitor sites daily for warmth, redness, discolored drainage.  It is common to have a small lump in the area where the cather was (usually the size of a marble); this will go away but takes approximately 6 weeks to normalize.     3.   Please take all medications as prescribed to you; please do not stop any medications prescribed post ablation unless directed by your healthcare provider.      4.   Please do not miss any doses of your blood thinner (if you have been started on, or take chronic blood thinners) without discussion with your healthcare provider first.     5.   Please walk and take deep breaths after discharge.  After discharge, if you experience neurological changes/signs of stroke or high fever you should be seen in the emergency dept.     6.   It is possible you may experience some chest discomfort or chest tightness post ablation.  This is usually secondary to inflammation and irritation of the tissues at the area of the ablation.  If this occurs, it is advised to try 400 mg of Ibuprofen with food as needed up to three times a day for a maximum of two days.  This should help to decrease pain and tissue inflammation.          **Please notify the office ([phone]) if this occurs.          ** DO NOT TAKE Ibuprofen IF HISTORY OF ALLERGY, SIGNIFICANT BLEEDING OR KIDNEY DISEASE WITHOUT DISCUSSING WITH YOUR CARDIOLOGY PROVIDER FIRST.          ** If pain becomes severe or you have additional symptoms you may need to be medically evaluated; please contact the cardiology office ([phone]) for further direction.     7. It is possible that you may experience arrhythmia/Atrial Fibrillation post ablation.  This is secondary to irritation and inflammation of the cardiac tissues from the ablation.  If you have atrial fibrillation all day or feel poorly with it, please notify your cardiologist's via phone ([phone]) or [email or web address].      8.  Please contact call our office ([phone]) or message via [employer] message if you have any questions or concerns post procedurally.    9. You need to be seen for post ablation follow up 3-4 weeks post procedure. An appointment is scheduled for you.  Please contact the office ([phone]) if you need to change your appointment.      DIET: To avoid nausea, slowly advance diet as tolerated, avoiding spicy or greasy foods for the first day.  Add more substantial food to your diet according to your physician's instructions.  Babies can be fed formula or breast milk as soon as they are hungry.  INCREASE FLUIDS AND FIBER TO AVOID CONSTIPATION.    MEDICATIONS: Resume taking daily medication.  Take prescribed pain medication with food.  If no medication is prescribed, you may take non-aspirin pain medication if needed.  PAIN MEDICATION CAN BE VERY CONSTIPATING.  Take a stool softener or laxative such as senokot, pericolace, or milk of magnesia if needed.    A follow-up appointment should be arranged with your doctor; call to schedule.    You should CALL YOUR PHYSICIAN if you develop:  Fever greater than 101 degrees F.  Pain not relieved by medication, or persistent nausea or vomiting.  Excessive bleeding (blood soaking through dressing) or unexpected drainage from the  wound.  Extreme redness or swelling around the incision site, drainage of pus or foul smelling drainage.  Inability to urinate or empty your bladder within 8 hours.  Problems with breathing or chest pain.    You should call 911 if you develop problems with breathing or chest pain.  If you are unable to contact your doctor or surgical center, you should go to the nearest emergency room or urgent care center.  Physician's telephone #: [phone]    MILD FLU-LIKE SYMPTOMS ARE NORMAL.  YOU MAY EXPERIENCE GENERALIZED MUSCLE ACHES, THROAT IRRITATION, HEADACHE AND/OR SOME NAUSEA.    If any questions arise, call your doctor.  If your doctor is not available, please feel free to call the Surgical Center at [phone].  The Center is open Monday through Friday from 7AM to 7PM.      A registered nurse may call you a few days after your surgery to see how you are doing after your procedure.    You may also receive a survey in the mail within the next two weeks and we ask that you take a few moments to complete the survey and return it to us.  Our goal is to provide you with very good care and we value your comments.     Depression / Suicide Risk    As you are discharged from this [facility] facility, it is important to learn how to keep safe from harming yourself.    Recognize the warning signs:  Abrupt changes in personality, positive or negative- including increase in energy   Giving away possessions  Change in eating patterns- significant weight changes-  positive or negative  Change in sleeping patterns- unable to sleep or sleeping all the time   Unwillingness or inability to communicate  Depression  Unusual sadness, discouragement and loneliness  Talk of wanting to die  Neglect of personal appearance   Rebelliousness- reckless behavior  Withdrawal from people/activities they love  Confusion- inability to concentrate     If you or a loved one observes any of these behaviors or has concerns about self-harm, here's  what you can do:  Talk about it- your feelings and reasons for harming yourself  Remove any means that you might use to hurt yourself (examples: pills, rope, extension cords, firearm)  Get professional help from the community (Mental Health, Substance Abuse, psychological counseling)  Do not be alone:Call your Safe Contact- someone whom you trust who will be there for you.  Call your local CRISIS HOTLINE 511-6341 or 117-499-6616  Call your local Children's Mobile Crisis Response Team [place] (519) 880-0212 or www.[employer] It  Call the toll free National Suicide Prevention Hotlines   National Suicide Prevention Lifeline 385-938-AVUF (8282)  National Hope Line Network 800-SUICIDE (012-3643)    I acknowledge receipt and understanding of these Home Care instructions.

## 2022-12-07 NOTE — ANESTHESIA PROCEDURE NOTES
Airway    Date/Time: 12/7/2022 7:59 AM  Performed by: Anuj Trinidad M.D.  Authorized by: Anuj Trinidad M.D.     Location:  OR  Urgency:  Elective  Indications for Airway Management:  Anesthesia      Spontaneous Ventilation: absent    Sedation Level:  Deep  Preoxygenated: Yes    Patient Position:  Sniffing  Final Airway Type:  Endotracheal airway  Final Endotracheal Airway:  ETT  Cuffed: Yes    Technique Used for Successful ETT Placement:  Direct laryngoscopy    Insertion Site:  Oral  Blade Type:  Ed  Laryngoscope Blade/Videolaryngoscope Blade Size:  3  ETT Size (mm):  7.0  Measured from:  Teeth  ETT to Teeth (cm):  23  Placement Verified by: auscultation and capnometry    Cormack-Lehane Classification:  Grade I - full view of glottis  Number of Attempts at Approach:  1

## 2022-12-07 NOTE — ANESTHESIA PREPROCEDURE EVALUATION
Date/Time: 12/07/22 0730    Scheduled providers: Catrachito Ferrell M.D.    Procedures:       CL-EP ABLATION ATRIAL FLUTTER      CL-IMPLANTABLE LOOP RECORDER    Diagnosis:       Typical atrial flutter (HCC) [I48.3]      Typical atrial flutter [I48.3]    Indications:       See Associated Dx      See Associated Dx    Location: [facility] [place] [facility] - [facility]          Relevant Problems   CARDIAC   (positive) Coronary artery disease due to lipid rich plaque - CABG   (positive) Postoperative atrial fibrillation (HCC) 2013 in setting of CABG   (positive) Primary hypertension   (positive) Typical atrial flutter (HCC) seen 9/16/2022   (positive) Venous insufficiency of leg       Physical Exam    Airway   Mallampati: II  TM distance: >3 FB  Neck ROM: full       Cardiovascular - normal exam  Rhythm: regular  Rate: normal  (-) murmur     Dental   (+) upper dentures, lower dentures           Pulmonary - normal exam  Breath sounds clear to auscultation     Abdominal    Neurological - normal exam                 Anesthesia Plan    ASA 3   ASA physical status 3 criteria: CAD/stents (> 3 months)    Plan - general       Airway plan will be ETT          Induction: intravenous    Postoperative Plan: Postoperative administration of opioids is intended.    Pertinent diagnostic labs and testing reviewed    Informed Consent:    Anesthetic plan and risks discussed with patient.    Use of blood products discussed with: patient whom consented to blood products.

## 2022-12-07 NOTE — PROGRESS NOTES
Seen in afternoon same day discharge EP rounds.  S/P AFL ablation and ILR placement.      Conclusions per Dr. Ferrell's Op Note 12/7/2022:  Procedure(s) Performed:   Supraventricular tachycardia ablation (atrial flutter ablation)   Electroanatomical 3D mapping with CARTO  CS/LA pacing and recording  Intra-cardiac echocardiography  IV drug infusion for arrhythmia induction     Monitored rhythm NSR.  Vital signs are stable. The right femoral access site is soft with CDI dressing in place.      Close follow up arranged as below:  Future Appointments   Date Time Provider Department Center   12/16/2022 10:00 AM PACER CHECK-[facility] B [facility] None   1/11/2023  3:30 PM MITZI Alatorre [facility] None   5/15/2023  8:50 AM Iris Quintero M.D. [facility] None          Discharge instructions discussed with patient:  [facility] Post Ablation Patient Instructions:  No lifting > 10 lbs x 1 week.    No soaking in baths, hot tubs, pools x 1 week.  May shower the day after discharge and take off groin dressings and leave uncovered.  Continue to monitor sites daily for warmth, redness, discolored drainage.  It is common to have a small lump in the area where the cather was (usually the size of a small marble); this will go away but takes approximately 6 weeks to normalize.   3.   Please take all medications as prescribed to you; please do not stop any medications prescribed post ablation unless directed by your healthcare provider.    4.   Please do not miss any doses of your blood thinner (if you have been started on, or take chronic blood thinners) without discussion with your healthcare provider first.   5.   Please walk and take deep breaths after discharge.  After discharge, if  experiences neurological changes/signs of stroke, high fever, you should be seen in the emergency dept.   6.   It is possible you may experience some chest discomfort or chest tightness post ablation.  This is usually secondary to  inflammation and irritation of the tissues at the area of the ablation.  If this occurs, it is advised to try 400 mg of Ibuprofen with food as needed up to three times a day for a maximum of two days.  This should help to decrease pain and tissue inflammation.          **Please notify the office ([phone]) if this occurs.         ** DO NOT TAKE Ibuprofen IF HISTORY OF SIGNIFICANT BLEEDING OR KIDNEY DISEASE WITHOUT DISCUSSING WITH YOUR CARDIOLOGY PROVIDER FIRST.          ** If pain becomes severe or you have additional symptoms you may need to be medically evaluated; please contact the cardiology office ([phone]) for further direction.   7. It is possible that you may experience arrhythmia/Atrial Fibrillation post ablation.  This is secondary to irritation and inflammation of the cardiac tissues from the ablation.  If you have atrial fibrillation all day or feel poorly with it, please notify your cardiologist's via phone ([phone]) or [employer].    8.  Please contact call our office ([phone]) or message via [email or web address] message if you have any questions or concerns post procedurally.  9. You need to be seen for post ablation follow up 2-4 weeks post procedure.  If you do not have a follow up appointment scheduled, please call [phone] to schedule your follow up appointment.

## 2022-12-07 NOTE — OP REPORT
Electrophysiology Procedure Note  [facility]    Procedure(s) Performed:   Supraventricular tachycardia ablation (atrial flutter ablation)   Electroanatomical 3D mapping with CARTO  CS/LA pacing and recording  Intra-cardiac echocardiography  IV drug infusion for arrhythmia induction    Indication: Atrial flutter    : Catrachito Ferrell M.D.    Anesthesia: General anesthesia    Anesthesiologist: Dr Trinidad    Specimen(s) Removed: None    Estimated Blood Loss:  20cc    Complications: None    Pre-procedure ECG: SR    Post Procedure ECG: SR    Description of Procedure:    After informed written consent, the patient was brought to the EP lab in the fasting, non-sedated state. General anaesthesia given to the patient. The patient was prepped and draped in the usual sterile fashion. Femoral venous access was obtained using the modified Seldinger technique. In the right femoral vein, 3 sheaths (8,8,8 Fr) were inserted over 0.35” guidewires. A deflectable decapolar catheter was advanced to the CS position. One 8Fr saline irrigated ablation catheter with 3.5mm distal electrode and location sensor for the CARTO system (Bios[employer] ST SF FJ) was inserted into an 8Fr sheath for electroanatomical 3D mapping and radiofrequency ablation. An 8F ICE catheter was advanced to the right atrium for catheter contact visualization and effusion monitoring during the case.      At the end of the procedure, the catheter and sheaths were removed, and hemostasis was achieved by manual compression. Following recovery from anesthesia, the patient was transferred to the PACU in good condition.     Baseline Rhythm: SR     Intervals:  HV  43ms  AVNBCL  370ms      Arrhythmias:  1. Sustained typical atrial flutter. SVT was induced with LA burst pacing from the CS catheter. SVT CL 230ms.     Mapping:  Electroanatomical 3D (CARTO FAM) map of CS and right atrium around the cavotricuspid isthmus was created during flutter. A his  cloud was created. An LAT map was made demonstrating typical atrial flutter.      Ablation:  Radiofrequency energy was applied using 3.5 mm saline irrigated catheter, power 35W, total 4RF applications, RF time 220 seconds to create a cavotricuspid isthmus line between the tricuspid annulus and Eustachian ridge/inferior vena cava to produce bidirectional isthmus conduction block. Flutter terminated during RF application.     Post Ablation Testin. Bidirectional isthmus block >150ms  2. No inducible flutter or SVT despite Isuprel infusion and burst pacing     Fluoroscopy Time: 0 min     Impressions/Plan:   1. Typical CCW  right atrial flutter.  2. Successful catheter mediated ablation of right atrial flutter with bidirectional isthmus block.  3. Non inducible post ablation..  4. Bed rest for 2 hours in monitored recovery.  5. Discontinue oral anticoagulation

## 2022-12-07 NOTE — OP REPORT
PROCEDURE PERFORMED: Implantable Loop Recorder    DATE OF SERVICE: 12/7/2022    : Catrachito Ferrell MD    ASSISTANT: None    ANESTHESIA: Local    EBL: <5 cc    SPECIMENS: None    STATEMENT OF MEDICAL NECESSITY:  Atrial fibrillation    DESCRIPTION OF PROCEDURE:  After informed written consent, the patient was brought to the cath lab pre/post procedure area. The patient was prepped and draped in the usual sterile fashion. The procedure was performed with local anesthetic. Using the supplied incision tool, a <1 cm incision was made in the skin about 2 cm leftward and lateral to the sternal border. Using the supplied insertion tool, a tunnel was made in the subcutaneous tissue at a 45 degree angle to the sternum and the device was inserted with the supplied plunger. Manual compression was used until hemostasis achieved. A single 4-0 vicryl suture was used to appose the skin. Dermabond was applied to the incision site. Sterile dressing was applied.    IMPLANTED DEVICE INFORMATION:  Model: Medtronic LNQ22  Serial number:  [id number]    IMPRESSIONS:  1. Successful implantable loop recorder implantation    RECOMMENDATIONS:  1. Routine follow-up and device interrogation

## 2022-12-10 LAB — EKG IMPRESSION: NORMAL

## 2022-12-13 ENCOUNTER — TELEPHONE (OUTPATIENT)
Dept: CARDIOLOGY | Facility: MEDICAL CENTER | Age: 72
End: 2022-12-13
Payer: COMMERCIAL

## 2022-12-14 NOTE — TELEPHONE ENCOUNTER
FYI--patients device transmitted via home monitor.  Patient had atrial fib/atrial flutter episode 12/9 lasting approximately 1 hour.

## 2022-12-14 NOTE — TELEPHONE ENCOUNTER
Catrachito Ferrell M.D.  You; Helena Faye, [place] Ass't 4 minutes ago (4:22 PM)     OK please let pt know to resume his OAC for now and we will continue to monitor Afib burden through loop recorder   Reviewed recommendations with pt, reordered OAC. Pt to take for now

## 2022-12-16 ENCOUNTER — APPOINTMENT (OUTPATIENT)
Dept: CARDIOLOGY | Facility: MEDICAL CENTER | Age: 72
End: 2022-12-16
Payer: COMMERCIAL

## 2022-12-16 ENCOUNTER — TELEPHONE (OUTPATIENT)
Dept: CARDIOLOGY | Facility: MEDICAL CENTER | Age: 72
End: 2022-12-16

## 2022-12-19 NOTE — TELEPHONE ENCOUNTER
12/9 623pm little over an hour. Discussed with pt need for OAC      Pt to see EA in Feb and discuss coming off oac it no further recurrence

## 2023-01-07 ENCOUNTER — NON-PROVIDER VISIT (OUTPATIENT)
Dept: CARDIOLOGY | Facility: MEDICAL CENTER | Age: 73
End: 2023-01-07
Payer: COMMERCIAL

## 2023-01-07 PROCEDURE — 93298 REM INTERROG DEV EVAL SCRMS: CPT | Performed by: INTERNAL MEDICINE

## 2023-01-09 NOTE — CARDIAC REMOTE MONITOR - SCAN
Device transmission reviewed. Device demonstrated appropriate function.       Electronically Signed by: Catrachito Ferrell M.D.    1/13/2023  10:25 AM

## 2023-02-06 NOTE — PROGRESS NOTES
Chief Complaint   Patient presents with    Atrial Flutter     Fv Dx: Typical atrial flutter       Subjective     Sebastian Gomez is a 73 y.o. male who presents today for procedural follow up after abaltion of atrial flutter 12/7/22 with Dr. Ferrell.  Additionally had ILR implanted to screen for AF post ablation.     He is followed by Dr Ferrell for EP and Dr Stephenson for cardiology.  Additional medical history significant for CAD S/P CABG in 2013, cath 9/19/22 with with patent LIMA, VG to RCA, VG to OM1/OM2, tiny occluded Diag segment, HTN, noted to have atrial flutter at time of heart cath.  Seen by Dr. Ferrell for consideration for Flutter ablation.     Post procedurally ILR transmitted AF event lasting approximately I hours about one week post ablation.  Continued ILR surveillance reveals episodes of paroxysmal AF.      Today in follow up he reports that he is overall feeling well, no symptoms of arrhythmia.  He historically has not been able to know when he has had arrhythmia.  He reports has been taking Eliquis as prescribed, however does not like how he feels on the medication.  He ideally would like to be able to come off of Eliquis in the future.    Past Medical History:   Diagnosis Date    Arrhythmia     pt unaware    ASTHMA     uses inhaler once a day    Breath shortness     Bronchitis     Cataract 2022    IOL bilat    Coronary artery disease due to lipid rich plaque - CABG     High cholesterol     History of myocardial infarction     unaware of at the time    Pain 09/16/2022    intermittent in chest with exercise    Pneumonia     Postoperative atrial fibrillation (HCC) 2013 in setting of CABG     S/P coronary artery bypass graft x 5     Sleep apnea     no cpap    Snoring     Typical atrial flutter (HCC) sen 9/16/2022      Past Surgical History:   Procedure Laterality Date    CATARACT EXTRACTION WITH IOL Bilateral 2022    FISTULA IN ANO REPAIR  2018    MULTIPLE CORONARY ARTERY BYPASS ENDO VEIN HARVEST   12/07/2013    Performed by Mo Vincent M.D. at SURGERY [facility] ORS    CHOLECYSTECTOMY  1996     Family History   Problem Relation Age of Onset    Heart Disease Neg Hx      Social History     Socioeconomic History    Marital status:      Spouse name: Kaylee    Number of children: Not on file    Years of education: Not on file    Highest education level: Not on file   Occupational History     Employer: [employer]   Tobacco Use    Smoking status: Some Days     Packs/day: 0.50     Years: 55.00     Pack years: 27.50     Types: Cigarettes     Passive exposure: Past    Smokeless tobacco: Never   Vaping Use    Vaping Use: Never used   Substance and Sexual Activity    Alcohol use: Yes     Comment: 3 beers a week    Drug use: No    Sexual activity: Not on file   Other Topics Concern    Not on file   Social History Narrative    Not on file     Social Determinants of Health     Financial Resource Strain: Not on file   Food Insecurity: Not on file   Transportation Needs: Not on file   Physical Activity: Not on file   Stress: Not on file   Social Connections: Not on file   Intimate Partner Violence: Not on file   Housing Stability: Not on file     Allergies   Allergen Reactions    Cat Hair Extract Runny Nose and Itching     .    Xarelto [Rivaroxaban]      nausea     Outpatient Encounter Medications as of 2/7/2023   Medication Sig Dispense Refill    apixaban (ELIQUIS) 5mg Tab Take 1 Tablet by mouth 2 times a day. 180 Tablet 3    umeclidinium-vilanterol (ANORO ELLIPTA) 62.5-25 MCG/ACT AEROSOL POWDER, BREATH ACTIVATED inhaler Inhale 1 Puff every day. 4 Each 0    varenicline (CHANTIX CONTINUING MONTH PAK) 1 MG tablet Take 1 Tablet by mouth 2 times a day. 60 Tablet 3    varenicline (CHANTIX STARTING MONTH PAK) 0.5 MG X 11 & 1 MG X 42 tablet Use as directed 56 Tablet 3    rosuvastatin (CRESTOR) 10 MG Tab Take 1 Tablet by mouth every day. 90 Tablet 3    ibuprofen (MOTRIN) 200 MG Tab Take 1 Tablet by mouth every 6 hours as  needed for Mild Pain.      HYDROcodone/acetaminophen (NORCO)  MG Tab Take 1 Tablet by mouth every 6 hours as needed for Severe Pain.       No facility-administered encounter medications on file as of 2/7/2023.     Review of Systems   Constitutional:  Negative for chills, fever, malaise/fatigue and weight loss.   HENT:  Negative for nosebleeds and tinnitus.    Respiratory:  Negative for cough, hemoptysis, sputum production, shortness of breath and wheezing.    Cardiovascular:  Negative for chest pain, palpitations, orthopnea, leg swelling and PND.   Gastrointestinal:  Negative for blood in stool, heartburn, nausea and vomiting.   Genitourinary:  Negative for hematuria.   Musculoskeletal: Negative.    Skin:  Negative for rash.   Neurological:  Negative for dizziness, tingling, tremors, sensory change, speech change, focal weakness, loss of consciousness and headaches.   Psychiatric/Behavioral: Negative.              Objective     /70 (BP Location: Left arm, Patient Position: Sitting, BP Cuff Size: Adult)   Pulse (!) 54   Resp 14   Ht 1.829 m (6')   Wt 82.1 kg (181 lb)   SpO2 95%   BMI 24.55 kg/m²     Physical Exam  Vitals reviewed.   Constitutional:       Appearance: Normal appearance. He is well-developed.   HENT:      Head: Normocephalic and atraumatic.      Mouth/Throat:      Mouth: Mucous membranes are moist.      Pharynx: Oropharynx is clear.   Eyes:      Extraocular Movements: Extraocular movements intact.      Conjunctiva/sclera: Conjunctivae normal.      Pupils: Pupils are equal, round, and reactive to light.   Neck:      Vascular: No JVD.   Cardiovascular:      Rate and Rhythm: Normal rate and regular rhythm.      Pulses: Normal pulses.      Heart sounds: Normal heart sounds. No murmur heard.    No friction rub. No gallop.   Pulmonary:      Effort: Pulmonary effort is normal.      Breath sounds: Normal breath sounds. No wheezing or rales.   Chest:      Chest wall: No tenderness.  "  Musculoskeletal:         General: Normal range of motion.      Cervical back: Normal range of motion and neck supple.      Right lower leg: No edema.      Left lower leg: No edema.   Skin:     General: Skin is warm and dry.      Capillary Refill: Capillary refill takes 2 to 3 seconds.   Neurological:      Mental Status: He is alert and oriented to person, place, and time.   Psychiatric:         Mood and Affect: Mood normal.         Behavior: Behavior normal.         Thought Content: Thought content normal.         Judgment: Judgment normal.       EKG 2/7/2023: Sinus rhythm           Assessment & Plan     1. Typical atrial flutter (HCC)  EKG      2. Status post ablation of atrial flutter        3. Postoperative atrial fibrillation (HCC) 2013 in setting of CABG        4. Status post placement of implantable loop recorder        5. Coronary artery disease due to lipid rich plaque - CABG        6. Circulating anticoagulants (HCC)        7. Tobacco dependence            Medical Decision Making: Today's Assessment/Status/Plan:   1.  Typical atrial flutter  2.  Status post flutter ablation  - No clinical recurrence of atrial flutter, he is post ablation on 12/7/2022.    3.  Paroxysmal atrial fibrillation  4.  ILR  - ILR implanted post flutter ablation to screen for suspected atrial fibrillation.  - Noted episodes of AF on ILR.  Current burden estimated to be approximately 1%.  He is asymptomatic to this, however states he does not like how he feels on anticoagulation.  Reports that he is somewhat sensitive to medications.  -Discussed given low burden of AF and overall asymptomatic would not start antiarrhythmic medications at this time.  He is interested in getting his atrial fibrillation controlled and \" taken care of\" so it does not become a larger problem in the future.  Additionally his goal is to not be on anticoagulation long-term.  -We will have him meet back with Dr. Ferrell to discuss option of potential AF " ablation.  -In the meantime he will continue Eliquis 5 mg twice daily.    5.  CAD, status post 5V CABG  -No reports of angina/chest pain.  -On rosuvastatin 10 mg daily.  -Continues to smoke approximately 0.5 packs/day.  We spent approximately 5 minutes discussing tobacco cessation.  He is precontemplation stage, but aware that he needs to quit.    6.  Anticoagulation  - [id number] score of 2 (CAD, age).  He reports no history of HTN as listed in his medical record currently not on treatment for HTN.  -She reports tolerating Eliquis well without evidence of bleeding.  See discussion #3.    Return to clinic in 3 to 4 weeks for review with Dr. Ferrell.    PLEASE NOTE: This Note was created using voice recognition Software. I have made every reasonable attempt to correct obvious errors, but I expect that there are errors of grammar and possibly content that I did not discover before finalizing the note

## 2023-02-07 ENCOUNTER — OFFICE VISIT (OUTPATIENT)
Dept: CARDIOLOGY | Facility: MEDICAL CENTER | Age: 73
End: 2023-02-07
Payer: COMMERCIAL

## 2023-02-07 ENCOUNTER — NON-PROVIDER VISIT (OUTPATIENT)
Dept: CARDIOLOGY | Facility: MEDICAL CENTER | Age: 73
End: 2023-02-07

## 2023-02-07 VITALS
DIASTOLIC BLOOD PRESSURE: 70 MMHG | SYSTOLIC BLOOD PRESSURE: 126 MMHG | OXYGEN SATURATION: 95 % | HEART RATE: 54 BPM | RESPIRATION RATE: 14 BRPM | BODY MASS INDEX: 24.52 KG/M2 | WEIGHT: 181 LBS | HEIGHT: 72 IN

## 2023-02-07 DIAGNOSIS — I25.83 CORONARY ARTERY DISEASE DUE TO LIPID RICH PLAQUE: Chronic | ICD-10-CM

## 2023-02-07 DIAGNOSIS — F17.200 TOBACCO DEPENDENCE: ICD-10-CM

## 2023-02-07 DIAGNOSIS — Z95.818 STATUS POST PLACEMENT OF IMPLANTABLE LOOP RECORDER: ICD-10-CM

## 2023-02-07 DIAGNOSIS — Z86.79 STATUS POST ABLATION OF ATRIAL FLUTTER: ICD-10-CM

## 2023-02-07 DIAGNOSIS — D68.318 CIRCULATING ANTICOAGULANTS (HCC): ICD-10-CM

## 2023-02-07 DIAGNOSIS — I48.91 POSTOPERATIVE ATRIAL FIBRILLATION (HCC): Chronic | ICD-10-CM

## 2023-02-07 DIAGNOSIS — I97.89 POSTOPERATIVE ATRIAL FIBRILLATION (HCC): Chronic | ICD-10-CM

## 2023-02-07 DIAGNOSIS — I48.3 TYPICAL ATRIAL FLUTTER (HCC): ICD-10-CM

## 2023-02-07 DIAGNOSIS — Z98.890 STATUS POST ABLATION OF ATRIAL FLUTTER: ICD-10-CM

## 2023-02-07 DIAGNOSIS — I25.10 CORONARY ARTERY DISEASE DUE TO LIPID RICH PLAQUE: Chronic | ICD-10-CM

## 2023-02-07 PROCEDURE — 99214 OFFICE O/P EST MOD 30 MIN: CPT | Performed by: NURSE PRACTITIONER

## 2023-02-07 PROCEDURE — 93000 ELECTROCARDIOGRAM COMPLETE: CPT | Performed by: INTERNAL MEDICINE

## 2023-02-07 PROCEDURE — 93298 REM INTERROG DEV EVAL SCRMS: CPT | Performed by: INTERNAL MEDICINE

## 2023-02-07 ASSESSMENT — ENCOUNTER SYMPTOMS
HEARTBURN: 0
CHILLS: 0
NAUSEA: 0
LOSS OF CONSCIOUSNESS: 0
SENSORY CHANGE: 0
MUSCULOSKELETAL NEGATIVE: 1
SPUTUM PRODUCTION: 0
ORTHOPNEA: 0
SHORTNESS OF BREATH: 0
VOMITING: 0
WHEEZING: 0
FOCAL WEAKNESS: 0
TINGLING: 0
WEIGHT LOSS: 0
FEVER: 0
SPEECH CHANGE: 0
TREMORS: 0
PALPITATIONS: 0
PSYCHIATRIC NEGATIVE: 1
COUGH: 0
HEMOPTYSIS: 0
HEADACHES: 0
BLOOD IN STOOL: 0
DIZZINESS: 0
PND: 0

## 2023-02-07 ASSESSMENT — FIBROSIS 4 INDEX: FIB4 SCORE: 1.6

## 2023-02-07 NOTE — CARDIAC REMOTE MONITOR - SCAN
Device transmission reviewed. Device demonstrated appropriate function.       Electronically Signed by: Eli Tang M.D.    2/7/2023  12:00 PM

## 2023-02-14 LAB — EKG IMPRESSION: NORMAL

## 2023-02-28 ENCOUNTER — OFFICE VISIT (OUTPATIENT)
Dept: CARDIOLOGY | Facility: MEDICAL CENTER | Age: 73
End: 2023-02-28
Payer: COMMERCIAL

## 2023-02-28 ENCOUNTER — TELEPHONE (OUTPATIENT)
Dept: CARDIOLOGY | Facility: MEDICAL CENTER | Age: 73
End: 2023-02-28

## 2023-02-28 VITALS
SYSTOLIC BLOOD PRESSURE: 110 MMHG | HEIGHT: 72 IN | DIASTOLIC BLOOD PRESSURE: 70 MMHG | HEART RATE: 82 BPM | WEIGHT: 176 LBS | BODY MASS INDEX: 23.84 KG/M2 | OXYGEN SATURATION: 92 % | RESPIRATION RATE: 14 BRPM

## 2023-02-28 DIAGNOSIS — I48.3 TYPICAL ATRIAL FLUTTER (HCC): Chronic | ICD-10-CM

## 2023-02-28 DIAGNOSIS — Z45.09 ENCOUNTER FOR LOOP RECORDER CHECK: ICD-10-CM

## 2023-02-28 DIAGNOSIS — I48.3 TYPICAL ATRIAL FLUTTER (HCC): ICD-10-CM

## 2023-02-28 DIAGNOSIS — I48.0 PAF (PAROXYSMAL ATRIAL FIBRILLATION) (HCC): ICD-10-CM

## 2023-02-28 PROCEDURE — 99214 OFFICE O/P EST MOD 30 MIN: CPT | Mod: 25 | Performed by: INTERNAL MEDICINE

## 2023-02-28 PROCEDURE — 93291 INTERROG DEV EVAL SCRMS IP: CPT | Performed by: INTERNAL MEDICINE

## 2023-02-28 PROCEDURE — 93000 ELECTROCARDIOGRAM COMPLETE: CPT | Mod: 59 | Performed by: INTERNAL MEDICINE

## 2023-02-28 RX ORDER — DABIGATRAN ETEXILATE 150 MG/1
150 CAPSULE ORAL 2 TIMES DAILY
Qty: 60 CAPSULE | Refills: 11 | Status: CANCELLED | OUTPATIENT
Start: 2023-02-28

## 2023-02-28 ASSESSMENT — FIBROSIS 4 INDEX: FIB4 SCORE: 1.6

## 2023-02-28 NOTE — TELEPHONE ENCOUNTER
----- Message from Cartachito Ferrell M.D. sent at 2/28/2023  3:06 PM PST -----  Let's schedule Watchman, no meds to hold, thanks

## 2023-02-28 NOTE — PROGRESS NOTES
Arrhythmia Clinic Note (Established patient)    DOS: 2/28/2023    Chief complaint/Reason for consult: Afib    Interval History: 72 y/o M with typical AFL and h/o CAD s/p CABG sent for ablation in Dec and ILR implant to screen for pAF. pAF was detected relatively soon after, total 51 episodes thus far. He is asymptomatic to it. Frustrated that he remains on OAC as he is not able to tolerate due to side effects. Inquiring about ablation such as to get him off anticoagulation.     ROS (+ highlighted in bold):  Constitutional: Fevers/chills/fatigue/weightloss  HEENT: Blurry vision/eye pain/sore throat/hearing loss  Respiratory: Shortness of breath/cough  Cardiovascular: Chest pain/palpitations/edema/orthopnea/syncope  GI: Nausea/vomitting/diarrhea  MSK: Arthralgias/myagias/muscle weakness  Skin: Rash/sores  Neurological: Numbness/tremors/vertigo  Endocrine: Excessive thirst/polyuria/cold intolerance/heat intolerance  Psych: Depression/anxiety    Past Medical History:   Diagnosis Date    Arrhythmia     pt unaware    ASTHMA     uses inhaler once a day    Breath shortness     Bronchitis     Cataract 2022    IOL bilat    Coronary artery disease due to lipid rich plaque - CABG     High cholesterol     History of myocardial infarction     unaware of at the time    Pain 09/16/2022    intermittent in chest with exercise    Pneumonia     Postoperative atrial fibrillation (HCC) 2013 in setting of CABG     S/P coronary artery bypass graft x 5     Sleep apnea     no cpap    Snoring     Typical atrial flutter (HCC) sen 9/16/2022        Past Surgical History:   Procedure Laterality Date    CATARACT EXTRACTION WITH IOL Bilateral 2022    FISTULA IN ANO REPAIR  2018    MULTIPLE CORONARY ARTERY BYPASS ENDO VEIN HARVEST  12/07/2013    Performed by Mo Vincent M.D. at SURGERY [facility] ORS    CHOLECYSTECTOMY  1996       Social History     Socioeconomic History    Marital status:      Spouse name: Kaylee    Number of children:  Not on file    Years of education: Not on file    Highest education level: Not on file   Occupational History     Employer: [employer]   Tobacco Use    Smoking status: Some Days     Packs/day: 0.50     Years: 55.00     Pack years: 27.50     Types: Cigarettes     Passive exposure: Past    Smokeless tobacco: Never   Vaping Use    Vaping Use: Never used   Substance and Sexual Activity    Alcohol use: Yes     Comment: 3 beers a week    Drug use: No    Sexual activity: Not on file   Other Topics Concern    Not on file   Social History Narrative    Not on file     Social Determinants of Health     Financial Resource Strain: Not on file   Food Insecurity: Not on file   Transportation Needs: Not on file   Physical Activity: Not on file   Stress: Not on file   Social Connections: Not on file   Intimate Partner Violence: Not on file   Housing Stability: Not on file       Family History   Problem Relation Age of Onset    Heart Disease Neg Hx        Allergies   Allergen Reactions    Cat Hair Extract Runny Nose and Itching     .    Xarelto [Rivaroxaban]      nausea       Current Outpatient Medications   Medication Sig Dispense Refill    apixaban (ELIQUIS) 5mg Tab Take 1 Tablet by mouth 2 times a day. 180 Tablet 3    umeclidinium-vilanterol (ANORO ELLIPTA) 62.5-25 MCG/ACT AEROSOL POWDER, BREATH ACTIVATED inhaler Inhale 1 Puff every day. 4 Each 0    varenicline (CHANTIX CONTINUING MONTH PAK) 1 MG tablet Take 1 Tablet by mouth 2 times a day. 60 Tablet 3    varenicline (CHANTIX STARTING MONTH PAK) 0.5 MG X 11 & 1 MG X 42 tablet Use as directed 56 Tablet 3    rosuvastatin (CRESTOR) 10 MG Tab Take 1 Tablet by mouth every day. 90 Tablet 3    ibuprofen (MOTRIN) 200 MG Tab Take 1 Tablet by mouth every 6 hours as needed for Mild Pain.      HYDROcodone/acetaminophen (NORCO)  MG Tab Take 1 Tablet by mouth every 6 hours as needed for Severe Pain.       No current facility-administered medications for this visit.       Physical  Exam:  Vitals:    02/28/23 1418   BP: 110/70   BP Location: Left arm   Patient Position: Sitting   BP Cuff Size: Adult   Pulse: 82   Resp: 14   SpO2: 92%   Weight: 79.8 kg (176 lb)   Height: 1.829 m (6')     General appearance: NAD, conversant   Eyes: anicteric sclerae, moist conjunctivae; no lid-lag; PERRLA  HENT: Atraumatic; oropharynx clear with moist mucous membranes and no mucosal ulcerations; normal hard and soft palate  Neck: Trachea midline; FROM, supple, no thyromegaly or lymphadenopathy  Lungs: CTA, with normal respiratory effort and no intercostal retractions  CV: RRR, no MRGs, no JVD  Abdomen: Soft, non-tender; no masses or HSM  Extremities: No peripheral edema or extremity lymphadenopathy  Skin: Normal temperature, turgor and texture; no rash, ulcers or subcutaneous nodules  Psych: Appropriate affect, alert and oriented to person, place and time    Data:  Lipids:   Lab Results   Component Value Date/Time    CHOLSTRLTOT 148 08/29/2022 06:47 AM    TRIGLYCERIDE 81 08/29/2022 06:47 AM    HDL 52 08/29/2022 06:47 AM    LDL 80 08/29/2022 06:47 AM        BMP:  Lab Results   Component Value Date/Time    SODIUM 138 12/05/2022 0904    POTASSIUM 4.9 12/05/2022 0904    CHLORIDE 103 12/05/2022 0904    CO2 26 12/05/2022 0904    GLUCOSE 77 12/05/2022 0904    BUN 17 12/05/2022 0904    CREATININE 1.01 12/05/2022 0904    CALCIUM 10.4 12/05/2022 0904    ANION 9.0 12/05/2022 0904        TSH:   Lab Results   Component Value Date/Time    TSHULTRASEN 1.050 11/10/2020 1439        THYROXINE (T4):   No results found for: TYLERIR     CBC:   Lab Results   Component Value Date/Time    WBC 10.8 12/05/2022 09:04 AM    RBC 5.28 12/05/2022 09:04 AM    HEMOGLOBIN 17.0 12/05/2022 09:04 AM    HEMATOCRIT 50.9 12/05/2022 09:04 AM    MCV 96.4 12/05/2022 09:04 AM    MCH 32.2 12/05/2022 09:04 AM    MCHC 33.4 (L) 12/05/2022 09:04 AM    RDW 50.5 (H) 12/05/2022 09:04 AM    PLATELETCT 262 12/05/2022 09:04 AM    MPV 9.6 12/05/2022 09:04 AM     NEUTSPOLYS 60.10 12/05/2022 09:04 AM    LYMPHOCYTES 26.20 12/05/2022 09:04 AM    MONOCYTES 9.30 12/05/2022 09:04 AM    EOSINOPHILS 3.20 12/05/2022 09:04 AM    BASOPHILS 0.70 12/05/2022 09:04 AM    IMMGRAN 0.50 12/05/2022 09:04 AM    NRBC 0.00 12/05/2022 09:04 AM    NEUTS 6.47 12/05/2022 09:04 AM    LYMPHS 2.82 12/05/2022 09:04 AM    MONOS 1.00 (H) 12/05/2022 09:04 AM    EOS 0.34 12/05/2022 09:04 AM    BASO 0.07 12/05/2022 09:04 AM    IMMGRANAB 0.05 12/05/2022 09:04 AM    NRBCAB 0.00 12/05/2022 09:04 AM        CBC w/o DIFF  Lab Results   Component Value Date/Time    WBC 10.8 12/05/2022 09:04 AM    RBC 5.28 12/05/2022 09:04 AM    HEMOGLOBIN 17.0 12/05/2022 09:04 AM    MCV 96.4 12/05/2022 09:04 AM    MCH 32.2 12/05/2022 09:04 AM    MCHC 33.4 (L) 12/05/2022 09:04 AM    RDW 50.5 (H) 12/05/2022 09:04 AM    MPV 9.6 12/05/2022 09:04 AM       ILR interrogation shows 0.3% burden Afib, 51 sustained episodes, some episodes showing possible recurrent AFL    EKG interpreted by me: SR    Impression/Plan:  1. Typical atrial flutter (HCC) seen 9/16/2022  EKG        AFL s/p ablation  pAF  Hypercoagulable state due to afib  CAD s/p CABG  ICM Ef 50%, chronic systolic heart failure    - Given [id number] 3 I recommend OAC (age, CAD, CHF)  - He is intolerant of OAC due to side effects, discussed PRESTON-C as an alternative. Risks and benefits discussed including effusion, MI, CVA, bleeding, death. Pt wishes to proceed  - Continue to monitor Afib burden through ILR consider ablation for pAF/AFL if rising burden and symptomatic    Plan PRESTON-C     Catrachito Ferrell MD  Cardiac Electrophysiology

## 2023-03-01 NOTE — TELEPHONE ENCOUNTER
Called patient to schedule. He is at work right now and will call me back when he is able to write down the instructions.

## 2023-03-01 NOTE — TELEPHONE ENCOUNTER
Patient scheduled for post watchman EDWAR w/anesthesia on 5-30-23 with Dr. Canales. Patient has been instructed to check in at 7:00 for 9:00 case time. Message sent to willa CHANEL to Dr. Canales.

## 2023-03-01 NOTE — ADDENDUM NOTE
Addended by: DONTA VARGAS on: 2/28/2023 04:03 PM     Modules accepted: Orders     Referred by: Roxy Irving PA-C; Medical Diagnosis (from order):    Diagnosis Information      Diagnosis    716.96 (ICD-9-CM) - M17.12 (ICD-10-CM) - Arthritis of left knee                Physical Therapy -  Daily Treatment Note    Visit: 17    SUBJECTIVE                                                                                                             Knee is feeling okay today. I am still working hard at the gym. I do feel tightness across the top of my knee.   Pain / Symptoms:  Pain rating (out of 10): Current: 0     OBJECTIVE                                                                                                                     Range of Motion (ROM)   (degrees unless noted; active unless noted; norms in ( ); negative=lacking to 0, positive=beyond 0)   Knee:    - Flexion (150):        • Right: 105    - Extension (0-10):        • Right: -3  Details / Comments: 105 AAROM       TREATMENT                                                                                                                  Therapeutic Exercise:  Upright bike seat 14, 5 minutes full rev note   Prone knee hangs 3 x 60 seconds   Hamstring curl in standing with support 3 x 10  Hamstring curl in prone with overpressure holds x 10 with 15 second hold   Seated knee flexion 2 x 10  Heel slides 3 x 10           Manual Therapy:  Patient granted this therapist permission to perform manual techniques in all areas mentioned below prior to implementation:  Patellar mobilizations   PA knee mobilization with knee flexion   Heel slides with overpressure and holds                     Skilled input: verbal instruction/cues, tactile instruction/cues and as detailed above    Writer verbally educated and received verbal consent for hand placement, positioning of patient, and techniques to be performed today from patient for hand placement and palpation for techniques, clothing adjustments for techniques and therapist position for  

## 2023-03-01 NOTE — TELEPHONE ENCOUNTER
Patient scheduled for watchman w/EDWAR on 4-19-23 with Dr. Ferrell. Patient has been instructed to check in at 8:30 for 10:30 case time. Message sent to authorizations. Watchman rep is aware of this date.

## 2023-03-10 ENCOUNTER — NON-PROVIDER VISIT (OUTPATIENT)
Dept: CARDIOLOGY | Facility: MEDICAL CENTER | Age: 73
End: 2023-03-10
Payer: COMMERCIAL

## 2023-03-10 PROCEDURE — 93298 REM INTERROG DEV EVAL SCRMS: CPT | Performed by: INTERNAL MEDICINE

## 2023-03-10 NOTE — CARDIAC REMOTE MONITOR - SCAN
Device transmission reviewed. Device demonstrated appropriate function.       Electronically Signed by: Eli Tang M.D.    3/27/2023  8:15 AM

## 2023-03-11 LAB — EKG IMPRESSION: NORMAL

## 2023-03-15 NOTE — TELEPHONE ENCOUNTER
Received signed form from CW and electronically mailed to AW via [employer], completed status received.    Fax sent to scanning via [employer], completed status received.

## 2023-03-30 ENCOUNTER — APPOINTMENT (OUTPATIENT)
Dept: ADMISSIONS | Facility: MEDICAL CENTER | Age: 73
DRG: 274 | End: 2023-03-30
Attending: INTERNAL MEDICINE
Payer: COMMERCIAL

## 2023-04-04 ENCOUNTER — PRE-ADMISSION TESTING (OUTPATIENT)
Dept: ADMISSIONS | Facility: MEDICAL CENTER | Age: 73
DRG: 274 | End: 2023-04-04
Attending: INTERNAL MEDICINE
Payer: COMMERCIAL

## 2023-04-10 ENCOUNTER — NON-PROVIDER VISIT (OUTPATIENT)
Dept: CARDIOLOGY | Facility: MEDICAL CENTER | Age: 73
End: 2023-04-10
Payer: COMMERCIAL

## 2023-04-10 PROCEDURE — 93298 REM INTERROG DEV EVAL SCRMS: CPT | Performed by: INTERNAL MEDICINE

## 2023-04-10 NOTE — CARDIAC REMOTE MONITOR - SCAN
Device transmission reviewed. Device demonstrated appropriate function.       Electronically Signed by: Catrachito Ferrell M.D.    4/10/2023  2:56 PM

## 2023-04-17 ENCOUNTER — PRE-ADMISSION TESTING (OUTPATIENT)
Dept: ADMISSIONS | Facility: MEDICAL CENTER | Age: 73
DRG: 274 | End: 2023-04-17
Attending: INTERNAL MEDICINE
Payer: COMMERCIAL

## 2023-04-17 ENCOUNTER — TELEPHONE (OUTPATIENT)
Dept: CARDIOLOGY | Facility: MEDICAL CENTER | Age: 73
End: 2023-04-17

## 2023-04-17 DIAGNOSIS — Z01.810 PRE-OPERATIVE CARDIOVASCULAR EXAMINATION: ICD-10-CM

## 2023-04-17 DIAGNOSIS — Z01.812 PRE-OPERATIVE LABORATORY EXAMINATION: ICD-10-CM

## 2023-04-17 LAB
ANION GAP SERPL CALC-SCNC: 9 MMOL/L (ref 7–16)
APTT PPP: 31.5 SEC (ref 24.7–36)
BASOPHILS # BLD AUTO: 0.7 % (ref 0–1.8)
BASOPHILS # BLD: 0.09 K/UL (ref 0–0.12)
BUN SERPL-MCNC: 17 MG/DL (ref 8–22)
CALCIUM SERPL-MCNC: 9.7 MG/DL (ref 8.5–10.5)
CHLORIDE SERPL-SCNC: 104 MMOL/L (ref 96–112)
CO2 SERPL-SCNC: 25 MMOL/L (ref 20–33)
CREAT SERPL-MCNC: 1.2 MG/DL (ref 0.5–1.4)
EKG IMPRESSION: NORMAL
EOSINOPHIL # BLD AUTO: 0.32 K/UL (ref 0–0.51)
EOSINOPHIL NFR BLD: 2.6 % (ref 0–6.9)
ERYTHROCYTE [DISTWIDTH] IN BLOOD BY AUTOMATED COUNT: 50.1 FL (ref 35.9–50)
GFR SERPLBLD CREATININE-BSD FMLA CKD-EPI: 64 ML/MIN/1.73 M 2
GLUCOSE SERPL-MCNC: 126 MG/DL (ref 65–99)
HCT VFR BLD AUTO: 48.2 % (ref 42–52)
HGB BLD-MCNC: 16.6 G/DL (ref 14–18)
IMM GRANULOCYTES # BLD AUTO: 0.08 K/UL (ref 0–0.11)
IMM GRANULOCYTES NFR BLD AUTO: 0.6 % (ref 0–0.9)
INR PPP: 1.27 (ref 0.87–1.13)
LYMPHOCYTES # BLD AUTO: 2.98 K/UL (ref 1–4.8)
LYMPHOCYTES NFR BLD: 24 % (ref 22–41)
MCH RBC QN AUTO: 32.5 PG (ref 27–33)
MCHC RBC AUTO-ENTMCNC: 34.4 G/DL (ref 33.7–35.3)
MCV RBC AUTO: 94.3 FL (ref 81.4–97.8)
MONOCYTES # BLD AUTO: 1.08 K/UL (ref 0–0.85)
MONOCYTES NFR BLD AUTO: 8.7 % (ref 0–13.4)
NEUTROPHILS # BLD AUTO: 7.89 K/UL (ref 1.82–7.42)
NEUTROPHILS NFR BLD: 63.4 % (ref 44–72)
NRBC # BLD AUTO: 0 K/UL
NRBC BLD-RTO: 0 /100 WBC
PLATELET # BLD AUTO: 260 K/UL (ref 164–446)
PMV BLD AUTO: 9.3 FL (ref 9–12.9)
POTASSIUM SERPL-SCNC: 4.6 MMOL/L (ref 3.6–5.5)
PROTHROMBIN TIME: 15.7 SEC (ref 12–14.6)
RBC # BLD AUTO: 5.11 M/UL (ref 4.7–6.1)
SODIUM SERPL-SCNC: 138 MMOL/L (ref 135–145)
WBC # BLD AUTO: 12.4 K/UL (ref 4.8–10.8)

## 2023-04-17 PROCEDURE — 93010 ELECTROCARDIOGRAM REPORT: CPT | Performed by: INTERNAL MEDICINE

## 2023-04-17 PROCEDURE — 80048 BASIC METABOLIC PNL TOTAL CA: CPT

## 2023-04-17 PROCEDURE — 36415 COLL VENOUS BLD VENIPUNCTURE: CPT

## 2023-04-17 PROCEDURE — 85025 COMPLETE CBC W/AUTO DIFF WBC: CPT

## 2023-04-17 PROCEDURE — 85730 THROMBOPLASTIN TIME PARTIAL: CPT

## 2023-04-17 PROCEDURE — 93005 ELECTROCARDIOGRAM TRACING: CPT

## 2023-04-17 PROCEDURE — 85610 PROTHROMBIN TIME: CPT

## 2023-04-17 NOTE — TELEPHONE ENCOUNTER
Caller: Sebastian Gomez    Topic/issue: Patient would like to discuss and go over echo results that he just did today. Patient states that they said he has a blockage.    Callback Number: [phone]    Thank you,  -Kathy GARZA

## 2023-04-17 NOTE — TELEPHONE ENCOUNTER
Updated pt that [facility] will review ECG. Can be artifact and readings from machine not always accurate.     Will let pt know if there is any issue.

## 2023-04-18 NOTE — TELEPHONE ENCOUNTER
Catrachito Ferrell M.D.  You 11 hours ago (7:00 PM)       I have reviewed, no changes, please reassure, thanks

## 2023-04-19 ENCOUNTER — HOSPITAL ENCOUNTER (INPATIENT)
Facility: MEDICAL CENTER | Age: 73
LOS: 1 days | DRG: 274 | End: 2023-04-20
Attending: INTERNAL MEDICINE | Admitting: INTERNAL MEDICINE
Payer: COMMERCIAL

## 2023-04-19 ENCOUNTER — APPOINTMENT (OUTPATIENT)
Dept: CARDIOLOGY | Facility: MEDICAL CENTER | Age: 73
DRG: 274 | End: 2023-04-19
Attending: INTERNAL MEDICINE
Payer: COMMERCIAL

## 2023-04-19 ENCOUNTER — ANESTHESIA EVENT (OUTPATIENT)
Dept: CARDIOLOGY | Facility: MEDICAL CENTER | Age: 73
DRG: 274 | End: 2023-04-19
Payer: COMMERCIAL

## 2023-04-19 ENCOUNTER — ANESTHESIA (OUTPATIENT)
Dept: CARDIOLOGY | Facility: MEDICAL CENTER | Age: 73
DRG: 274 | End: 2023-04-19
Payer: COMMERCIAL

## 2023-04-19 DIAGNOSIS — Z95.818 PRESENCE OF WATCHMAN LEFT ATRIAL APPENDAGE CLOSURE DEVICE: ICD-10-CM

## 2023-04-19 DIAGNOSIS — I48.0 PAF (PAROXYSMAL ATRIAL FIBRILLATION) (HCC): ICD-10-CM

## 2023-04-19 LAB
ABO GROUP BLD: NORMAL
ACT BLD: 323 SEC (ref 74–137)
BLD GP AB SCN SERPL QL: NORMAL
EKG IMPRESSION: NORMAL
LV EJECT FRACT  99904: 45
RH BLD: NORMAL

## 2023-04-19 PROCEDURE — 33340 PERQ CLSR TCAT L ATR APNDGE: CPT | Mod: Q0 | Performed by: INTERNAL MEDICINE

## 2023-04-19 PROCEDURE — 86850 RBC ANTIBODY SCREEN: CPT

## 2023-04-19 PROCEDURE — 700111 HCHG RX REV CODE 636 W/ 250 OVERRIDE (IP): Performed by: ANESTHESIOLOGY

## 2023-04-19 PROCEDURE — 85347 COAGULATION TIME ACTIVATED: CPT

## 2023-04-19 PROCEDURE — 93005 ELECTROCARDIOGRAM TRACING: CPT | Performed by: INTERNAL MEDICINE

## 2023-04-19 PROCEDURE — 02L73DK OCCLUSION OF LEFT ATRIAL APPENDAGE WITH INTRALUMINAL DEVICE, PERCUTANEOUS APPROACH: ICD-10-PCS | Performed by: INTERNAL MEDICINE

## 2023-04-19 PROCEDURE — 700101 HCHG RX REV CODE 250: Performed by: ANESTHESIOLOGY

## 2023-04-19 PROCEDURE — 93010 ELECTROCARDIOGRAM REPORT: CPT | Mod: 59 | Performed by: INTERNAL MEDICINE

## 2023-04-19 PROCEDURE — 301140 CL-LEFT ATRIAL APPENDAGE CLOSURE

## 2023-04-19 PROCEDURE — 160036 HCHG PACU - EA ADDL 30 MINS PHASE I

## 2023-04-19 PROCEDURE — 700102 HCHG RX REV CODE 250 W/ 637 OVERRIDE(OP): Performed by: INTERNAL MEDICINE

## 2023-04-19 PROCEDURE — 01926 ANES IVNTL RAD ICR ICAR/AORT: CPT | Performed by: ANESTHESIOLOGY

## 2023-04-19 PROCEDURE — 86900 BLOOD TYPING SEROLOGIC ABO: CPT

## 2023-04-19 PROCEDURE — 36620 INSERTION CATHETER ARTERY: CPT | Performed by: ANESTHESIOLOGY

## 2023-04-19 PROCEDURE — 99100 ANES PT EXTEME AGE<1 YR&>70: CPT | Performed by: ANESTHESIOLOGY

## 2023-04-19 PROCEDURE — 700111 HCHG RX REV CODE 636 W/ 250 OVERRIDE (IP)

## 2023-04-19 PROCEDURE — 93355 ECHO TRANSESOPHAGEAL (TEE): CPT

## 2023-04-19 PROCEDURE — 700105 HCHG RX REV CODE 258: Performed by: INTERNAL MEDICINE

## 2023-04-19 PROCEDURE — 86901 BLOOD TYPING SEROLOGIC RH(D): CPT

## 2023-04-19 PROCEDURE — A9270 NON-COVERED ITEM OR SERVICE: HCPCS | Performed by: INTERNAL MEDICINE

## 2023-04-19 PROCEDURE — B24BZZ4 ULTRASONOGRAPHY OF HEART WITH AORTA, TRANSESOPHAGEAL: ICD-10-PCS | Performed by: ANESTHESIOLOGY

## 2023-04-19 PROCEDURE — 770020 HCHG ROOM/CARE - TELE (206)

## 2023-04-19 PROCEDURE — 700101 HCHG RX REV CODE 250

## 2023-04-19 PROCEDURE — 160035 HCHG PACU - 1ST 60 MINS PHASE I

## 2023-04-19 PROCEDURE — 160002 HCHG RECOVERY MINUTES (STAT)

## 2023-04-19 PROCEDURE — 700102 HCHG RX REV CODE 250 W/ 637 OVERRIDE(OP): Performed by: ANESTHESIOLOGY

## 2023-04-19 PROCEDURE — 93312 ECHO TRANSESOPHAGEAL: CPT | Mod: 26,59 | Performed by: ANESTHESIOLOGY

## 2023-04-19 PROCEDURE — A9270 NON-COVERED ITEM OR SERVICE: HCPCS | Performed by: ANESTHESIOLOGY

## 2023-04-19 RX ORDER — HYDROMORPHONE HYDROCHLORIDE 1 MG/ML
0.4 INJECTION, SOLUTION INTRAMUSCULAR; INTRAVENOUS; SUBCUTANEOUS
Status: DISCONTINUED | OUTPATIENT
Start: 2023-04-19 | End: 2023-04-19 | Stop reason: HOSPADM

## 2023-04-19 RX ORDER — MIDAZOLAM HYDROCHLORIDE 1 MG/ML
INJECTION INTRAMUSCULAR; INTRAVENOUS
Status: COMPLETED
Start: 2023-04-19 | End: 2023-04-19

## 2023-04-19 RX ORDER — DIPHENHYDRAMINE HYDROCHLORIDE 50 MG/ML
12.5 INJECTION INTRAMUSCULAR; INTRAVENOUS
Status: DISCONTINUED | OUTPATIENT
Start: 2023-04-19 | End: 2023-04-19 | Stop reason: HOSPADM

## 2023-04-19 RX ORDER — PROTAMINE SULFATE 10 MG/ML
INJECTION, SOLUTION INTRAVENOUS
Status: COMPLETED
Start: 2023-04-19 | End: 2023-04-19

## 2023-04-19 RX ORDER — ONDANSETRON 2 MG/ML
INJECTION INTRAMUSCULAR; INTRAVENOUS PRN
Status: DISCONTINUED | OUTPATIENT
Start: 2023-04-19 | End: 2023-04-19 | Stop reason: SURG

## 2023-04-19 RX ORDER — DEXAMETHASONE SODIUM PHOSPHATE 4 MG/ML
INJECTION, SOLUTION INTRA-ARTICULAR; INTRALESIONAL; INTRAMUSCULAR; INTRAVENOUS; SOFT TISSUE PRN
Status: DISCONTINUED | OUTPATIENT
Start: 2023-04-19 | End: 2023-04-19 | Stop reason: SURG

## 2023-04-19 RX ORDER — ONDANSETRON 2 MG/ML
4 INJECTION INTRAMUSCULAR; INTRAVENOUS
Status: DISCONTINUED | OUTPATIENT
Start: 2023-04-19 | End: 2023-04-19 | Stop reason: HOSPADM

## 2023-04-19 RX ORDER — HEPARIN SODIUM 200 [USP'U]/100ML
INJECTION, SOLUTION INTRAVENOUS
Status: COMPLETED
Start: 2023-04-19 | End: 2023-04-19

## 2023-04-19 RX ORDER — SODIUM CHLORIDE, SODIUM LACTATE, POTASSIUM CHLORIDE, CALCIUM CHLORIDE 600; 310; 30; 20 MG/100ML; MG/100ML; MG/100ML; MG/100ML
INJECTION, SOLUTION INTRAVENOUS CONTINUOUS
Status: ACTIVE | OUTPATIENT
Start: 2023-04-19 | End: 2023-04-19

## 2023-04-19 RX ORDER — CEFAZOLIN SODIUM 1 G/3ML
INJECTION, POWDER, FOR SOLUTION INTRAMUSCULAR; INTRAVENOUS PRN
Status: DISCONTINUED | OUTPATIENT
Start: 2023-04-19 | End: 2023-04-19 | Stop reason: SURG

## 2023-04-19 RX ORDER — LIDOCAINE HYDROCHLORIDE 20 MG/ML
INJECTION, SOLUTION INFILTRATION; PERINEURAL
Status: COMPLETED
Start: 2023-04-19 | End: 2023-04-19

## 2023-04-19 RX ORDER — OXYCODONE HCL 5 MG/5 ML
10 SOLUTION, ORAL ORAL
Status: COMPLETED | OUTPATIENT
Start: 2023-04-19 | End: 2023-04-19

## 2023-04-19 RX ORDER — OXYCODONE HCL 5 MG/5 ML
5 SOLUTION, ORAL ORAL
Status: COMPLETED | OUTPATIENT
Start: 2023-04-19 | End: 2023-04-19

## 2023-04-19 RX ORDER — HYDROMORPHONE HYDROCHLORIDE 1 MG/ML
0.2 INJECTION, SOLUTION INTRAMUSCULAR; INTRAVENOUS; SUBCUTANEOUS
Status: DISCONTINUED | OUTPATIENT
Start: 2023-04-19 | End: 2023-04-19 | Stop reason: HOSPADM

## 2023-04-19 RX ORDER — HEPARIN SODIUM 1000 [USP'U]/ML
INJECTION, SOLUTION INTRAVENOUS; SUBCUTANEOUS
Status: COMPLETED
Start: 2023-04-19 | End: 2023-04-19

## 2023-04-19 RX ORDER — HALOPERIDOL 5 MG/ML
1 INJECTION INTRAMUSCULAR
Status: DISCONTINUED | OUTPATIENT
Start: 2023-04-19 | End: 2023-04-19 | Stop reason: HOSPADM

## 2023-04-19 RX ORDER — ASPIRIN 81 MG/1
81 TABLET, CHEWABLE ORAL DAILY
Status: DISCONTINUED | OUTPATIENT
Start: 2023-04-20 | End: 2023-04-20 | Stop reason: HOSPADM

## 2023-04-19 RX ORDER — MEPERIDINE HYDROCHLORIDE 25 MG/ML
12.5 INJECTION INTRAMUSCULAR; INTRAVENOUS; SUBCUTANEOUS
Status: DISCONTINUED | OUTPATIENT
Start: 2023-04-19 | End: 2023-04-19 | Stop reason: HOSPADM

## 2023-04-19 RX ORDER — ALBUTEROL SULFATE 2.5 MG/3ML
2.5 SOLUTION RESPIRATORY (INHALATION)
Status: DISCONTINUED | OUTPATIENT
Start: 2023-04-19 | End: 2023-04-19 | Stop reason: HOSPADM

## 2023-04-19 RX ORDER — ROSUVASTATIN CALCIUM 10 MG/1
10 TABLET, COATED ORAL DAILY
Status: DISCONTINUED | OUTPATIENT
Start: 2023-04-19 | End: 2023-04-20 | Stop reason: HOSPADM

## 2023-04-19 RX ORDER — BUPIVACAINE HYDROCHLORIDE 5 MG/ML
INJECTION, SOLUTION EPIDURAL; INTRACAUDAL
Status: COMPLETED
Start: 2023-04-19 | End: 2023-04-19

## 2023-04-19 RX ORDER — HYDROCODONE BITARTRATE AND ACETAMINOPHEN 10; 325 MG/1; MG/1
1 TABLET ORAL EVERY 6 HOURS PRN
Status: DISCONTINUED | OUTPATIENT
Start: 2023-04-19 | End: 2023-04-20 | Stop reason: HOSPADM

## 2023-04-19 RX ORDER — HYDROMORPHONE HYDROCHLORIDE 1 MG/ML
0.1 INJECTION, SOLUTION INTRAMUSCULAR; INTRAVENOUS; SUBCUTANEOUS
Status: DISCONTINUED | OUTPATIENT
Start: 2023-04-19 | End: 2023-04-19 | Stop reason: HOSPADM

## 2023-04-19 RX ADMIN — APIXABAN 5 MG: 5 TABLET, FILM COATED ORAL at 17:37

## 2023-04-19 RX ADMIN — ONDANSETRON 4 MG: 2 INJECTION INTRAMUSCULAR; INTRAVENOUS at 11:35

## 2023-04-19 RX ADMIN — MIDAZOLAM 2 MG: 1 INJECTION INTRAMUSCULAR; INTRAVENOUS at 11:07

## 2023-04-19 RX ADMIN — LIDOCAINE HYDROCHLORIDE: 20 INJECTION, SOLUTION INFILTRATION; PERINEURAL at 10:52

## 2023-04-19 RX ADMIN — HEPARIN SODIUM 4000 UNITS: 200 INJECTION, SOLUTION INTRAVENOUS at 10:52

## 2023-04-19 RX ADMIN — ROSUVASTATIN 10 MG: 10 TABLET, FILM COATED ORAL at 17:37

## 2023-04-19 RX ADMIN — DEXAMETHASONE SODIUM PHOSPHATE 4 MG: 4 INJECTION, SOLUTION INTRA-ARTICULAR; INTRALESIONAL; INTRAMUSCULAR; INTRAVENOUS; SOFT TISSUE at 11:35

## 2023-04-19 RX ADMIN — SUGAMMADEX 200 MG: 100 INJECTION, SOLUTION INTRAVENOUS at 12:00

## 2023-04-19 RX ADMIN — PROPOFOL 150 MG: 10 INJECTION, EMULSION INTRAVENOUS at 11:11

## 2023-04-19 RX ADMIN — OXYCODONE HYDROCHLORIDE 10 MG: 5 SOLUTION ORAL at 13:20

## 2023-04-19 RX ADMIN — SODIUM CHLORIDE, POTASSIUM CHLORIDE, SODIUM LACTATE AND CALCIUM CHLORIDE: 600; 310; 30; 20 INJECTION, SOLUTION INTRAVENOUS at 11:12

## 2023-04-19 RX ADMIN — FENTANYL CITRATE 100 MCG: 50 INJECTION, SOLUTION INTRAMUSCULAR; INTRAVENOUS at 11:10

## 2023-04-19 RX ADMIN — ROCURONIUM BROMIDE 100 MG: 10 INJECTION, SOLUTION INTRAVENOUS at 11:11

## 2023-04-19 RX ADMIN — BUPIVACAINE HYDROCHLORIDE: 5 INJECTION, SOLUTION EPIDURAL; INTRACAUDAL; PERINEURAL at 10:52

## 2023-04-19 RX ADMIN — CEFAZOLIN 2 G: 330 INJECTION, POWDER, FOR SOLUTION INTRAMUSCULAR; INTRAVENOUS at 11:15

## 2023-04-19 RX ADMIN — HEPARIN SODIUM 10000 UNITS: 1000 INJECTION, SOLUTION INTRAVENOUS; SUBCUTANEOUS at 11:33

## 2023-04-19 RX ADMIN — PROTAMINE SULFATE 40 MG: 10 INJECTION, SOLUTION INTRAVENOUS at 11:59

## 2023-04-19 ASSESSMENT — COGNITIVE AND FUNCTIONAL STATUS - GENERAL
SUGGESTED CMS G CODE MODIFIER MOBILITY: CH
DAILY ACTIVITIY SCORE: 24
SUGGESTED CMS G CODE MODIFIER MOBILITY: CH
MOBILITY SCORE: 24
SUGGESTED CMS G CODE MODIFIER DAILY ACTIVITY: CH
MOBILITY SCORE: 24
DAILY ACTIVITIY SCORE: 24
SUGGESTED CMS G CODE MODIFIER DAILY ACTIVITY: CH

## 2023-04-19 ASSESSMENT — PATIENT HEALTH QUESTIONNAIRE - PHQ9
2. FEELING DOWN, DEPRESSED, IRRITABLE, OR HOPELESS: NOT AT ALL
SUM OF ALL RESPONSES TO PHQ9 QUESTIONS 1 AND 2: 0
1. LITTLE INTEREST OR PLEASURE IN DOING THINGS: NOT AT ALL
2. FEELING DOWN, DEPRESSED, IRRITABLE, OR HOPELESS: NOT AT ALL
1. LITTLE INTEREST OR PLEASURE IN DOING THINGS: NOT AT ALL
SUM OF ALL RESPONSES TO PHQ9 QUESTIONS 1 AND 2: 0

## 2023-04-19 ASSESSMENT — LIFESTYLE VARIABLES
HOW MANY TIMES IN THE PAST YEAR HAVE YOU HAD 5 OR MORE DRINKS IN A DAY: 0
DOES PATIENT WANT TO STOP DRINKING: NO
HAVE PEOPLE ANNOYED YOU BY CRITICIZING YOUR DRINKING: NO
TOTAL SCORE: 0
TOTAL SCORE: 0
HAVE YOU EVER FELT YOU SHOULD CUT DOWN ON YOUR DRINKING: NO
AVERAGE NUMBER OF DAYS PER WEEK YOU HAVE A DRINK CONTAINING ALCOHOL: 1
EVER FELT BAD OR GUILTY ABOUT YOUR DRINKING: NO
CONSUMPTION TOTAL: NEGATIVE
ALCOHOL_USE: YES
ON A TYPICAL DAY WHEN YOU DRINK ALCOHOL HOW MANY DRINKS DO YOU HAVE: 3
TOTAL SCORE: 0
EVER HAD A DRINK FIRST THING IN THE MORNING TO STEADY YOUR NERVES TO GET RID OF A HANGOVER: NO

## 2023-04-19 ASSESSMENT — PAIN DESCRIPTION - PAIN TYPE: TYPE: ACUTE PAIN

## 2023-04-19 ASSESSMENT — FIBROSIS 4 INDEX: FIB4 SCORE: 1.61

## 2023-04-19 ASSESSMENT — PAIN SCALES - GENERAL: PAIN_LEVEL: 2

## 2023-04-19 NOTE — ANESTHESIA PREPROCEDURE EVALUATION
Date/Time: 04/19/23 1100    Scheduled providers: Catrachito Ferrell M.D.; Duncan De Guzman M.D.    Procedure: CL-LEFT ATRIAL APPENDAGE CLOSURE    Diagnosis:       PAF (paroxysmal atrial fibrillation) (HCC) [I48.0]      Presence of other cardiac implants and grafts [Z95.818]    Indications: See Associated Dx    Location: [facility] - CATH LAB - [facility]          Relevant Problems   CARDIAC   (positive) Coronary artery disease due to lipid rich plaque - CABG   (positive) Postoperative atrial fibrillation (HCC) 2013 in setting of CABG   (positive) Primary hypertension   (positive) Typical atrial flutter (HCC) seen 9/16/2022   (positive) Venous insufficiency of leg       Physical Exam    Airway   Mallampati: II  TM distance: >3 FB  Neck ROM: full       Cardiovascular - normal exam  Rhythm: regular  Rate: normal  (-) murmur     Dental - normal exam           Pulmonary - normal exam  Breath sounds clear to auscultation     Abdominal    Neurological - normal exam                 Anesthesia Plan    ASA 3       Plan - general       Airway plan will be ETT          Induction: intravenous    Postoperative Plan: Postoperative administration of opioids is intended.    Pertinent diagnostic labs and testing reviewed    Informed Consent:    Anesthetic plan and risks discussed with patient.    Use of blood products discussed with: patient whom consented to blood products.

## 2023-04-19 NOTE — ANESTHESIA POSTPROCEDURE EVALUATION
Patient: Hugo Gomez    Procedure Summary     Date: 04/19/23 Room / Location: [facility] IMAGING - CATH LAB [facility]    Anesthesia Start: 1104 Anesthesia Stop: 1215    Procedure: CL-LEFT ATRIAL APPENDAGE CLOSURE Diagnosis:       PAF (paroxysmal atrial fibrillation) (HCC)      Presence of other cardiac implants and grafts      (See Associated Dx)    Scheduled Providers: Catrachito Ferrell M.D.; Duncan De Guzman M.D. Responsible Provider: Duncan De Guzman M.D.    Anesthesia Type: general ASA Status: 3          Final Anesthesia Type: general  Last vitals  BP   Blood Pressure : 110/57    Temp   36.2 °C (97.1 °F)    Pulse   (!) 47   Resp   20    SpO2   96 %      Anesthesia Post Evaluation    Patient location during evaluation: PACU  Patient participation: complete - patient participated  Level of consciousness: awake and alert  Pain score: 2    Airway patency: patent  Anesthetic complications: no  Cardiovascular status: hemodynamically stable  Respiratory status: acceptable  Hydration status: euvolemic    PONV: none          No notable events documented.     Nurse Pain Score: 2 (NPRS)

## 2023-04-19 NOTE — H&P
[facility]  Electrophysiology Pre-procedure H&P    DOS:4/19/2023    Planned Procedure: PRESTON-C    Chief complaint/Reason for Procedure: pAF    HPI: 74 y/o M with AFL s/p ablation and ILR in situ with device detected afib, for PRESTON-C due to anticoagulation intolerance      Past Medical History:   Diagnosis Date    Arrhythmia     pt unaware    ASTHMA     uses inhaler once a day    Breath shortness     Bronchitis     Cataract 2022    IOL bilat    Coronary artery disease due to lipid rich plaque - CABG     High cholesterol     History of myocardial infarction     unaware of at the time    Implantable loop recorder present 04/04/2023    Pain 09/16/2022    intermittent in chest with exercise    Pneumonia     Postoperative atrial fibrillation (HCC) 2013 in setting of CABG     S/P coronary artery bypass graft x 5     Sleep apnea     no cpap    Snoring     Typical atrial flutter (HCC) sen 9/16/2022        Past Surgical History:   Procedure Laterality Date    CATARACT EXTRACTION WITH IOL Bilateral 2022    FISTULA IN ANO REPAIR  2018    MULTIPLE CORONARY ARTERY BYPASS ENDO VEIN HARVEST  12/07/2013    Performed by Mo Vincent M.D. at SURGERY [facility]    CHOLECYSTECTOMY  1996    OTHER CARDIAC SURGERY  2013    OTHER CARDIAC SURGERY      Loop Recorder       Social History     Socioeconomic History    Marital status:      Spouse name: Kaylee    Number of children: Not on file    Years of education: Not on file    Highest education level: Not on file   Occupational History     Employer: [employer]   Tobacco Use    Smoking status: Some Days     Packs/day: 0.50     Years: 55.00     Pack years: 27.50     Types: Cigarettes     Start date: 9/1/1968     Passive exposure: Past    Smokeless tobacco: Never    Tobacco comments:     I've quit many times over the years but I always start again   Vaping Use    Vaping Use: Never used   Substance and Sexual Activity    Alcohol use: Yes     Alcohol/week: 2.4 oz      Types: 4 Cans of beer per week     Comment: I usually will only drink once a week and it’s usually 3 or 4    Drug use: No    Sexual activity: Not on file   Other Topics Concern    Not on file   Social History Narrative    Not on file     Social Determinants of Health     Financial Resource Strain: Not on file   Food Insecurity: Not on file   Transportation Needs: Not on file   Physical Activity: Not on file   Stress: Not on file   Social Connections: Not on file   Intimate Partner Violence: Not on file   Housing Stability: Not on file       Family History   Problem Relation Age of Onset    Diabetes Mother     Stroke Mother     Cancer Father         None    Heart Disease Neg Hx        Allergies   Allergen Reactions    Cat Hair Extract Runny Nose and Itching     .    Xarelto [Rivaroxaban]      nausea       Current Facility-Administered Medications   Medication Dose Route Frequency Provider Last Rate Last Admin    lactated ringers infusion   Intravenous Continuous Catrachito Ferrell M.D.           Physical Exam:  Vitals:    04/19/23 0903   BP: 120/59   Pulse: (!) 58   Resp: 16   Temp: 36.1 °C (97 °F)   TempSrc: Temporal   SpO2: 93%   Weight: 81.7 kg (180 lb 1.9 oz)   Height: 1.829 m (6')     General appearance: NAD, conversant   Neck: Trachea midline; FROM, supple, no thyromegaly or lymphadenopathy  CV: RRR, no MRGs, no JVD   Extremities: No peripheral edema or extremity lymphadenopathy  Skin: Normal temperature, turgor and texture; no rash, ulcers or subcutaneous nodules  Psych: Appropriate affect, alert and oriented to person, place and time    Data:  Lab Results   Component Value Date/Time    CHOLSTRLTOT 148 08/29/2022 06:47 AM    LDL 80 08/29/2022 06:47 AM    HDL 52 08/29/2022 06:47 AM    TRIGLYCERIDE 81 08/29/2022 06:47 AM       Lab Results   Component Value Date/Time    SODIUM 138 04/17/2023 07:25 AM    POTASSIUM 4.6 04/17/2023 07:25 AM    CHLORIDE 104 04/17/2023 07:25 AM    CO2 25 04/17/2023 07:25 AM    GLUCOSE 126  (H) 04/17/2023 07:25 AM    BUN 17 04/17/2023 07:25 AM    CREATININE 1.20 04/17/2023 07:25 AM    BUNCREATRAT 20 03/21/2016 07:42 AM     Lab Results   Component Value Date/Time    ALKPHOSPHAT 67 12/05/2022 09:04 AM    ASTSGOT 21 12/05/2022 09:04 AM    ALTSGPT 13 12/05/2022 09:04 AM    TBILIRUBIN 0.6 12/05/2022 09:04 AM      Lab Results   Component Value Date/Time    BNPBTYPENAT 467 (H) 12/12/2013 09:35 PM         Recent Labs     04/17/23  0725   WBC 12.4*   RBC 5.11   HEMOGLOBIN 16.6   HEMATOCRIT 48.2   MCV 94.3   MCH 32.5   MCHC 34.4   RDW 50.1*   PLATELETCT 260   MPV 9.3       EKG interpreted by me: NSR    Impression/Plan:  1) pAF  2) AFL s/p ablation    Plan PRESTON-C for anticoagulation intolerance. Risks include perforation, effusion, tamponade, CVA, MI, emergent surgery, death.  Pt understands and wishes to proceed      Catrachito Ferrell MD  Cardiac Electrophysiology

## 2023-04-19 NOTE — ANESTHESIA PROCEDURE NOTES
EDWAR    Date/Time: 4/19/2023 11:15 AM  Performed by: Duncan De Guzman M.D.  Authorized by: Duncan De Guzman M.D.     Start Time:4/19/2023 11:15 AM  Preanesthetic Checklist: patient identified, IV checked, site marked, risks and benefits discussed, surgical consent, monitors and equipment checked, pre-op evaluation and timeout performed    Indication for EDWAR: diagnostic     Intubated: Yes  Bite Block: Yes  Heart Visualized: Yes  Insertion: atraumatic    **See FULL EDWAR report in patient's chart via [facility] [employer]**

## 2023-04-19 NOTE — ANESTHESIA TIME REPORT
Anesthesia Start and Stop Event Times     Date Time Event    4/19/2023 1104 Anesthesia Start     1215 Anesthesia Stop        Responsible Staff  04/19/23    Name Role Begin End    Duncan De Guzman M.D. Anesth 1104 1215        Overtime Reason:  no overtime (within assigned shift)    Comments:

## 2023-04-19 NOTE — ANESTHESIA PROCEDURE NOTES
Airway    Date/Time: 4/19/2023 11:13 AM  Performed by: Duncan De Guzman M.D.  Authorized by: Duncan De Guzman M.D.     Location:  OR  Urgency:  Elective  Indications for Airway Management:  Anesthesia      Spontaneous Ventilation: absent    Sedation Level:  Deep  Preoxygenated: Yes    Patient Position:  Sniffing  Final Airway Type:  Endotracheal airway  Final Endotracheal Airway:  ETT  Cuffed: Yes    Technique Used for Successful ETT Placement:  Direct laryngoscopy    Insertion Site:  Oral  Blade Type:  Ed  Laryngoscope Blade/Videolaryngoscope Blade Size:  3  ETT Size (mm):  7.5  Measured from:  Teeth  ETT to Teeth (cm):  22  Placement Verified by: auscultation and capnometry    Cormack-Lehane Classification:  Grade IIa - partial view of glottis  Number of Attempts at Approach:  1

## 2023-04-19 NOTE — OP REPORT
"[facility] Electrophysiology/Structural Heart Procedure Note     Procedure(s) Performed:   1) Watchman PRESTON closure    Indication(s): Atrial fibrillation    Physician(s): Catrachito Ferrell M.D.     Resident/Assistant(s): None     Anesthesia: General endotracheal anesthetic with Dr. De Guzman     Specimen(s) Removed: None     Estimated Blood Loss:  30cc     Complications:  None     Description of Procedure:   After informed written consent, the patient was brought to the cath lab in the fasting, non-sedated state. The patient was prepped and draped in the usual sterile fashion. Femoral venous access was obtained using the modified Seldinger technique. This was done under direct US guidance to visualize the needle insertion. Images were saved to the PACS system. In the right femoral vein, an 8 Fr sheath were inserted over 0.035” guidewire and exchanged for an 8.5 Fr Ringgold trans-septal sheath. EDWAR was used to identify the atrial septum, and left atrial appendage.  A Ringgold trans-septal needle was inserted to into the trans-septal sheath, and under ultrasound guidance a inferior/posterior trans-septal location was used to cross into the left atrium. Intravenous heparin was given to target an -300. A stiff exchange length 0.035\" wire was inserted into the left atrium/left pulmonary veins, over which we exchanged to the WATCHMAN delivery sheath. The WATCHMAN device was prepped and flushed. A pigtail catheter was advanced into the delivery sheath into the left atrium and then after counter clockwise torque maneuvered into the body of the left atrial appendage. Cine was taken to verify the location of the pigtail in the appendage and to assess for depth. The sheath was then advanced over the pigtail until sufficient depth was reached.  The pigtail was removed, and under wet to wet connection the WATCHMAN device was advanced through the delivery sheath until markers were aligned. The sheath was retracted slowly to " deploy the device. Initial deployment demonstrated an undercompressed device, as such we recaptured and upsized the device. After the device was deployed we assessed again for leak with contrast injected through the sheath as well as a tug test. We verified good position, anchoring, size, and seal with no/minimal leak with EDWAR. After all criteria were met we detached the device from the delivery system. At the end of the procedure, heparin was reversed with protamine, the catheter and sheaths were removed, and hemostasis was achieved by manual compression along with a figure of 8 silk suture. Following recovery from anesthesia, the patient was transferred to the PPU in good condition.        Fluoroscopy time:  8.3 min    Contrast used: 70cc     Device implanted:  Size  24mm  Serial #[id number]  Max appendage pre-measurement 16 mm  Device measurement 15.8-19% compression     Impressions:    1. Successful PRESTON closure      Recommendations:  1. DOAC and ASA 81  2. Admit to monitored bedrest.  3. Removal of figure of 8 suture in the AM

## 2023-04-19 NOTE — OR NURSING
1211: pt arrived to PACU in stable condition. VSS. Dressing to right groin site is CDI. Soft to touch and no s/s of hematoma. Pt has been instructed to keep legs flat and has been oriented to surroundings.     Pt denies pain and nausea at this time.     1300: pt has asymptomatic bradycardia. MD De Guzman notified.     Pt wife called and updated on pt status.     Report called to Lisa LOPEZ. Pt transported to room in stable condition with ACLS RN and monitor.

## 2023-04-19 NOTE — ANESTHESIA PROCEDURE NOTES
Arterial Line  Performed by: Duncan De Guzman M.D.  Authorized by: Duncan De Guzman M.D.     Localization: surface landmarks    Patient Location:  OR  Indication: continuous blood pressure monitoring        Catheter Size:  20 G  Seldinger Technique?: Yes    Site:  Radial artery  Line Secured:  Antimicrobial disc, tape and transparent dressing  Events: patient tolerated procedure well with no complications

## 2023-04-20 VITALS
HEART RATE: 48 BPM | DIASTOLIC BLOOD PRESSURE: 56 MMHG | SYSTOLIC BLOOD PRESSURE: 107 MMHG | RESPIRATION RATE: 16 BRPM | HEIGHT: 72 IN | OXYGEN SATURATION: 95 % | BODY MASS INDEX: 24.4 KG/M2 | WEIGHT: 180.12 LBS | TEMPERATURE: 98.1 F

## 2023-04-20 LAB — EKG IMPRESSION: NORMAL

## 2023-04-20 PROCEDURE — A9270 NON-COVERED ITEM OR SERVICE: HCPCS | Performed by: NURSE PRACTITIONER

## 2023-04-20 PROCEDURE — 93005 ELECTROCARDIOGRAM TRACING: CPT | Performed by: INTERNAL MEDICINE

## 2023-04-20 PROCEDURE — 93010 ELECTROCARDIOGRAM REPORT: CPT | Performed by: INTERNAL MEDICINE

## 2023-04-20 PROCEDURE — A9270 NON-COVERED ITEM OR SERVICE: HCPCS | Performed by: INTERNAL MEDICINE

## 2023-04-20 PROCEDURE — 700102 HCHG RX REV CODE 250 W/ 637 OVERRIDE(OP): Performed by: NURSE PRACTITIONER

## 2023-04-20 PROCEDURE — 700102 HCHG RX REV CODE 250 W/ 637 OVERRIDE(OP): Performed by: INTERNAL MEDICINE

## 2023-04-20 RX ORDER — ASPIRIN 81 MG/1
81 TABLET, CHEWABLE ORAL DAILY
Qty: 100 TABLET | Refills: 3 | Status: SHIPPED | OUTPATIENT
Start: 2023-04-21

## 2023-04-20 RX ADMIN — ASPIRIN 81 MG 81 MG: 81 TABLET ORAL at 05:24

## 2023-04-20 RX ADMIN — APIXABAN 5 MG: 5 TABLET, FILM COATED ORAL at 05:24

## 2023-04-20 NOTE — CARE PLAN
Pt arrived to T-725 from PACU at 1400. Pt is very calm/cooperative. A&OX4. VSS on RA. C/o mild ha (PRN oxy given in PACU). R groin site CDI, no signs of hematoma, R leg CMS intact. Tolerating cardiac diet. Bedrest discontinued. Will monitor hemodynamic stability closely overnight.     Problem: Pain - Standard  Goal: Alleviation of pain or a reduction in pain to the patient’s comfort goal  Outcome: Progressing     Problem: Knowledge Deficit - Standard  Goal: Patient and family/care givers will demonstrate understanding of plan of care, disease process/condition, diagnostic tests and medications  Outcome: Progressing

## 2023-04-20 NOTE — PROGRESS NOTES
4 Eyes Skin Assessment Completed by JOHN Gonzalez and JOHN Tan.    Head WDL  Ears WDL  Nose WDL  Mouth WDL  Neck WDL  Breast/Chest WDL  Shoulder Blades WDL  Spine WDL  (R) Arm/Elbow/Hand WDL  (L) Arm/Elbow/Hand WDL  Abdomen WDL  Groin Incision  Scrotum/Coccyx/Buttocks WDL  (R) Leg WDL  (L) Leg WDL  (R) Heel/Foot/Toe WDL  (L) Heel/Foot/Toe WDL          Devices In Places Tele Box and Pulse Ox      Interventions In Place Pillows    Possible Skin Injury No    Pictures Uploaded Into Epic N/A  Wound Consult Placed N/A  RN Wound Prevention Protocol Ordered No

## 2023-04-20 NOTE — CARE PLAN
Problem: Pain - Standard  Goal: Alleviation of pain or a reduction in pain to the patient’s comfort goal  Outcome: Progressing     Problem: Knowledge Deficit - Standard  Goal: Patient and family/care givers will demonstrate understanding of plan of care, disease process/condition, diagnostic tests and medications  Outcome: Progressing     Problem: Acute Care of the PPM/AICD Patient  Goal: Post Procedure Optimal Care of the PPM/AICD Patient  Outcome: Progressing   The patient is Watcher - Medium risk of patient condition declining or worsening    Shift Goals  Clinical Goals: hemodynamic stability and pain management  Patient Goals: rest  Family Goals: OSVALDO    Progress made toward(s) clinical / shift goals:  monitor cath site, hemodynamic stability, manage pain per MAR    Patient is not progressing towards the following goals:

## 2023-04-20 NOTE — PROGRESS NOTES
Report received, pt care assumed, tele box on. VSS, pt assessment complete. Pt aaox4, no signs of distress noted at this time. POC discussed with pt and verbalizes no questions. Pt denies any additional needs at this time. Bed in lowest position, pt educated on fall risk and verbalized understanding, call light within reach, will continue to monitor.

## 2023-04-20 NOTE — DISCHARGE INSTRUCTIONS
[facility] POST WATCHMAN INSTRUCTIONS  No lifting > 10 lbs x 1 week.  No baths or hot tubs x 1 week.      May shower on 4/21/2023 and take off groin dressing and leave site uncovered.  Please try to keep Steri-strip in place and allow to come off on its own.  Continue to monitor site daily for warmth, redness, discolored drainage.    3. Please do not miss any doses of your blood thinner.      4. Please keep all follow up appointments scheduled for you.  You are scheduled for a wound check appointment in one week.  You are also scheduled for procedure follow up with EP clinic.     5. A transesophageal echocardiogram (EDWAR) will be scheduled for you  to check the healing of the Watchman.  This procedure is completed at [facility] same day procedures.  We use sedation/anesthesia for this procedure so you will need a .     6. Please walk and take deep breaths after discharge.  After discharge, if you experience severe chest pain, shortness of breath, neurological changes, high fever, severe dizziness, trouble with catheter site needs to be seen in the emergency dept.

## 2023-04-20 NOTE — DISCHARGE SUMMARY
Discharge Summary  CHIEF COMPLAINT ON ADMISSION  Elective admission for WATCHMAN procedure    CODE STATUS  Full Code    HPI & HOSPITAL COURSE  Sebastian Gomez is a very pleasant 73 y.o. year old male electively admitted for left atrial appendage closure due to his history of pAF and OAC intolerance.     Patient underwent successful PRESTON-C via WATCHMEN with Dr. Ferrell on 4/19/2023.    Patient has been seen and examined in EP rounds this AM.  Vital signs are stable. SB/SR on tele. Patient denies any chest pain, dyspnea, dizziness, paraesthesias, or other complaints.  His physical exam is without acute abnormality; specifically his right femoral access site is clean and dry.  The figure eight suture was removed without difficulty or evidence of hematoma or bleeding.  He has been out of bed and ambulated without difficulty.     Post WATCHMEN discharge instructions were reviewed with patient, all questions were answered. The importance of ASA and OAC adherence post WATCHMEN was discussed.     Therefore, he is discharged in good and stable condition with close outpatient follow-up.    PROCEDURES  WATCHJADE procedure, Dr. Ferrell, 4/19/2023.  Please see full procedure note for details.       FOLLOW UP  Future Appointments   Date Time Provider Department Center   5/15/2023  8:50 AM Iris Quintero M.D. [facility] None   5/30/2023  9:00 AM [facility] EXAM 3 EDWAR [facility]   6/1/2023  8:30 AM MITZI [facility] None       MEDICATIONS ON DISCHARGE     Medication List        START taking these medications        Instructions   aspirin 81 MG Chew chewable tablet  Start taking on: April 21, 2023  Commonly known as: ASA   Chew 1 Tablet every day.  Dose: 81 mg            CONTINUE taking these medications        Instructions   apixaban 5mg Tabs  Commonly known as: Eliquis   Take 1 Tablet by mouth 2 times a day.  Dose: 5 mg     HYDROcodone/acetaminophen  MG Tabs  Commonly known as: NORCO   Take 1 Tablet by mouth every 6 hours  as needed for Severe Pain.  Dose: 1 Tablet     rosuvastatin 10 MG Tabs  Commonly known as: CRESTOR   Take 1 Tablet by mouth every day.  Dose: 10 mg     * varenicline 1 MG tablet  Commonly known as: Chantix Continuing Month Aurelio   Take 1 Tablet by mouth 2 times a day.  Dose: 1 mg     * varenicline 0.5 MG X 11 & 1 MG X 42 tablet  Commonly known as: Chantix Starting Month Aurelio   Use as directed           * This list has 2 medication(s) that are the same as other medications prescribed for you. Read the directions carefully, and ask your doctor or other care provider to review them with you.                STOP taking these medications      ibuprofen 200 MG Tabs  Commonly known as: MOTRIN              ACTIVITY/POST WATCHMAN INSTRUCTIONS  No lifting > 10 lbs x 1 week.  No baths or hot tubs x 1 week.  May shower on 4/21/2023 and take off groin dressing and leave uncovered.  Continue to monitor site daily for warmth, redness, discolored drainage    Please do not miss any doses of your blood thinner.  Please keep all follow up appointments scheduled for you.       Please walk and take deep breaths after discharge.  After discharge, if  experiences severe chest pain, shortness of breath, neurological changes, high fever, severe dizziness, trouble with catheter site needs to be seen in the emergency dept.

## 2023-04-27 ENCOUNTER — TELEPHONE (OUTPATIENT)
Dept: CARDIOLOGY | Facility: MEDICAL CENTER | Age: 73
End: 2023-04-27
Payer: COMMERCIAL

## 2023-05-11 ENCOUNTER — NON-PROVIDER VISIT (OUTPATIENT)
Dept: CARDIOLOGY | Facility: MEDICAL CENTER | Age: 73
End: 2023-05-11
Payer: COMMERCIAL

## 2023-05-11 PROCEDURE — 93298 REM INTERROG DEV EVAL SCRMS: CPT | Performed by: INTERNAL MEDICINE

## 2023-05-11 NOTE — CARDIAC REMOTE MONITOR - SCAN
Device transmission reviewed. Device demonstrated appropriate function.       Electronically Signed by: Ivan Kelly M.D.    5/11/2023  1:02 PM

## 2023-05-15 ENCOUNTER — OFFICE VISIT (OUTPATIENT)
Dept: SLEEP MEDICINE | Facility: MEDICAL CENTER | Age: 73
End: 2023-05-15
Attending: INTERNAL MEDICINE
Payer: COMMERCIAL

## 2023-05-15 VITALS
HEART RATE: 57 BPM | OXYGEN SATURATION: 94 % | DIASTOLIC BLOOD PRESSURE: 66 MMHG | WEIGHT: 177 LBS | BODY MASS INDEX: 23.98 KG/M2 | HEIGHT: 72 IN | SYSTOLIC BLOOD PRESSURE: 102 MMHG

## 2023-05-15 DIAGNOSIS — Z72.0 TOBACCO USE: ICD-10-CM

## 2023-05-15 DIAGNOSIS — J43.9 PULMONARY EMPHYSEMA, UNSPECIFIED EMPHYSEMA TYPE (HCC): ICD-10-CM

## 2023-05-15 DIAGNOSIS — J18.9 PNEUMONIA OF RIGHT UPPER LOBE DUE TO INFECTIOUS ORGANISM: ICD-10-CM

## 2023-05-15 PROCEDURE — 3078F DIAST BP <80 MM HG: CPT | Performed by: INTERNAL MEDICINE

## 2023-05-15 PROCEDURE — 3074F SYST BP LT 130 MM HG: CPT | Performed by: INTERNAL MEDICINE

## 2023-05-15 PROCEDURE — 99211 OFF/OP EST MAY X REQ PHY/QHP: CPT | Performed by: INTERNAL MEDICINE

## 2023-05-15 PROCEDURE — 99214 OFFICE O/P EST MOD 30 MIN: CPT | Performed by: INTERNAL MEDICINE

## 2023-05-15 ASSESSMENT — ENCOUNTER SYMPTOMS
WEAKNESS: 0
SPUTUM PRODUCTION: 0
CLAUDICATION: 0
PHOTOPHOBIA: 0
STRIDOR: 0
DIZZINESS: 0
BACK PAIN: 0
SPEECH CHANGE: 0
NAUSEA: 0
DIAPHORESIS: 0
WHEEZING: 0
DEPRESSION: 0
SHORTNESS OF BREATH: 0
PND: 0
SINUS PAIN: 0
PALPITATIONS: 0
HEMOPTYSIS: 0
ORTHOPNEA: 0
DOUBLE VISION: 0
FEVER: 0
EYE DISCHARGE: 0
EYE PAIN: 0
SORE THROAT: 0
TREMORS: 0
ABDOMINAL PAIN: 0
MYALGIAS: 0
DIARRHEA: 0
CONSTIPATION: 0
NECK PAIN: 0
COUGH: 0
FALLS: 0
VOMITING: 0
EYE REDNESS: 0
HEARTBURN: 0
WEIGHT LOSS: 0
BLURRED VISION: 0
FOCAL WEAKNESS: 0
HEADACHES: 0
CHILLS: 0

## 2023-05-15 ASSESSMENT — FIBROSIS 4 INDEX: FIB4 SCORE: 1.61

## 2023-05-15 NOTE — PROGRESS NOTES
Chief Complaint   Patient presents with    Follow-Up     Pulmonary Emphysema. Last seen 11/14/22         HPI: This patient is a 73 y.o. male whom is followed in our clinic for COPD and hx of RUL pneumonia last seen by me on 11/14/22.   Past medical history significant for coronary artery disease status post CABG roughly 10 years ago which was complicated by angioedema likely secondary to ACE inhibitor.  He is a current tobacco smoker with greater than 40-pack-year history currently smoking 1/2 pack/day.  He works for [employer] in the [employer] but is retired from a career as a manager for [employer]. Pt suffered from RUL pna in 11/2021 and HRCT was done on 5 weeks after his initial diagnosis which showed some emphysematous changes in the bilateral upper lobes and what was described by a fibrotic and cystic process in the right upper lobe that appeared to me just resolving pneumonia.  He was recovering by the time I saw him in June of 2022 and able to do an hour of cardiovascular exercise daily.  HRCT showed no evidence of UIP or interstitial lung disease but did show sequelae of right upper lobe pneumonia and emphysematous changes.  PFT from August 2022 confirmed mild airflow obstruction with an FEV1 of 68% predicted prebronchodilator, normal lung volumes and a DLCO of 69% predicted.  He has not needed albuterol or anoro. Recently had watchman placed for AF and is recovering from this but overall feels respiratory status is good. He does cardiovascular exercise 6 days per week.     Past Medical History:   Diagnosis Date    Arrhythmia     pt unaware    ASTHMA     uses inhaler once a day    Breath shortness     Bronchitis     Cataract 2022    IOL bilat    Coronary artery disease due to lipid rich plaque - CABG     High cholesterol     History of myocardial infarction     unaware of at the time    Implantable loop recorder present 04/04/2023    Pain 09/16/2022    intermittent in chest with exercise    Pneumonia      Postoperative atrial fibrillation (HCC) 2013 in setting of CABG     S/P coronary artery bypass graft x 5     Sleep apnea     no cpap    Snoring     Typical atrial flutter (HCC) sen 9/16/2022        Social History     Socioeconomic History    Marital status:      Spouse name: Kaylee    Number of children: Not on file    Years of education: Not on file    Highest education level: Bachelor's degree (e.g., BA, AB, BS)   Occupational History     Employer: [employer]   Tobacco Use    Smoking status: Some Days     Packs/day: 0.50     Years: 55.00     Pack years: 27.50     Types: Cigarettes     Start date: 9/1/1968     Passive exposure: Past    Smokeless tobacco: Never    Tobacco comments:     I've quit many times over the years but I always start again   Vaping Use    Vaping Use: Never used   Substance and Sexual Activity    Alcohol use: Yes     Alcohol/week: 2.4 oz     Types: 4 Cans of beer per week     Comment: I usually will only drink once a week and it’s usually 3 or 4    Drug use: No    Sexual activity: Not on file   Other Topics Concern    Not on file   Social History Narrative    Not on file     Social Determinants of Health     Financial Resource Strain: Low Risk  (4/19/2023)    Overall Financial Resource Strain (CARDIA)     Difficulty of Paying Living Expenses: Not hard at all   Food Insecurity: No Food Insecurity (4/19/2023)    Hunger Vital Sign     Worried About Running Out of Food in the Last Year: Never true     Ran Out of Food in the Last Year: Never true   Transportation Needs: No Transportation Needs (4/19/2023)    PRAPARE - Transportation     Lack of Transportation (Medical): No     Lack of Transportation (Non-Medical): No   Physical Activity: Sufficiently Active (4/19/2023)    Exercise Vital Sign     Days of Exercise per Week: 6 days     Minutes of Exercise per Session: 60 min   Stress: Stress Concern Present (4/19/2023)    [employer] [place] [employer] - Occupational Stress Questionnaire      Feeling of Stress : To some extent   Social Connections: Unknown (4/19/2023)    Social Connection and Isolation Panel [NHANES]     Frequency of Communication with Friends and Family: Twice a week     Frequency of Social Gatherings with Friends and Family: Patient refused     Attends Judaism Services: 1 to 4 times per year     Active Member of Clubs or Organizations: No     Attends Club or Organization Meetings: Never     Marital Status:    Intimate Partner Violence: Not on file   Housing Stability: Low Risk  (4/19/2023)    Housing Stability Vital Sign     Unable to Pay for Housing in the Last Year: No     Number of Places Lived in the Last Year: 1     Unstable Housing in the Last Year: No       Family History   Problem Relation Age of Onset    Diabetes Mother     Stroke Mother     Cancer Father         None    Heart Disease Neg Hx        Current Outpatient Medications on File Prior to Visit   Medication Sig Dispense Refill    aspirin (ASA) 81 MG Chew Tab chewable tablet Chew 1 Tablet every day. 100 Tablet 3    apixaban (ELIQUIS) 5mg Tab Take 1 Tablet by mouth 2 times a day. 180 Tablet 3    rosuvastatin (CRESTOR) 10 MG Tab Take 1 Tablet by mouth every day. 90 Tablet 3    HYDROcodone/acetaminophen (NORCO)  MG Tab Take 1 Tablet by mouth every 6 hours as needed for Severe Pain.      varenicline (CHANTIX CONTINUING MONTH PAK) 1 MG tablet Take 1 Tablet by mouth 2 times a day. (Patient not taking: Reported on 5/15/2023) 60 Tablet 3    varenicline (CHANTIX STARTING MONTH PAK) 0.5 MG X 11 & 1 MG X 42 tablet Use as directed (Patient not taking: Reported on 5/15/2023) 56 Tablet 3     No current facility-administered medications on file prior to visit.       Cat hair extract and Xarelto [rivaroxaban]      ROS:   Review of Systems   Constitutional:  Negative for chills, diaphoresis, fever, malaise/fatigue and weight loss.   HENT:  Negative for congestion, ear discharge, ear pain, hearing loss, nosebleeds,  sinus pain, sore throat and tinnitus.    Eyes:  Negative for blurred vision, double vision, photophobia, pain, discharge and redness.   Respiratory:  Negative for cough, hemoptysis, sputum production, shortness of breath, wheezing and stridor.    Cardiovascular:  Negative for chest pain, palpitations, orthopnea, claudication, leg swelling and PND.   Gastrointestinal:  Negative for abdominal pain, constipation, diarrhea, heartburn, nausea and vomiting.   Genitourinary:  Negative for dysuria and urgency.   Musculoskeletal:  Negative for back pain, falls, joint pain, myalgias and neck pain.   Skin:  Negative for itching and rash.   Neurological:  Negative for dizziness, tremors, speech change, focal weakness, weakness and headaches.   Endo/Heme/Allergies:  Negative for environmental allergies.   Psychiatric/Behavioral:  Negative for depression.        /66 (BP Location: Right arm, Patient Position: Sitting, BP Cuff Size: Adult)   Pulse (!) 57   Ht 1.829 m (6')   Wt 80.3 kg (177 lb)   SpO2 94%   Physical Exam  Vitals reviewed.   Constitutional:       General: He is not in acute distress.     Appearance: Normal appearance. He is normal weight.   HENT:      Head: Normocephalic and atraumatic.      Right Ear: External ear normal.      Left Ear: External ear normal.      Nose: Nose normal. No congestion.      Mouth/Throat:      Mouth: Mucous membranes are moist.      Pharynx: Oropharynx is clear. No oropharyngeal exudate.   Eyes:      Extraocular Movements: Extraocular movements intact.      Conjunctiva/sclera: Conjunctivae normal.      Pupils: Pupils are equal, round, and reactive to light.   Cardiovascular:      Rate and Rhythm: Bradycardia present. Rhythm irregular.      Heart sounds: Normal heart sounds.   Pulmonary:      Effort: Pulmonary effort is normal. No respiratory distress.      Breath sounds: Normal breath sounds. No wheezing or rales.   Abdominal:      General: There is no distension.      Palpations:  Abdomen is soft.   Musculoskeletal:         General: Normal range of motion.      Cervical back: Normal range of motion and neck supple.      Right lower leg: No edema.      Left lower leg: No edema.   Skin:     General: Skin is warm and dry.      Findings: No rash.   Neurological:      Mental Status: He is alert and oriented to person, place, and time.      Cranial Nerves: No cranial nerve deficit.   Psychiatric:         Mood and Affect: Mood normal.         Behavior: Behavior normal.         PFTs as reviewed by me personally: as per hPI    Imaging as reviewed by me personally:  as per hPI    Assessment:  1. Pulmonary emphysema, unspecified emphysema type (HCC)        2. Pneumonia of right upper lobe due to infectious organism        3. Tobacco use            Plan:  Chronic, mild. Counseled on tobacco cessation. NO clear indication for controller therapy at present. MMRC 1  Resolved  Counseled on tobacco cessation; discuss lung CA screening next visit.   Return in about 1 year (around 5/15/2024) for COPD.

## 2023-05-30 ENCOUNTER — ANESTHESIA (OUTPATIENT)
Dept: CARDIOLOGY | Facility: MEDICAL CENTER | Age: 73
End: 2023-05-30
Payer: COMMERCIAL

## 2023-05-30 ENCOUNTER — HOSPITAL ENCOUNTER (OUTPATIENT)
Facility: MEDICAL CENTER | Age: 73
End: 2023-05-30
Attending: INTERNAL MEDICINE | Admitting: INTERNAL MEDICINE
Payer: COMMERCIAL

## 2023-05-30 ENCOUNTER — APPOINTMENT (OUTPATIENT)
Dept: CARDIOLOGY | Facility: MEDICAL CENTER | Age: 73
End: 2023-05-30
Attending: INTERNAL MEDICINE
Payer: COMMERCIAL

## 2023-05-30 ENCOUNTER — ANESTHESIA EVENT (OUTPATIENT)
Dept: CARDIOLOGY | Facility: MEDICAL CENTER | Age: 73
End: 2023-05-30
Payer: COMMERCIAL

## 2023-05-30 VITALS
WEIGHT: 175.71 LBS | HEIGHT: 72 IN | RESPIRATION RATE: 16 BRPM | SYSTOLIC BLOOD PRESSURE: 132 MMHG | BODY MASS INDEX: 23.8 KG/M2 | TEMPERATURE: 97 F | OXYGEN SATURATION: 95 % | DIASTOLIC BLOOD PRESSURE: 61 MMHG | HEART RATE: 69 BPM

## 2023-05-30 DIAGNOSIS — I48.3 TYPICAL ATRIAL FLUTTER (HCC): ICD-10-CM

## 2023-05-30 DIAGNOSIS — I48.0 PAF (PAROXYSMAL ATRIAL FIBRILLATION) (HCC): ICD-10-CM

## 2023-05-30 PROCEDURE — 160035 HCHG PACU - 1ST 60 MINS PHASE I

## 2023-05-30 PROCEDURE — 93319 3D ECHO IMG CGEN CAR ANOMAL: CPT

## 2023-05-30 PROCEDURE — 160002 HCHG RECOVERY MINUTES (STAT)

## 2023-05-30 PROCEDURE — 160046 HCHG PACU - 1ST 60 MINS PHASE II

## 2023-05-30 PROCEDURE — 93312 ECHO TRANSESOPHAGEAL: CPT | Mod: 26 | Performed by: INTERNAL MEDICINE

## 2023-05-30 PROCEDURE — 99100 ANES PT EXTEME AGE<1 YR&>70: CPT | Performed by: ANESTHESIOLOGY

## 2023-05-30 PROCEDURE — 700101 HCHG RX REV CODE 250: Performed by: ANESTHESIOLOGY

## 2023-05-30 PROCEDURE — 93319 3D ECHO IMG CGEN CAR ANOMAL: CPT | Performed by: INTERNAL MEDICINE

## 2023-05-30 PROCEDURE — 01922 ANES N-INVAS IMG/RADJ THER: CPT | Performed by: ANESTHESIOLOGY

## 2023-05-30 PROCEDURE — 93320 DOPPLER ECHO COMPLETE: CPT | Mod: 26 | Performed by: INTERNAL MEDICINE

## 2023-05-30 PROCEDURE — 700111 HCHG RX REV CODE 636 W/ 250 OVERRIDE (IP): Performed by: ANESTHESIOLOGY

## 2023-05-30 PROCEDURE — 700105 HCHG RX REV CODE 258: Performed by: INTERNAL MEDICINE

## 2023-05-30 RX ORDER — HYDRALAZINE HYDROCHLORIDE 20 MG/ML
5 INJECTION INTRAMUSCULAR; INTRAVENOUS
Status: DISCONTINUED | OUTPATIENT
Start: 2023-05-30 | End: 2023-05-30 | Stop reason: HOSPADM

## 2023-05-30 RX ORDER — HALOPERIDOL 5 MG/ML
1 INJECTION INTRAMUSCULAR
Status: DISCONTINUED | OUTPATIENT
Start: 2023-05-30 | End: 2023-05-30 | Stop reason: HOSPADM

## 2023-05-30 RX ORDER — LIDOCAINE HYDROCHLORIDE 20 MG/ML
INJECTION, SOLUTION EPIDURAL; INFILTRATION; INTRACAUDAL; PERINEURAL PRN
Status: DISCONTINUED | OUTPATIENT
Start: 2023-05-30 | End: 2023-05-30 | Stop reason: SURG

## 2023-05-30 RX ORDER — DIPHENHYDRAMINE HYDROCHLORIDE 50 MG/ML
12.5 INJECTION INTRAMUSCULAR; INTRAVENOUS
Status: DISCONTINUED | OUTPATIENT
Start: 2023-05-30 | End: 2023-05-30 | Stop reason: HOSPADM

## 2023-05-30 RX ORDER — LABETALOL HYDROCHLORIDE 5 MG/ML
5 INJECTION, SOLUTION INTRAVENOUS
Status: DISCONTINUED | OUTPATIENT
Start: 2023-05-30 | End: 2023-05-30 | Stop reason: HOSPADM

## 2023-05-30 RX ORDER — ONDANSETRON 2 MG/ML
4 INJECTION INTRAMUSCULAR; INTRAVENOUS
Status: DISCONTINUED | OUTPATIENT
Start: 2023-05-30 | End: 2023-05-30 | Stop reason: HOSPADM

## 2023-05-30 RX ORDER — SODIUM CHLORIDE, SODIUM LACTATE, POTASSIUM CHLORIDE, CALCIUM CHLORIDE 600; 310; 30; 20 MG/100ML; MG/100ML; MG/100ML; MG/100ML
INJECTION, SOLUTION INTRAVENOUS CONTINUOUS
Status: DISCONTINUED | OUTPATIENT
Start: 2023-05-30 | End: 2023-05-30 | Stop reason: HOSPADM

## 2023-05-30 RX ADMIN — PROPOFOL 50 MG: 10 INJECTION, EMULSION INTRAVENOUS at 09:05

## 2023-05-30 RX ADMIN — LIDOCAINE HYDROCHLORIDE 20 MG: 20 INJECTION, SOLUTION EPIDURAL; INFILTRATION; INTRACAUDAL at 09:05

## 2023-05-30 RX ADMIN — SODIUM CHLORIDE, POTASSIUM CHLORIDE, SODIUM LACTATE AND CALCIUM CHLORIDE: 600; 310; 30; 20 INJECTION, SOLUTION INTRAVENOUS at 08:54

## 2023-05-30 RX ADMIN — LIDOCAINE HYDROCHLORIDE 20 MG: 20 INJECTION, SOLUTION EPIDURAL; INFILTRATION; INTRACAUDAL at 09:11

## 2023-05-30 RX ADMIN — FENTANYL CITRATE 25 MCG: 50 INJECTION, SOLUTION INTRAMUSCULAR; INTRAVENOUS at 09:02

## 2023-05-30 RX ADMIN — PROPOFOL 50 MG: 10 INJECTION, EMULSION INTRAVENOUS at 09:12

## 2023-05-30 ASSESSMENT — FIBROSIS 4 INDEX: FIB4 SCORE: 1.61

## 2023-05-30 ASSESSMENT — PAIN SCALES - GENERAL: PAIN_LEVEL: 0

## 2023-05-30 NOTE — PROGRESS NOTES
Pt arrived to PACU stable. Pt remains A&Ox4, VSS, incisions are CDI, no belongings in PACU. wife called and updated on pt status and plan of care. Pt currently resting in bed in no acute distress.

## 2023-05-30 NOTE — OR NURSING
Received pt Ax 4  VSS in RA  DC instruction given  Belongings returned,   Swallow reflex intact, drinking water PO  Pain 0, nil n/v  Voided  Ambulatory and independent  Happy to go home when wife comes in

## 2023-05-30 NOTE — ANESTHESIA TIME REPORT
Anesthesia Start and Stop Event Times     Date Time Event    5/30/2023 0839 Ready for Procedure     0854 Anesthesia Start     0929 Anesthesia Stop        Responsible Staff  05/30/23    Name Role Begin End    Zuly Hill M.D. Anesth 0854 0975        Overtime Reason:  no overtime (within assigned shift)    Comments:

## 2023-05-30 NOTE — H&P
CC: s/p watchman    HPI: 73 year old man with atrial flutter s/p ablation with paroxysmal AF on loop recorder s/p watchman presents for post implant EDWAR. Without cardiac complaints today. Compliant with medications.     Medications / Drug list prior to admission:  No current facility-administered medications on file prior to encounter.     Current Outpatient Medications on File Prior to Encounter   Medication Sig Dispense Refill    apixaban (ELIQUIS) 5mg Tab Take 1 Tablet by mouth 2 times a day. 180 Tablet 3    rosuvastatin (CRESTOR) 10 MG Tab Take 1 Tablet by mouth every day. 90 Tablet 3    HYDROcodone/acetaminophen (NORCO)  MG Tab Take 1 Tablet by mouth every 6 hours as needed for Severe Pain.         Current list of administered Medications:    Current Facility-Administered Medications:     lactated ringers infusion, , Intravenous, Continuous, Parker PILAR Aguilar    Past Medical History:   Diagnosis Date    Arrhythmia     pt unaware    ASTHMA     uses inhaler once a day    Breath shortness     Bronchitis     Cataract 2022    IOL bilat    Coronary artery disease due to lipid rich plaque - CABG     High cholesterol     History of myocardial infarction     unaware of at the time    Implantable loop recorder present 04/04/2023    Pain 09/16/2022    intermittent in chest with exercise    Pneumonia     Postoperative atrial fibrillation (HCC) 2013 in setting of CABG     S/P coronary artery bypass graft x 5     Sleep apnea     no cpap    Snoring     Typical atrial flutter (HCC) sen 9/16/2022        Past Surgical History:   Procedure Laterality Date    CATARACT EXTRACTION WITH IOL Bilateral 2022    FISTULA IN ANO REPAIR  2018    MULTIPLE CORONARY ARTERY BYPASS ENDO VEIN HARVEST  12/07/2013    Performed by Mo Vincent M.D. at SURGERY [facility]    CHOLECYSTECTOMY  1996    OTHER CARDIAC SURGERY  2013    OTHER CARDIAC SURGERY      Loop Recorder       Family History   Problem Relation Age of Onset     Diabetes Mother     Stroke Mother     Cancer Father         None    Heart Disease Neg Hx      Patient family history was personally reviewed, no pertinent family history to current presentation    Social History     Socioeconomic History    Marital status:      Spouse name: Kaylee    Number of children: Not on file    Years of education: Not on file    Highest education level: Bachelor's degree (e.g., BA, AB, BS)   Occupational History     Employer: [employer]   Tobacco Use    Smoking status: Some Days     Packs/day: 0.50     Years: 55.00     Pack years: 27.50     Types: Cigarettes     Start date: 9/1/1968     Passive exposure: Past    Smokeless tobacco: Never    Tobacco comments:     I've quit many times over the years but I always start again   Vaping Use    Vaping Use: Never used   Substance and Sexual Activity    Alcohol use: Yes     Alcohol/week: 2.4 oz     Types: 4 Cans of beer per week     Comment: I usually will only drink once a week and it’s usually 3 or 4    Drug use: No    Sexual activity: Not on file   Other Topics Concern    Not on file   Social History Narrative    Not on file     Social Determinants of Health     Financial Resource Strain: Low Risk  (4/19/2023)    Overall Financial Resource Strain (CARDIA)     Difficulty of Paying Living Expenses: Not hard at all   Food Insecurity: No Food Insecurity (4/19/2023)    Hunger Vital Sign     Worried About Running Out of Food in the Last Year: Never true     Ran Out of Food in the Last Year: Never true   Transportation Needs: No Transportation Needs (4/19/2023)    PRAPARE - Transportation     Lack of Transportation (Medical): No     Lack of Transportation (Non-Medical): No   Physical Activity: Sufficiently Active (4/19/2023)    Exercise Vital Sign     Days of Exercise per Week: 6 days     Minutes of Exercise per Session: 60 min   Stress: Stress Concern Present (4/19/2023)    [employer] [employer]ational [employer] - Occupational Stress Questionnaire      Feeling of Stress : To some extent   Social Connections: Unknown (4/19/2023)    Social Connection and Isolation Panel [NHANES]     Frequency of Communication with Friends and Family: Twice a week     Frequency of Social Gatherings with Friends and Family: Patient refused     Attends Oriental orthodox Services: 1 to 4 times per year     Active Member of Clubs or Organizations: No     Attends Club or Organization Meetings: Never     Marital Status:    Intimate Partner Violence: Not on file   Housing Stability: Low Risk  (4/19/2023)    Housing Stability Vital Sign     Unable to Pay for Housing in the Last Year: No     Number of Places Lived in the Last Year: 1     Unstable Housing in the Last Year: No       ALLERGIES:  Allergies   Allergen Reactions    Cat Hair Extract Runny Nose and Itching     .    Xarelto [Rivaroxaban]      nausea       Review of systems:  A complete review of symptoms was reviewed with patient. This is reviewed in H&P and PMH. ALL OTHERS reviewed and negative    Physical exam:  Patient Vitals for the past 24 hrs:   BP Temp Temp src Pulse Resp SpO2 Height Weight   05/30/23 0711 126/67 36.3 °C (97.4 °F) Temporal (!) 58 14 93 % 1.829 m (6') 79.7 kg (175 lb 11.3 oz)     General: No acute distress.   EYES: no jaundice  HEENT: OP clear   Neck: No bruits No JVD.   CVS:  RRR. S1 + S2. No M/R/G. No edema.  Resp: CTAB. No wheezing or crackles/rhonchi.  Abdomen: Soft, NT, ND,  Skin: Grossly nothing acute no obvious rashes  Neurological: Alert, Moves all extremities, no cranial nerve defects on limited exam  Extremities: Pulse 2+ in b/l LE. No cyanosis.     Data:  Laboratory studies personally reviewed by me:  No results found for this or any previous visit (from the past 24 hour(s)).      All pertinent features of laboratory and imaging reviewed including primary images where applicable      Active Problems:    * No active hospital problems. *  Resolved Problems:    * No resolved hospital problems.  *      Assessment / Plan:  73 year old man with atrial flutter s/p ablation with paroxysmal AF on loop recorder s/p watchman presents for post implant EDWAR.     -EDWAR  -follow up EP for further management    It is my pleasure to participate in the care of Mr. Gomez.  Please do not hesitate to contact me with questions or concerns.    Parker Aguilar MD  Cardiologist [facility]

## 2023-05-30 NOTE — DISCHARGE INSTRUCTIONS
HOME CARE INSTRUCTIONS    ACTIVITY: Rest and take it easy for the first 24 hours.  A responsible adult is recommended to remain with you during that time.  It is normal to feel sleepy.  We encourage you to not do anything that requires balance, judgment or coordination.    FOR 24 HOURS DO NOT:  Drive, operate machinery or run household appliances.  Drink beer or alcoholic beverages.  Make important decisions or sign legal documents.    SPECIAL INSTRUCTIONS: ENDOSCOPY HOME CARE INSTRUCTIONS    EDWRA - TRANSESOPHAGEAL ECHOCARDIOGRAM  1. Don't drive or drink alcohol for 24 hours. The medication you received will make you too drowsy.  2. If you begin to vomit bloody material, or develop black or bloody stools, call your doctor as soon as possible.  3. If you have any neck, chest, abdominal pain or temp of 100 degrees, call your doctor.    DIET: To avoid nausea, slowly advance diet as tolerated, avoiding spicy or greasy foods for the first day.  Add more substantial food to your diet according to your physician's instructions.  Babies can be fed formula or breast milk as soon as they are hungry.  INCREASE FLUIDS AND FIBER TO AVOID CONSTIPATION.    SURGICAL DRESSING/BATHING: okay to shower tomorrow    MEDICATIONS: Resume taking daily medication.  Take prescribed pain medication with food.  If no medication is prescribed, you may take non-aspirin pain medication if needed.  PAIN MEDICATION CAN BE VERY CONSTIPATING.  Take a stool softener or laxative such as senokot, pericolace, or milk of magnesia if needed.    Prescription given for __.  Last pain medication given at __.    A follow-up appointment should be arranged with your doctor; call to schedule.    You should CALL YOUR PHYSICIAN if you develop:  Fever greater than 101 degrees F.  Pain not relieved by medication, or persistent nausea or vomiting.  Excessive bleeding (blood soaking through dressing) or unexpected drainage from the wound.  Extreme redness or swelling  around the incision site, drainage of pus or foul smelling drainage.  Inability to urinate or empty your bladder within 8 hours.  Problems with breathing or chest pain.    You should call 911 if you develop problems with breathing or chest pain.  If you are unable to contact your doctor or surgical center, you should go to the nearest emergency room or urgent care center.  Physician's telephone #: [phone]    MILD FLU-LIKE SYMPTOMS ARE NORMAL.  YOU MAY EXPERIENCE GENERALIZED MUSCLE ACHES, THROAT IRRITATION, HEADACHE AND/OR SOME NAUSEA.    If any questions arise, call your doctor.  If your doctor is not available, please feel free to call the Surgical Center at [phone].  The Center is open Monday through Friday from 7AM to 7PM.      A registered nurse may call you a few days after your surgery to see how you are doing after your procedure.    You may also receive a survey in the mail within the next two weeks and we ask that you take a few moments to complete the survey and return it to us.  Our goal is to provide you with very good care and we value your comments.     Depression / Suicide Risk    As you are discharged from this [facility] facility, it is important to learn how to keep safe from harming yourself.    Recognize the warning signs:  Abrupt changes in personality, positive or negative- including increase in energy   Giving away possessions  Change in eating patterns- significant weight changes-  positive or negative  Change in sleeping patterns- unable to sleep or sleeping all the time   Unwillingness or inability to communicate  Depression  Unusual sadness, discouragement and loneliness  Talk of wanting to die  Neglect of personal appearance   Rebelliousness- reckless behavior  Withdrawal from people/activities they love  Confusion- inability to concentrate     If you or a loved one observes any of these behaviors or has concerns about self-harm, here's what you can do:  Talk about it- your  feelings and reasons for harming yourself  Remove any means that you might use to hurt yourself (examples: pills, rope, extension cords, firearm)  Get professional help from the community (Mental Health, Substance Abuse, psychological counseling)  Do not be alone:Call your Safe Contact- someone whom you trust who will be there for you.  Call your local CRISIS HOTLINE 144-2884 or 681-157-4759  Call your local Children's Mobile Crisis Response Team [place] (717) 433-8969 or www.appweevr  Call the toll free National Suicide Prevention Hotlines   National Suicide Prevention Lifeline 830-282-UPNT (7282)  National Hope Line Network 800-SUICIDE (600-4365)    I acknowledge receipt and understanding of these Home Care instructions.

## 2023-05-30 NOTE — ANESTHESIA PREPROCEDURE EVALUATION
Date/Time: 05/30/23 0900    Scheduled providers: Parker Aguilar M.D.; Zuly Hill M.D.    Procedure: EC-EDWAR W/O CONT    Diagnosis:       Typical atrial flutter (HCC) [I48.3]      PAF (paroxysmal atrial fibrillation) (HCC) [I48.0]      Presence of other cardiac implants and grafts [Z95.818]    Location: [facility] IMAG[facility] - ECHOCARDIOLOGY [facility]          Relevant Problems   CARDIAC   (positive) Coronary artery disease due to lipid rich plaque - CABG   (positive) Postoperative atrial fibrillation (HCC) 2013 in setting of CABG   (positive) Primary hypertension   (positive) Typical atrial flutter (HCC) seen 9/16/2022   (positive) Venous insufficiency of leg       Physical Exam    Airway   Mallampati: I  TM distance: >3 FB       Cardiovascular   Rhythm: regular  Rate: abnormal     Dental - normal exam           Pulmonary   (+) decreased breath sounds     Abdominal - normal exam     Neurological              Anesthesia Plan    ASA 3   ASA physical status 3 criteria: CAD/stents (> 3 months)    Plan - general and MAC       Airway plan will be natural airway          Induction: intravenous      Pertinent diagnostic labs and testing reviewed    Informed Consent:    Anesthetic plan and risks discussed with patient.    Use of blood products discussed with: whom consented to blood products.

## 2023-05-30 NOTE — ANESTHESIA POSTPROCEDURE EVALUATION
Patient: Hugo Gomez    Procedure Summary     Date: 05/30/23 Room / Location: [facility] - ECHOCARDIOLOGY [facility]    Anesthesia Start: 0854 Anesthesia Stop: 0929    Procedure: EC-EDWAR W/O CONT Diagnosis:       Typical atrial flutter (HCC)      PAF (paroxysmal atrial fibrillation) (HCC)      Presence of other cardiac implants and grafts    Scheduled Providers: Parker Aguilar M.D.; Zuly Hill M.D. Responsible Provider: Zuly Hill M.D.    Anesthesia Type: general, MAC ASA Status: 3          Final Anesthesia Type: general, MAC  Last vitals  BP   Blood Pressure : 132/61    Temp   36.1 °C (97 °F)    Pulse   69   Resp   16    SpO2   95 %      Anesthesia Post Evaluation    Patient location during evaluation: PACU  Patient participation: complete - patient participated  Level of consciousness: awake and alert  Pain score: 0    Airway patency: patent  Anesthetic complications: no  Cardiovascular status: adequate and hemodynamically stable  Respiratory status: acceptable  Hydration status: acceptable    PONV: none          No notable events documented.     Nurse Pain Score: 0 (NPRS)

## 2023-06-01 ENCOUNTER — OFFICE VISIT (OUTPATIENT)
Dept: CARDIOLOGY | Facility: MEDICAL CENTER | Age: 73
End: 2023-06-01
Attending: NURSE PRACTITIONER
Payer: COMMERCIAL

## 2023-06-01 VITALS
WEIGHT: 177 LBS | OXYGEN SATURATION: 97 % | HEIGHT: 72 IN | BODY MASS INDEX: 23.98 KG/M2 | SYSTOLIC BLOOD PRESSURE: 116 MMHG | HEART RATE: 55 BPM | RESPIRATION RATE: 16 BRPM | DIASTOLIC BLOOD PRESSURE: 72 MMHG

## 2023-06-01 DIAGNOSIS — Z95.818 PRESENCE OF WATCHMAN LEFT ATRIAL APPENDAGE CLOSURE DEVICE: ICD-10-CM

## 2023-06-01 DIAGNOSIS — I25.83 CORONARY ARTERY DISEASE DUE TO LIPID RICH PLAQUE: Chronic | ICD-10-CM

## 2023-06-01 DIAGNOSIS — I48.0 PAROXYSMAL ATRIAL FIBRILLATION (HCC): ICD-10-CM

## 2023-06-01 DIAGNOSIS — E78.5 DYSLIPIDEMIA: Chronic | ICD-10-CM

## 2023-06-01 DIAGNOSIS — Z98.890 H/O CARDIAC RADIOFREQUENCY ABLATION: ICD-10-CM

## 2023-06-01 DIAGNOSIS — I25.10 CORONARY ARTERY DISEASE DUE TO LIPID RICH PLAQUE: Chronic | ICD-10-CM

## 2023-06-01 DIAGNOSIS — I48.3 TYPICAL ATRIAL FLUTTER (HCC): Chronic | ICD-10-CM

## 2023-06-01 DIAGNOSIS — Z95.1 S/P CORONARY ARTERY BYPASS GRAFT X 5: ICD-10-CM

## 2023-06-01 PROCEDURE — 99214 OFFICE O/P EST MOD 30 MIN: CPT | Mod: 25 | Performed by: NURSE PRACTITIONER

## 2023-06-01 PROCEDURE — 93288 INTERROG EVL PM/LDLS PM IP: CPT | Performed by: NURSE PRACTITIONER

## 2023-06-01 PROCEDURE — 3074F SYST BP LT 130 MM HG: CPT | Performed by: NURSE PRACTITIONER

## 2023-06-01 PROCEDURE — 93010 ELECTROCARDIOGRAM REPORT: CPT | Mod: 59 | Performed by: NURSE PRACTITIONER

## 2023-06-01 PROCEDURE — 99211 OFF/OP EST MAY X REQ PHY/QHP: CPT | Performed by: NURSE PRACTITIONER

## 2023-06-01 PROCEDURE — 3078F DIAST BP <80 MM HG: CPT | Performed by: NURSE PRACTITIONER

## 2023-06-01 PROCEDURE — 93005 ELECTROCARDIOGRAM TRACING: CPT | Performed by: NURSE PRACTITIONER

## 2023-06-01 ASSESSMENT — ENCOUNTER SYMPTOMS
HEMOPTYSIS: 0
PSYCHIATRIC NEGATIVE: 1
WHEEZING: 0
BLOOD IN STOOL: 0
MUSCULOSKELETAL NEGATIVE: 1
SHORTNESS OF BREATH: 0
TREMORS: 0
HEADACHES: 0
NAUSEA: 0
HEARTBURN: 0
PALPITATIONS: 0
SPEECH CHANGE: 0
TINGLING: 0
WEIGHT LOSS: 0
PND: 0
ORTHOPNEA: 0
SENSORY CHANGE: 0
CHILLS: 0
FOCAL WEAKNESS: 0
SPUTUM PRODUCTION: 0
COUGH: 0
LOSS OF CONSCIOUSNESS: 0
FEVER: 0
DIZZINESS: 0
VOMITING: 0

## 2023-06-01 ASSESSMENT — FIBROSIS 4 INDEX: FIB4 SCORE: 1.61

## 2023-06-01 NOTE — PROGRESS NOTES
Chief Complaint   Patient presents with    Atrial Flutter     F/v Dx:Typical atrial flutter (HCC)       Subjective     Sebastian Gomez is a 73 y.o. male who presents today for procedural follow-up after elective PRESTON closure with WATCHMAN implant by Dr. Catrachito Ferrell 4/19/2023.  Watchman implanted secondary to indication for long-term anticoagulation with history of paroxysmal atrial fibrillation and OAC intolerance.    Post watchman 45-day EDWAR completed 5/30/2023.      He is followed by Dr. Zurdo Stephenson for cardiology and Dr. Catrachito Ferrell for EP.  Additional medical history significant for ablation of atrial flutter 12/7/2022 with Dr. Ferrell, status post ILR implant for AF screening, CAD status post CABG in 2013, cath in September 2022 found to have patent LIMA and vein grafts, tiny occluded diagonal.  Additionally with hypertension.    Today in follow-up Sebastian reports that he continues to feel fatigued, mostly in the mornings, when he wakes up and post gym.  After this reports normally feels fine throughout the rest of the day.  He does not report feelings of tachycardias or palpitations.  No chest pain/angina.  No shortness of breath.  No dizziness presyncope or syncope.      Past Medical History:   Diagnosis Date    Arrhythmia     pt unaware    ASTHMA     uses inhaler once a day    Breath shortness     Bronchitis     Cataract 2022    IOL bilat    Coronary artery disease due to lipid rich plaque - CABG     High cholesterol     History of myocardial infarction     unaware of at the time    Implantable loop recorder present 04/04/2023    Pain 09/16/2022    intermittent in chest with exercise    Pneumonia     Postoperative atrial fibrillation (HCC) 2013 in setting of CABG     S/P coronary artery bypass graft x 5     Sleep apnea     no cpap    Snoring     Typical atrial flutter (HCC) sen 9/16/2022      Past Surgical History:   Procedure Laterality Date    CATARACT EXTRACTION WITH IOL Bilateral 2022    FISTULA IN ANO  REPAIR  2018    MULTIPLE CORONARY ARTERY BYPASS ENDO VEIN HARVEST  12/07/2013    Performed by Mo Vincent M.D. at [facility]    CHOLECYSTECTOMY  1996    OTHER CARDIAC SURGERY  2013    OTHER CARDIAC SURGERY      Loop Recorder     Family History   Problem Relation Age of Onset    Diabetes Mother     Stroke Mother     Cancer Father         None    Heart Disease Neg Hx      Social History     Socioeconomic History    Marital status:      Spouse name: Kaylee    Number of children: Not on file    Years of education: Not on file    Highest education level: Bachelor's degree (e.g., BA, AB, BS)   Occupational History     Employer: [employer]   Tobacco Use    Smoking status: Some Days     Packs/day: 0.50     Years: 55.00     Pack years: 27.50     Types: Cigarettes     Start date: 9/1/1968     Passive exposure: Past    Smokeless tobacco: Never    Tobacco comments:     I've quit many times over the years but I always start again   Vaping Use    Vaping Use: Never used   Substance and Sexual Activity    Alcohol use: Yes     Alcohol/week: 2.4 oz     Types: 4 Cans of beer per week     Comment: I usually will only drink once a week and it’s usually 3 or 4    Drug use: No    Sexual activity: Not on file   Other Topics Concern    Not on file   Social History Narrative    Not on file     Social Determinants of Health     Financial Resource Strain: Low Risk  (4/19/2023)    Overall Financial Resource Strain (CARDIA)     Difficulty of Paying Living Expenses: Not hard at all   Food Insecurity: No Food Insecurity (4/19/2023)    Hunger Vital Sign     Worried About Running Out of Food in the Last Year: Never true     Ran Out of Food in the Last Year: Never true   Transportation Needs: No Transportation Needs (4/19/2023)    PRAPARE - Transportation     Lack of Transportation (Medical): No     Lack of Transportation (Non-Medical): No   Physical Activity: Sufficiently Active (4/19/2023)    Exercise Vital Sign     Days of  Exercise per Week: 6 days     Minutes of Exercise per Session: 60 min   Stress: Stress Concern Present (4/19/2023)    [place] [employer] - Occupational Stress Questionnaire     Feeling of Stress : To some extent   Social Connections: Unknown (4/19/2023)    Social Connection and Isolation Panel [NHANES]     Frequency of Communication with Friends and Family: Twice a week     Frequency of Social Gatherings with Friends and Family: Patient refused     Attends Gnosticist Services: 1 to 4 times per year     Active Member of Clubs or Organizations: No     Attends Club or Organization Meetings: Never     Marital Status:    Intimate Partner Violence: Not on file   Housing Stability: Low Risk  (4/19/2023)    Housing Stability Vital Sign     Unable to Pay for Housing in the Last Year: No     Number of Places Lived in the Last Year: 1     Unstable Housing in the Last Year: No     Allergies   Allergen Reactions    Cat Hair Extract Runny Nose and Itching     .    Xarelto [Rivaroxaban]      nausea     Outpatient Encounter Medications as of 6/1/2023   Medication Sig Dispense Refill    aspirin (ASA) 81 MG Chew Tab chewable tablet Chew 1 Tablet every day. 100 Tablet 3    apixaban (ELIQUIS) 5mg Tab Take 1 Tablet by mouth 2 times a day. 180 Tablet 3    rosuvastatin (CRESTOR) 10 MG Tab Take 1 Tablet by mouth every day. 90 Tablet 3    HYDROcodone/acetaminophen (NORCO)  MG Tab Take 1 Tablet by mouth every 6 hours as needed for Severe Pain.       No facility-administered encounter medications on file as of 6/1/2023.     Review of Systems   Constitutional:  Positive for malaise/fatigue. Negative for chills, fever and weight loss.   HENT:  Negative for nosebleeds and tinnitus.    Respiratory:  Negative for cough, hemoptysis, sputum production, shortness of breath and wheezing.    Cardiovascular:  Negative for chest pain, palpitations, orthopnea, leg swelling and PND.   Gastrointestinal:  Negative for blood  in stool, heartburn, nausea and vomiting.   Genitourinary:  Negative for hematuria.   Musculoskeletal: Negative.    Neurological:  Negative for dizziness, tingling, tremors, sensory change, speech change, focal weakness, loss of consciousness and headaches.   Psychiatric/Behavioral: Negative.                Objective     /72 (BP Location: Left arm, Patient Position: Sitting, BP Cuff Size: Adult)   Pulse (!) 55   Resp 16   Ht 1.829 m (6')   Wt 80.3 kg (177 lb)   SpO2 97%   BMI 24.01 kg/m²     Physical Exam  Vitals reviewed.   Constitutional:       Appearance: Normal appearance. He is well-developed.   HENT:      Head: Normocephalic and atraumatic.      Mouth/Throat:      Mouth: Mucous membranes are moist.      Pharynx: Oropharynx is clear.   Eyes:      Extraocular Movements: Extraocular movements intact.      Conjunctiva/sclera: Conjunctivae normal.      Pupils: Pupils are equal, round, and reactive to light.   Neck:      Vascular: No JVD.   Cardiovascular:      Rate and Rhythm: Normal rate and regular rhythm.      Pulses: Normal pulses.      Heart sounds: Normal heart sounds. No murmur heard.     No friction rub. No gallop.   Pulmonary:      Effort: Pulmonary effort is normal.      Breath sounds: Normal breath sounds. No wheezing or rales.   Chest:      Chest wall: No tenderness.   Musculoskeletal:         General: Normal range of motion.      Cervical back: Normal range of motion and neck supple.      Right lower leg: No edema.      Left lower leg: No edema.   Skin:     General: Skin is warm and dry.      Capillary Refill: Capillary refill takes 2 to 3 seconds.   Neurological:      Mental Status: He is alert and oriented to person, place, and time.   Psychiatric:         Mood and Affect: Mood normal.         Behavior: Behavior normal.         Thought Content: Thought content normal.         Judgment: Judgment normal.                Assessment & Plan     1. Presence of Watchman left atrial appendage  closure device        2. Typical atrial flutter (HCC) seen 9/16/2022  EKG      3. H/O cardiac radiofrequency ablation. 12/7/22. Dr Ferrell         4. Paroxysmal atrial fibrillation (HCC)        5. Coronary artery disease due to lipid rich plaque - CABG        6. Dyslipidemia        7. S/P coronary artery bypass graft x 5            Medical Decision Making: Today's Assessment/Status/Plan:   1.  PRESTON closure   - Status post WATCHMAN implant 4/19/2023 by Dr. Ferrell.  -45-day post watchman EDWAR reveals well-seated device without recurrent peridevice flow.  We discussed can discontinue close at this time.  To continue enteric-coated aspirin 81 mg daily lifelong.  -Device implanted secondary to side effect from medication.    2.  Typical atrial flutter  - Status post ablation 12/7/2022.    3.  Paroxysmal atrial fibrillation  - ILR interrogated today with approximately 2% paroxysmal AF burden.  Longest episode approximately 3 hours.  He was not symptomatic to this episode.  -We will continue to monitor for burden.  May come to the point of needing antiarrhythmics or ablation however, currently I do not think his daily symptoms are correlated to intermittent atrial fibrillation.  -Reports he has been previously diagnosed with obstructive sleep apnea.  He is currently not treated.  We did discuss untreated ZHANE may be potentially contributing to daily feelings of fatigue.  He defers referral for sleep medicine clinic at this time as he would like to see how he does off of anticoagulation first.    4.  CAD  5.  Dyslipidemia  - Status post 5V CABG.  No reports of chest pain or angina.  -He is on Crestor 10 mg to continue.  For repeat lipids later this summer.  Will order after next appointment.  -Reports active lifestyle with morning gym workout.    -  Return to clinic in 2 months for A-fib/symptom review.  Sooner if clinical condition changes.  He will contact our office should questions or concerns arise.    PLEASE NOTE: This Note  was created using voice recognition Software. I have made every reasonable attempt to correct obvious errors, but I expect that there are errors of grammar and possibly content that I did not discover before finalizing the note

## 2023-06-10 LAB — EKG IMPRESSION: NORMAL

## 2023-06-11 ENCOUNTER — NON-PROVIDER VISIT (OUTPATIENT)
Dept: CARDIOLOGY | Facility: MEDICAL CENTER | Age: 73
End: 2023-06-11
Payer: COMMERCIAL

## 2023-06-11 PROCEDURE — 93298 REM INTERROG DEV EVAL SCRMS: CPT | Performed by: INTERNAL MEDICINE

## 2023-06-12 NOTE — CARDIAC REMOTE MONITOR - SCAN
Device transmission reviewed. Device demonstrated appropriate function. Some artifact.      Electronically Signed by: Ivan Kelly M.D.    6/16/2023  5:54 PM

## 2023-07-12 ENCOUNTER — NON-PROVIDER VISIT (OUTPATIENT)
Dept: CARDIOLOGY | Facility: MEDICAL CENTER | Age: 73
End: 2023-07-12
Payer: COMMERCIAL

## 2023-07-12 PROCEDURE — 93298 REM INTERROG DEV EVAL SCRMS: CPT | Performed by: INTERNAL MEDICINE

## 2023-07-12 NOTE — CARDIAC REMOTE MONITOR - SCAN
Device transmission reviewed. Device demonstrated appropriate function.       Electronically Signed by: Ivan Kelly M.D.    7/20/2023  2:45 PM

## 2023-08-03 DIAGNOSIS — I25.10 CORONARY ARTERY DISEASE DUE TO LIPID RICH PLAQUE: ICD-10-CM

## 2023-08-03 DIAGNOSIS — I25.83 CORONARY ARTERY DISEASE DUE TO LIPID RICH PLAQUE: ICD-10-CM

## 2023-08-07 NOTE — PROGRESS NOTES
Chief Complaint   Patient presents with    Atrial Flutter     F/V Dx: Typical atrial flutter        Subjective     Bill Roland Gomez is a 73 y.o. male who presents today for follow up ILR and atrial arrhythmias.     He is followed by Dr. Ferrell for EP and Dr Stephenson for cardiology.      Additional medical history significant for prior Watchman (4/19/23), prior ablation of atrial flutter 12/7/22, cad s/p cabg 2013, htn.     Today in follow-up she reports overall doing well.  Since her last office visit he has come off anticoagulation following watchman implant.  He does report feeling improved off blood thinners with movement of his prior symptoms.    He reports that he is worried about the trajectory of his atrial fibrillation and would like to discuss further management.    Past Medical History:   Diagnosis Date    Arrhythmia     pt unaware    ASTHMA     uses inhaler once a day    Breath shortness     Bronchitis     Cataract 2022    IOL bilat    Coronary artery disease due to lipid rich plaque - CABG     High cholesterol     History of myocardial infarction     unaware of at the time    Implantable loop recorder present 04/04/2023    Pain 09/16/2022    intermittent in chest with exercise    Pneumonia     Postoperative atrial fibrillation (HCC) 2013 in setting of CABG     S/P coronary artery bypass graft x 5     Sleep apnea     no cpap    Snoring     Typical atrial flutter (HCC) sen 9/16/2022      Past Surgical History:   Procedure Laterality Date    CATARACT EXTRACTION WITH IOL Bilateral 2022    FISTULA IN ANO REPAIR  2018    MULTIPLE CORONARY ARTERY BYPASS ENDO VEIN HARVEST  12/07/2013    Performed by Mo Vincent M.D. at SURGERY [facility]    CHOLECYSTECTOMY  1996    OTHER CARDIAC SURGERY  2013    OTHER CARDIAC SURGERY      Loop Recorder     Family History   Problem Relation Age of Onset    Diabetes Mother     Stroke Mother     Cancer Father         None    Heart Disease Neg Hx      Social History      Socioeconomic History    Marital status:      Spouse name: Kaylee    Number of children: Not on file    Years of education: Not on file    Highest education level: Bachelor's degree (e.g., BA, AB, BS)   Occupational History     Employer: [employer]   Tobacco Use    Smoking status: Some Days     Packs/day: 0.50     Years: 55.00     Pack years: 27.50     Types: Cigarettes     Start date: 9/1/1968     Passive exposure: Past    Smokeless tobacco: Never    Tobacco comments:     I've quit many times over the years but I always start again   Vaping Use    Vaping Use: Never used   Substance and Sexual Activity    Alcohol use: Yes     Alcohol/week: 2.4 oz     Types: 4 Cans of beer per week     Comment: I usually will only drink once a week and it’s usually 3 or 4    Drug use: No    Sexual activity: Not on file   Other Topics Concern    Not on file   Social History Narrative    Not on file     Social Determinants of Health     Financial Resource Strain: Low Risk  (4/19/2023)    Overall Financial Resource Strain (CARDIA)     Difficulty of Paying Living Expenses: Not hard at all   Food Insecurity: No Food Insecurity (4/19/2023)    Hunger Vital Sign     Worried About Running Out of Food in the Last Year: Never true     Ran Out of Food in the Last Year: Never true   Transportation Needs: No Transportation Needs (4/19/2023)    PRAPARE - Transportation     Lack of Transportation (Medical): No     Lack of Transportation (Non-Medical): No   Physical Activity: Sufficiently Active (4/19/2023)    Exercise Vital Sign     Days of Exercise per Week: 6 days     Minutes of Exercise per Session: 60 min   Stress: Stress Concern Present (4/19/2023)    [employer] [place] [employer] - Occupational Stress Questionnaire     Feeling of Stress : To some extent   Social Connections: Unknown (4/19/2023)    Social Connection and Isolation Panel [NHANES]     Frequency of Communication with Friends and Family: Twice a week     Frequency  of Social Gatherings with Friends and Family: Patient refused     Attends Yazidi Services: 1 to 4 times per year     Active Member of Clubs or Organizations: No     Attends Club or Organization Meetings: Never     Marital Status:    Intimate Partner Violence: Not on file   Housing Stability: Low Risk  (4/19/2023)    Housing Stability Vital Sign     Unable to Pay for Housing in the Last Year: No     Number of Places Lived in the Last Year: 1     Unstable Housing in the Last Year: No     Allergies   Allergen Reactions    Cat Hair Extract Runny Nose and Itching     .    Xarelto [Rivaroxaban]      nausea     Outpatient Encounter Medications as of 8/8/2023   Medication Sig Dispense Refill    aspirin (ASA) 81 MG Chew Tab chewable tablet Chew 1 Tablet every day. 100 Tablet 3    rosuvastatin (CRESTOR) 10 MG Tab Take 1 Tablet by mouth every day. 90 Tablet 3    HYDROcodone/acetaminophen (NORCO)  MG Tab Take 1 Tablet by mouth every 6 hours as needed for Severe Pain.       No facility-administered encounter medications on file as of 8/8/2023.     Review of Systems   Constitutional:  Negative for chills, fever, malaise/fatigue and weight loss.   HENT:  Negative for tinnitus.    Eyes:  Negative for blurred vision and double vision.   Respiratory:  Negative for cough, hemoptysis, sputum production, shortness of breath and wheezing.    Cardiovascular:  Negative for chest pain, palpitations, orthopnea, leg swelling and PND.   Gastrointestinal:  Negative for blood in stool, heartburn, nausea and vomiting.   Genitourinary:  Negative for hematuria.   Skin:  Negative for rash.   Neurological:  Negative for dizziness, tingling, tremors, sensory change, speech change, focal weakness, loss of consciousness and headaches.              Objective     /62 (BP Location: Left arm, Patient Position: Sitting, BP Cuff Size: Adult)   Pulse (!) 56   Resp 16   Ht 1.829 m (6')   Wt 78.5 kg (173 lb)   SpO2 96%   BMI 23.46  kg/m²     Physical Exam  Vitals reviewed.   Constitutional:       Appearance: Normal appearance. He is well-developed.   HENT:      Head: Normocephalic and atraumatic.      Mouth/Throat:      Mouth: Mucous membranes are moist.      Pharynx: Oropharynx is clear.   Eyes:      Extraocular Movements: Extraocular movements intact.      Conjunctiva/sclera: Conjunctivae normal.      Pupils: Pupils are equal, round, and reactive to light.   Neck:      Vascular: No JVD.   Cardiovascular:      Rate and Rhythm: Normal rate and regular rhythm.      Pulses: Normal pulses.      Heart sounds: Normal heart sounds. No murmur heard.     No friction rub. No gallop.   Pulmonary:      Effort: Pulmonary effort is normal.      Breath sounds: Normal breath sounds. No wheezing or rales.   Chest:      Chest wall: No tenderness.   Musculoskeletal:         General: Normal range of motion.      Cervical back: Normal range of motion and neck supple.      Right lower leg: No edema.      Left lower leg: No edema.   Skin:     General: Skin is warm and dry.      Capillary Refill: Capillary refill takes 2 to 3 seconds.   Neurological:      Mental Status: He is alert and oriented to person, place, and time.   Psychiatric:         Mood and Affect: Mood normal.         Behavior: Behavior normal.         Thought Content: Thought content normal.         Judgment: Judgment normal.       Twelve-lead EKG 8/8/2023: Sinus rhythm         Assessment & Plan     1. Typical atrial flutter (HCC)  EKG      2. H/O cardiac radiofrequency ablation. 12/7/22. Dr Ferrell         3. Postoperative atrial fibrillation (HCC) 2013 in setting of CABG        4. Status post placement of implantable loop recorder        5. Coronary artery disease due to lipid rich plaque - CABG        6. Presence of Watchman left atrial appendage closure device            Medical Decision Making: Today's Assessment/Status/Plan:   1.  Typical atrial flutter  2.  Typical flutter ablation  - Status post  typical flutter ablation 12/7/2022.  ILR was implanted post ablation to screen for atrial fibrillation.    3.  Atrial fibrillation  4.  ILR  - ILR interrogated today.  With approximately 2.8% atrial fibrillation burden since last office interrogation.  -He reports he is not overly symptomatic to these events.  We discussed atrial fibrillation in length today.  He reports that he would like to treat his atrial fibrillation to reduce risk of persistent atrial fibrillation in the future.  We discussed potential medication for suppression of atrial fibrillation, he prefers to not start a medication at this time.  We did review ablation.  He is most interested in this and would like to discuss ablation options with Dr. Ferrell I will refer him back to Dr. Ferrell for this.    4.  CAD/CABG  - No report of chest pain/angina.  Works out 6 days a week.  -Discussed to continue aspirin monotherapy 81 mg daily.  To continue Crestor 10 mg daily.  -he is due for repeat lipid profile.  I have ordered.    5.  Watchman  - Status post successful watchman implant and follow-up EDWAR with well-seated device without significant peridevice flow.   - Now on ASA monotherapy lifelong.    Return to clinic with Dr. Ferrell next available, sooner if clinical condition changes.    PLEASE NOTE: This Note was created using voice recognition Software. I have made every reasonable attempt to correct obvious errors, but I expect that there are errors of grammar and possibly content that I did not discover before finalizing the note

## 2023-08-08 ENCOUNTER — OFFICE VISIT (OUTPATIENT)
Dept: CARDIOLOGY | Facility: MEDICAL CENTER | Age: 73
End: 2023-08-08
Attending: NURSE PRACTITIONER
Payer: COMMERCIAL

## 2023-08-08 VITALS
OXYGEN SATURATION: 96 % | SYSTOLIC BLOOD PRESSURE: 112 MMHG | WEIGHT: 173 LBS | DIASTOLIC BLOOD PRESSURE: 62 MMHG | RESPIRATION RATE: 16 BRPM | HEART RATE: 56 BPM | BODY MASS INDEX: 23.43 KG/M2 | HEIGHT: 72 IN

## 2023-08-08 DIAGNOSIS — I48.3 TYPICAL ATRIAL FLUTTER (HCC): ICD-10-CM

## 2023-08-08 DIAGNOSIS — I97.89 POSTOPERATIVE ATRIAL FIBRILLATION (HCC): Chronic | ICD-10-CM

## 2023-08-08 DIAGNOSIS — Z95.818 STATUS POST PLACEMENT OF IMPLANTABLE LOOP RECORDER: ICD-10-CM

## 2023-08-08 DIAGNOSIS — I25.10 CORONARY ARTERY DISEASE DUE TO LIPID RICH PLAQUE: Chronic | ICD-10-CM

## 2023-08-08 DIAGNOSIS — Z95.818 PRESENCE OF WATCHMAN LEFT ATRIAL APPENDAGE CLOSURE DEVICE: ICD-10-CM

## 2023-08-08 DIAGNOSIS — I25.83 CORONARY ARTERY DISEASE DUE TO LIPID RICH PLAQUE: Chronic | ICD-10-CM

## 2023-08-08 DIAGNOSIS — I48.91 POSTOPERATIVE ATRIAL FIBRILLATION (HCC): Chronic | ICD-10-CM

## 2023-08-08 DIAGNOSIS — Z98.890 H/O CARDIAC RADIOFREQUENCY ABLATION: ICD-10-CM

## 2023-08-08 PROCEDURE — 93005 ELECTROCARDIOGRAM TRACING: CPT | Performed by: NURSE PRACTITIONER

## 2023-08-08 PROCEDURE — 99211 OFF/OP EST MAY X REQ PHY/QHP: CPT | Performed by: NURSE PRACTITIONER

## 2023-08-08 PROCEDURE — 93010 ELECTROCARDIOGRAM REPORT: CPT | Performed by: NURSE PRACTITIONER

## 2023-08-08 PROCEDURE — 3074F SYST BP LT 130 MM HG: CPT | Performed by: NURSE PRACTITIONER

## 2023-08-08 PROCEDURE — 99214 OFFICE O/P EST MOD 30 MIN: CPT | Performed by: NURSE PRACTITIONER

## 2023-08-08 PROCEDURE — 3078F DIAST BP <80 MM HG: CPT | Performed by: NURSE PRACTITIONER

## 2023-08-08 ASSESSMENT — ENCOUNTER SYMPTOMS
LOSS OF CONSCIOUSNESS: 0
BLURRED VISION: 0
HEADACHES: 0
SPEECH CHANGE: 0
HEARTBURN: 0
ORTHOPNEA: 0
WHEEZING: 0
NAUSEA: 0
COUGH: 0
TREMORS: 0
BLOOD IN STOOL: 0
SENSORY CHANGE: 0
FOCAL WEAKNESS: 0
TINGLING: 0
WEIGHT LOSS: 0
VOMITING: 0
CHILLS: 0
DIZZINESS: 0
SPUTUM PRODUCTION: 0
HEMOPTYSIS: 0
PALPITATIONS: 0
SHORTNESS OF BREATH: 0
DOUBLE VISION: 0
PND: 0
FEVER: 0

## 2023-08-08 ASSESSMENT — FIBROSIS 4 INDEX: FIB4 SCORE: 1.61

## 2023-08-12 ENCOUNTER — NON-PROVIDER VISIT (OUTPATIENT)
Dept: CARDIOLOGY | Facility: MEDICAL CENTER | Age: 73
End: 2023-08-12
Payer: COMMERCIAL

## 2023-08-12 PROCEDURE — 93298 REM INTERROG DEV EVAL SCRMS: CPT | Performed by: INTERNAL MEDICINE

## 2023-08-14 NOTE — CARDIAC REMOTE MONITOR - SCAN
Device transmission reviewed. Device demonstrated appropriate function.       Electronically Signed by: Eli Tang M.D.    8/19/2023  7:53 PM

## 2023-08-31 LAB — EKG IMPRESSION: NORMAL

## 2023-09-05 ENCOUNTER — OFFICE VISIT (OUTPATIENT)
Dept: CARDIOLOGY | Facility: MEDICAL CENTER | Age: 73
End: 2023-09-05
Attending: INTERNAL MEDICINE
Payer: COMMERCIAL

## 2023-09-05 VITALS
SYSTOLIC BLOOD PRESSURE: 124 MMHG | OXYGEN SATURATION: 95 % | RESPIRATION RATE: 14 BRPM | DIASTOLIC BLOOD PRESSURE: 66 MMHG | HEART RATE: 60 BPM | HEIGHT: 72 IN | BODY MASS INDEX: 23.3 KG/M2 | WEIGHT: 172 LBS

## 2023-09-05 DIAGNOSIS — Z45.09 ENCOUNTER FOR LOOP RECORDER CHECK: ICD-10-CM

## 2023-09-05 DIAGNOSIS — I48.3 TYPICAL ATRIAL FLUTTER (HCC): Chronic | ICD-10-CM

## 2023-09-05 PROCEDURE — 93291 INTERROG DEV EVAL SCRMS IP: CPT | Performed by: INTERNAL MEDICINE

## 2023-09-05 PROCEDURE — 99212 OFFICE O/P EST SF 10 MIN: CPT | Performed by: INTERNAL MEDICINE

## 2023-09-05 PROCEDURE — 99214 OFFICE O/P EST MOD 30 MIN: CPT | Mod: 25 | Performed by: INTERNAL MEDICINE

## 2023-09-05 PROCEDURE — 3074F SYST BP LT 130 MM HG: CPT | Performed by: INTERNAL MEDICINE

## 2023-09-05 PROCEDURE — 99211 OFF/OP EST MAY X REQ PHY/QHP: CPT | Performed by: INTERNAL MEDICINE

## 2023-09-05 PROCEDURE — 3078F DIAST BP <80 MM HG: CPT | Performed by: INTERNAL MEDICINE

## 2023-09-05 PROCEDURE — 93005 ELECTROCARDIOGRAM TRACING: CPT | Performed by: INTERNAL MEDICINE

## 2023-09-05 ASSESSMENT — FIBROSIS 4 INDEX: FIB4 SCORE: 1.61

## 2023-09-05 NOTE — PROGRESS NOTES
Arrhythmia Clinic Note (Established patient)    DOS: 9/5/2023    Chief complaint/Reason for consult: Afib    Interval History: 74 y/o M with pAF and PRESTON-C in situ, ILR in situ. Referred back to me to discuss Afib management. He has no new complaints.     ROS (+ highlighted in bold):  Constitutional: Fevers/chills/fatigue/weightloss  HEENT: Blurry vision/eye pain/sore throat/hearing loss  Respiratory: Shortness of breath/cough  Cardiovascular: Chest pain/palpitations/edema/orthopnea/syncope  GI: Nausea/vomitting/diarrhea  MSK: Arthralgias/myagias/muscle weakness  Skin: Rash/sores  Neurological: Numbness/tremors/vertigo  Endocrine: Excessive thirst/polyuria/cold intolerance/heat intolerance  Psych: Depression/anxiety    Past Medical History:   Diagnosis Date    Arrhythmia     pt unaware    ASTHMA     uses inhaler once a day    Breath shortness     Bronchitis     Cataract 2022    IOL bilat    Coronary artery disease due to lipid rich plaque - CABG     High cholesterol     History of myocardial infarction     unaware of at the time    Implantable loop recorder present 04/04/2023    Pain 09/16/2022    intermittent in chest with exercise    Pneumonia     Postoperative atrial fibrillation (HCC) 2013 in setting of CABG     S/P coronary artery bypass graft x 5     Sleep apnea     no cpap    Snoring     Typical atrial flutter (HCC) sen 9/16/2022        Past Surgical History:   Procedure Laterality Date    CATARACT EXTRACTION WITH IOL Bilateral 2022    FISTULA IN ANO REPAIR  2018    MULTIPLE CORONARY ARTERY BYPASS ENDO VEIN HARVEST  12/07/2013    Performed by Mo Vincent M.D. at SURGERY [facility]    CHOLECYSTECTOMY  1996    OTHER CARDIAC SURGERY  2013    OTHER CARDIAC SURGERY      Loop Recorder       Social History     Socioeconomic History    Marital status:      Spouse name: Kaylee    Number of children: Not on file    Years of education: Not on file    Highest education level: Bachelor's degree (e.g., BA,  AB, BS)   Occupational History     Employer: [employer]   Tobacco Use    Smoking status: Some Days     Current packs/day: 0.50     Average packs/day: 0.5 packs/day for 55.0 years (27.5 ttl pk-yrs)     Types: Cigarettes     Start date: 9/1/1968     Passive exposure: Past    Smokeless tobacco: Never    Tobacco comments:     I've quit many times over the years but I always start again   Vaping Use    Vaping Use: Never used   Substance and Sexual Activity    Alcohol use: Yes     Alcohol/week: 2.4 oz     Types: 4 Cans of beer per week     Comment: I usually will only drink once a week and it’s usually 3 or 4    Drug use: No    Sexual activity: Not on file   Other Topics Concern    Not on file   Social History Narrative    Not on file     Social Determinants of Health     Financial Resource Strain: Low Risk  (4/19/2023)    Overall Financial Resource Strain (CARDIA)     Difficulty of Paying Living Expenses: Not hard at all   Food Insecurity: No Food Insecurity (4/19/2023)    Hunger Vital Sign     Worried About Running Out of Food in the Last Year: Never true     Ran Out of Food in the Last Year: Never true   Transportation Needs: No Transportation Needs (4/19/2023)    PRAPARE - Transportation     Lack of Transportation (Medical): No     Lack of Transportation (Non-Medical): No   Physical Activity: Sufficiently Active (4/19/2023)    Exercise Vital Sign     Days of Exercise per Week: 6 days     Minutes of Exercise per Session: 60 min   Stress: Stress Concern Present (4/19/2023)    [employer] [place] [employer] - Occupational Stress Questionnaire     Feeling of Stress : To some extent   Social Connections: Unknown (4/19/2023)    Social Connection and Isolation Panel [NHANES]     Frequency of Communication with Friends and Family: Twice a week     Frequency of Social Gatherings with Friends and Family: Patient refused     Attends Evangelical Services: 1 to 4 times per year     Active Member of Clubs or Organizations: No      Attends Club or Organization Meetings: Never     Marital Status:    Intimate Partner Violence: Not on file   Housing Stability: Low Risk  (4/19/2023)    Housing Stability Vital Sign     Unable to Pay for Housing in the Last Year: No     Number of Places Lived in the Last Year: 1     Unstable Housing in the Last Year: No       Family History   Problem Relation Age of Onset    Diabetes Mother     Stroke Mother     Cancer Father         None    Heart Disease Neg Hx        Allergies   Allergen Reactions    Cat Hair Extract Runny Nose and Itching     .    Xarelto [Rivaroxaban]      nausea       Current Outpatient Medications   Medication Sig Dispense Refill    aspirin (ASA) 81 MG Chew Tab chewable tablet Chew 1 Tablet every day. 100 Tablet 3    rosuvastatin (CRESTOR) 10 MG Tab Take 1 Tablet by mouth every day. 90 Tablet 3    HYDROcodone/acetaminophen (NORCO)  MG Tab Take 1 Tablet by mouth every 6 hours as needed for Severe Pain.       No current facility-administered medications for this visit.       Physical Exam:  Vitals:    09/05/23 0748   BP: 124/66   BP Location: Left arm   Patient Position: Sitting   BP Cuff Size: Adult   Pulse: 60   Resp: 14   SpO2: 95%   Weight: 78 kg (172 lb)   Height: 1.829 m (6')     General appearance: NAD, conversant   Eyes: anicteric sclerae, moist conjunctivae; no lid-lag; PERRLA  HENT: Atraumatic; oropharynx clear with moist mucous membranes and no mucosal ulcerations; normal hard and soft palate  Neck: Trachea midline; FROM, supple, no thyromegaly or lymphadenopathy  Lungs: CTA, with normal respiratory effort and no intercostal retractions  CV: RRR, no MRGs, no JVD  Abdomen: Soft, non-tender; no masses or HSM  Extremities: No peripheral edema or extremity lymphadenopathy  Skin: Normal temperature, turgor and texture; no rash, ulcers or subcutaneous nodules  Psych: Appropriate affect, alert and oriented to person, place and time    Data:  Lipids:   Lab Results   Component  "Value Date/Time    CHOLSTRLTOT 148 08/29/2022 06:47 AM    TRIGLYCERIDE 81 08/29/2022 06:47 AM    HDL 52 08/29/2022 06:47 AM    LDL 80 08/29/2022 06:47 AM        BMP:  Lab Results   Component Value Date/Time    SODIUM 138 04/17/2023 0725    POTASSIUM 4.6 04/17/2023 0725    CHLORIDE 104 04/17/2023 0725    CO2 25 04/17/2023 0725    GLUCOSE 126 (H) 04/17/2023 0725    BUN 17 04/17/2023 0725    CREATININE 1.20 04/17/2023 0725    CALCIUM 9.7 04/17/2023 0725    ANION 9.0 04/17/2023 0725        TSH:   Lab Results   Component Value Date/Time    TSHULTRASEN 1.050 11/10/2020 1439        THYROXINE (T4):   No results found for: \"FREEDIR\"     CBC:   Lab Results   Component Value Date/Time    WBC 12.4 (H) 04/17/2023 07:25 AM    RBC 5.11 04/17/2023 07:25 AM    HEMOGLOBIN 16.6 04/17/2023 07:25 AM    HEMATOCRIT 48.2 04/17/2023 07:25 AM    MCV 94.3 04/17/2023 07:25 AM    MCH 32.5 04/17/2023 07:25 AM    MCHC 34.4 04/17/2023 07:25 AM    RDW 50.1 (H) 04/17/2023 07:25 AM    PLATELETCT 260 04/17/2023 07:25 AM    MPV 9.3 04/17/2023 07:25 AM    NEUTSPOLYS 63.40 04/17/2023 07:25 AM    LYMPHOCYTES 24.00 04/17/2023 07:25 AM    MONOCYTES 8.70 04/17/2023 07:25 AM    EOSINOPHILS 2.60 04/17/2023 07:25 AM    BASOPHILS 0.70 04/17/2023 07:25 AM    IMMGRAN 0.60 04/17/2023 07:25 AM    NRBC 0.00 04/17/2023 07:25 AM    NEUTS 7.89 (H) 04/17/2023 07:25 AM    LYMPHS 2.98 04/17/2023 07:25 AM    MONOS 1.08 (H) 04/17/2023 07:25 AM    EOS 0.32 04/17/2023 07:25 AM    BASO 0.09 04/17/2023 07:25 AM    IMMGRANAB 0.08 04/17/2023 07:25 AM    NRBCAB 0.00 04/17/2023 07:25 AM        CBC w/o DIFF  Lab Results   Component Value Date/Time    WBC 12.4 (H) 04/17/2023 07:25 AM    RBC 5.11 04/17/2023 07:25 AM    HEMOGLOBIN 16.6 04/17/2023 07:25 AM    MCV 94.3 04/17/2023 07:25 AM    MCH 32.5 04/17/2023 07:25 AM    MCHC 34.4 04/17/2023 07:25 AM    RDW 50.1 (H) 04/17/2023 07:25 AM    MPV 9.3 04/17/2023 07:25 AM       Prior echo/stress reviewed: No flow through PRESTON-C    Prior cath " reviewed: Stable CAD    EKG interpreted by me: NSR    ILR interrogation: 2.2% burden AF    Impression/Plan:  1. Typical atrial flutter (HCC) seen 9/16/2022  EKG        AFL s/p ablation  pAF  ILR in situ  PRESTON occlusion    - We discussed afib management  - At this time pAF episodes are not too symptomatic or tachycardic  - We will monitor burden through ILR and if increasing or early persistent discuss possible ablation given younger age   - Continue ASA    Annual FV    Catrachito Ferrell MD  Cardiac Electrophysiology

## 2023-09-12 ENCOUNTER — NON-PROVIDER VISIT (OUTPATIENT)
Dept: CARDIOLOGY | Facility: MEDICAL CENTER | Age: 73
End: 2023-09-12
Payer: COMMERCIAL

## 2023-09-12 PROCEDURE — 93298 REM INTERROG DEV EVAL SCRMS: CPT | Performed by: INTERNAL MEDICINE

## 2023-09-12 NOTE — CARDIAC REMOTE MONITOR - SCAN
Device transmission reviewed. Device demonstrated appropriate function.       Electronically Signed by: Ivan Kelly M.D.    9/12/2023  1:47 PM

## 2023-09-14 LAB — EKG IMPRESSION: NORMAL

## 2023-09-14 PROCEDURE — 93010 ELECTROCARDIOGRAM REPORT: CPT | Performed by: INTERNAL MEDICINE

## 2023-10-09 ENCOUNTER — PATIENT MESSAGE (OUTPATIENT)
Dept: CARDIOLOGY | Facility: MEDICAL CENTER | Age: 73
End: 2023-10-09
Payer: COMMERCIAL

## 2023-10-09 ENCOUNTER — HOSPITAL ENCOUNTER (OUTPATIENT)
Dept: LAB | Facility: MEDICAL CENTER | Age: 73
End: 2023-10-09
Attending: INTERNAL MEDICINE
Payer: COMMERCIAL

## 2023-10-09 DIAGNOSIS — Z95.1 S/P CORONARY ARTERY BYPASS GRAFT X 5: ICD-10-CM

## 2023-10-09 DIAGNOSIS — E78.5 DYSLIPIDEMIA: ICD-10-CM

## 2023-10-09 LAB
CHOLEST SERPL-MCNC: 146 MG/DL (ref 100–199)
HDLC SERPL-MCNC: 56 MG/DL
LDLC SERPL CALC-MCNC: 76 MG/DL
TRIGL SERPL-MCNC: 69 MG/DL (ref 0–149)

## 2023-10-09 PROCEDURE — 36415 COLL VENOUS BLD VENIPUNCTURE: CPT

## 2023-10-09 PROCEDURE — 80061 LIPID PANEL: CPT

## 2023-10-09 NOTE — TELEPHONE ENCOUNTER
CW- Please advise, patient responding back to you in regards to Lipid panel. You mentioned increase the rosuvastatin to 20 mg, ideally 40 mg or add ezetimibe 10 mg for proven additional benefit.   Patient is hesitant/concerned with increasing due to side effects.

## 2023-10-10 RX ORDER — EZETIMIBE 10 MG/1
10 TABLET ORAL DAILY
Qty: 90 TABLET | Refills: 3 | Status: SHIPPED | OUTPATIENT
Start: 2023-10-10

## 2023-10-13 ENCOUNTER — NON-PROVIDER VISIT (OUTPATIENT)
Dept: CARDIOLOGY | Facility: MEDICAL CENTER | Age: 73
End: 2023-10-13
Payer: COMMERCIAL

## 2023-10-13 PROCEDURE — 93298 REM INTERROG DEV EVAL SCRMS: CPT | Performed by: INTERNAL MEDICINE

## 2023-10-13 NOTE — CARDIAC REMOTE MONITOR - SCAN
Device transmission reviewed. Device demonstrated appropriate function.       Electronically Signed by: Eli Tang M.D.    10/31/2023  11:05 AM

## 2023-11-13 ENCOUNTER — NON-PROVIDER VISIT (OUTPATIENT)
Dept: CARDIOLOGY | Facility: MEDICAL CENTER | Age: 73
End: 2023-11-13
Payer: COMMERCIAL

## 2023-11-13 PROCEDURE — 93298 REM INTERROG DEV EVAL SCRMS: CPT | Performed by: INTERNAL MEDICINE

## 2023-11-14 NOTE — CARDIAC REMOTE MONITOR - SCAN
Device transmission reviewed. Device demonstrated appropriate function.       Electronically Signed by: Catrachito Ferrell M.D.    11/18/2023  2:28 PM

## 2023-12-04 ENCOUNTER — TELEPHONE (OUTPATIENT)
Dept: CARDIOLOGY | Facility: MEDICAL CENTER | Age: 73
End: 2023-12-04
Payer: COMMERCIAL

## 2023-12-04 NOTE — TELEPHONE ENCOUNTER
FYI - remote transmission received via home monitor, AT/AF burden increased to 13% compared to 2.2% on 9/5/2023, longest episode information below, full report scanned into Media.    AF/AFL Episode  -Episode Onset: 12/1/2023 @ 11:45 AM  -Duration: 1 hour 12 minutes  -Patient on OAC?: No, watchman placed 4/19/2023

## 2023-12-04 NOTE — TELEPHONE ENCOUNTER
Catrachito Ferrell M.D.  You; Lisa Murray, Med Ass't11 minutes ago (3:28 PM)       Can bring in for GEOVANNY [facility] to discuss

## 2023-12-05 ENCOUNTER — OFFICE VISIT (OUTPATIENT)
Dept: CARDIOLOGY | Facility: MEDICAL CENTER | Age: 73
End: 2023-12-05
Attending: PHYSICIAN ASSISTANT
Payer: COMMERCIAL

## 2023-12-05 ENCOUNTER — TELEPHONE (OUTPATIENT)
Dept: CARDIOLOGY | Facility: MEDICAL CENTER | Age: 73
End: 2023-12-05

## 2023-12-05 VITALS
RESPIRATION RATE: 14 BRPM | HEART RATE: 68 BPM | BODY MASS INDEX: 23.43 KG/M2 | WEIGHT: 173 LBS | DIASTOLIC BLOOD PRESSURE: 68 MMHG | OXYGEN SATURATION: 95 % | SYSTOLIC BLOOD PRESSURE: 124 MMHG | HEIGHT: 72 IN

## 2023-12-05 DIAGNOSIS — I48.3 TYPICAL ATRIAL FLUTTER (HCC): ICD-10-CM

## 2023-12-05 DIAGNOSIS — Z95.818 PRESENCE OF WATCHMAN LEFT ATRIAL APPENDAGE CLOSURE DEVICE: ICD-10-CM

## 2023-12-05 DIAGNOSIS — Z98.890 H/O CARDIAC RADIOFREQUENCY ABLATION: ICD-10-CM

## 2023-12-05 DIAGNOSIS — I48.0 PAF (PAROXYSMAL ATRIAL FIBRILLATION) (HCC): ICD-10-CM

## 2023-12-05 PROCEDURE — 99212 OFFICE O/P EST SF 10 MIN: CPT | Performed by: NURSE PRACTITIONER

## 2023-12-05 PROCEDURE — 3078F DIAST BP <80 MM HG: CPT | Performed by: NURSE PRACTITIONER

## 2023-12-05 PROCEDURE — 93005 ELECTROCARDIOGRAM TRACING: CPT | Performed by: NURSE PRACTITIONER

## 2023-12-05 PROCEDURE — 99214 OFFICE O/P EST MOD 30 MIN: CPT | Mod: 25 | Performed by: NURSE PRACTITIONER

## 2023-12-05 PROCEDURE — 99211 OFF/OP EST MAY X REQ PHY/QHP: CPT | Performed by: NURSE PRACTITIONER

## 2023-12-05 PROCEDURE — 3074F SYST BP LT 130 MM HG: CPT | Performed by: NURSE PRACTITIONER

## 2023-12-05 ASSESSMENT — ENCOUNTER SYMPTOMS
CLAUDICATION: 0
PND: 0
WEIGHT LOSS: 0
ORTHOPNEA: 0
PALPITATIONS: 0
WEAKNESS: 0
SHORTNESS OF BREATH: 0
DIZZINESS: 0

## 2023-12-05 ASSESSMENT — FIBROSIS 4 INDEX: FIB4 SCORE: 1.61

## 2023-12-05 NOTE — PROGRESS NOTES
Chief Complaint   Patient presents with    Atrial Flutter     F/v Dx:Typical atrial flutter (HCC)       Subjective     Sebastian Gomez is a 73 y.o. male patient of Dr. Ferrell who presents today due to ILR showing AT/AF burden increasing to 13% from 2.2% in September. Episode occurred on 12/1/2023 lasting 1 hour and 12 minutes.     Patient is also followed by Dr. Sukhjinder Stephenson.    Patient was last seen by Dr. Ferrell on 9/5/2023. He was overall doing well. No changes were made to his cardiovascular regimen.     Past medical history pertinent for AFL H/O ablation, pAF H/O PRESTON-C, ILR In Situ, ZHANE, HTN, CAD H/O CABG in 2013, HLD and asthma.     Today patient states that he is not feeling well overall. He is not sure if it is from his increasing AF burden or the Zetia he just recently started. Denies having any specific complaints such as chest pain, SOB, palpitations, edema or dizziness.     Past Medical History:   Diagnosis Date    Arrhythmia     pt unaware    ASTHMA     uses inhaler once a day    Breath shortness     Bronchitis     Cataract 2022    IOL bilat    Coronary artery disease due to lipid rich plaque - CABG     High cholesterol     History of myocardial infarction     unaware of at the time    Implantable loop recorder present 04/04/2023    Pain 09/16/2022    intermittent in chest with exercise    Pneumonia     Postoperative atrial fibrillation (HCC) 2013 in setting of CABG     S/P coronary artery bypass graft x 5     Sleep apnea     no cpap    Snoring     Typical atrial flutter (HCC) sen 9/16/2022      Past Surgical History:   Procedure Laterality Date    CATARACT EXTRACTION WITH IOL Bilateral 2022    FISTULA IN ANO REPAIR  2018    MULTIPLE CORONARY ARTERY BYPASS ENDO VEIN HARVEST  12/07/2013    Performed by Mo Vincent M.D. at SURGERY [facility] ORS    CHOLECYSTECTOMY  1996    OTHER CARDIAC SURGERY  2013    OTHER CARDIAC SURGERY      Loop Recorder     Family History   Problem Relation Age of  Onset    Diabetes Mother     Stroke Mother     Cancer Father         None    Heart Disease Neg Hx      Social History     Socioeconomic History    Marital status:      Spouse name: Kaylee    Number of children: Not on file    Years of education: Not on file    Highest education level: Bachelor's degree (e.g., BA, AB, BS)   Occupational History     Employer: [employer]   Tobacco Use    Smoking status: Some Days     Current packs/day: 0.50     Average packs/day: 0.5 packs/day for 55.3 years (27.6 ttl pk-yrs)     Types: Cigarettes     Start date: 9/1/1968     Passive exposure: Past    Smokeless tobacco: Never    Tobacco comments:     I've quit many times over the years but I always start again   Vaping Use    Vaping Use: Never used   Substance and Sexual Activity    Alcohol use: Yes     Alcohol/week: 2.4 oz     Types: 4 Cans of beer per week     Comment: I usually will only drink once a week and it’s usually 3 or 4    Drug use: No    Sexual activity: Not on file   Other Topics Concern    Not on file   Social History Narrative    Not on file     Social Determinants of Health     Financial Resource Strain: Low Risk  (4/19/2023)    Overall Financial Resource Strain (CARDIA)     Difficulty of Paying Living Expenses: Not hard at all   Food Insecurity: No Food Insecurity (4/19/2023)    Hunger Vital Sign     Worried About Running Out of Food in the Last Year: Never true     Ran Out of Food in the Last Year: Never true   Transportation Needs: No Transportation Needs (4/19/2023)    PRAPARE - Transportation     Lack of Transportation (Medical): No     Lack of Transportation (Non-Medical): No   Physical Activity: Sufficiently Active (4/19/2023)    Exercise Vital Sign     Days of Exercise per Week: 6 days     Minutes of Exercise per Session: 60 min   Stress: Stress Concern Present (4/19/2023)    [employer] - Occupational Stress Questionnaire     Feeling of Stress : To some extent   Social  Connections: Unknown (4/19/2023)    Social Connection and Isolation Panel [NHANES]     Frequency of Communication with Friends and Family: Twice a week     Frequency of Social Gatherings with Friends and Family: Patient refused     Attends Quaker Services: 1 to 4 times per year     Active Member of Clubs or Organizations: No     Attends Club or Organization Meetings: Never     Marital Status:    Intimate Partner Violence: Not on file   Housing Stability: Low Risk  (4/19/2023)    Housing Stability Vital Sign     Unable to Pay for Housing in the Last Year: No     Number of Places Lived in the Last Year: 1     Unstable Housing in the Last Year: No     Allergies   Allergen Reactions    Cat Hair Extract Runny Nose and Itching     .    Xarelto [Rivaroxaban]      nausea     Outpatient Encounter Medications as of 12/5/2023   Medication Sig Dispense Refill    ezetimibe (ZETIA) 10 MG Tab Take 1 Tablet by mouth every day. 90 Tablet 3    aspirin (ASA) 81 MG Chew Tab chewable tablet Chew 1 Tablet every day. 100 Tablet 3    rosuvastatin (CRESTOR) 10 MG Tab Take 1 Tablet by mouth every day. 90 Tablet 3    HYDROcodone/acetaminophen (NORCO)  MG Tab Take 1 Tablet by mouth every 6 hours as needed for Severe Pain.       No facility-administered encounter medications on file as of 12/5/2023.     Review of Systems   Constitutional:  Positive for malaise/fatigue. Negative for weight loss.   Respiratory:  Negative for shortness of breath.    Cardiovascular:  Negative for chest pain, palpitations, orthopnea, claudication, leg swelling and PND.   Neurological:  Negative for dizziness and weakness.   All other systems reviewed and are negative.             Objective     /68 (BP Location: Left arm, Patient Position: Sitting, BP Cuff Size: Adult)   Pulse 68   Resp 14   Ht 1.829 m (6')   Wt 78.5 kg (173 lb)   SpO2 95%   BMI 23.46 kg/m²     Physical Exam  Constitutional:       General: He is not in acute distress.      Appearance: He is well-developed.   HENT:      Head: Normocephalic.   Eyes:      Extraocular Movements: Extraocular movements intact.   Neck:      Vascular: No carotid bruit or JVD.   Cardiovascular:      Rate and Rhythm: Normal rate. Rhythm irregular.      Heart sounds: Normal heart sounds. No murmur heard.  Pulmonary:      Effort: Pulmonary effort is normal.      Breath sounds: Normal breath sounds.   Musculoskeletal:      Cervical back: Normal range of motion.      Right lower leg: No edema.      Left lower leg: No edema.   Skin:     General: Skin is warm and dry.   Neurological:      Mental Status: He is alert and oriented to person, place, and time.   Psychiatric:         Behavior: Behavior normal.                Assessment & Plan     1. Typical atrial flutter (HCC)  EKG      2. PAF (paroxysmal atrial fibrillation) (HCC)  CL-EP ABLATION ATRIAL FIBRILLATION    EC-EDWAR W/O CONT      3. H/O cardiac radiofrequency ablation. 12/7/22. Dr Ferrell         4. Presence of Watchman left atrial appendage closure device            Medical Decision Making: Today's Assessment/Status/Plan:     Increasing AF/AFL burden per ILR interrogation. Patient is currently in AFL, rate controlled. Dr. Ferrell recommends proceeding with ablation. The risks and benefits of ablation was discussed as listed below, patient verbalized understanding and wishes to proceed. H/O PRESTON-C, patient is aware that he will need to be on OAC for 2 months post ablation.     Risks include vascular access bleeding or pain, infection, stroke, myocardial infarction, cardiac tamponade, pericardial effusion, myocardial perforation, major bleeding, phrenic nerve damage, heart block requiring a pacemaker, and death. Risk of major adverse event is ~1%. Success rates long term are 80-95% depending on the arrhythmia induced and the acute success in the EP lab.

## 2023-12-05 NOTE — TELEPHONE ENCOUNTER
----- Message from MITZI Jerome sent at 12/5/2023 12:08 PM PST -----  Regarding: AF Ablation  [facility]    Would you please schedule patient for AF ablation per ? No meds to hold.    Thanks,    TAMERA

## 2023-12-05 NOTE — TELEPHONE ENCOUNTER
Patient scheduled for afib ablation w/EDWAR on 2-12-24 with Dr. Ferrell. Patient has been instructed to check in at 6:00 for 7:30 case time. Message sent to authorizations. Emailed Carto

## 2023-12-07 ENCOUNTER — HOSPITAL ENCOUNTER (EMERGENCY)
Facility: MEDICAL CENTER | Age: 73
End: 2023-12-07
Attending: EMERGENCY MEDICINE
Payer: COMMERCIAL

## 2023-12-07 ENCOUNTER — APPOINTMENT (OUTPATIENT)
Dept: RADIOLOGY | Facility: MEDICAL CENTER | Age: 73
End: 2023-12-07
Attending: EMERGENCY MEDICINE
Payer: COMMERCIAL

## 2023-12-07 VITALS
RESPIRATION RATE: 16 BRPM | SYSTOLIC BLOOD PRESSURE: 95 MMHG | HEIGHT: 73 IN | OXYGEN SATURATION: 95 % | DIASTOLIC BLOOD PRESSURE: 60 MMHG | TEMPERATURE: 98.3 F | HEART RATE: 59 BPM | BODY MASS INDEX: 22.82 KG/M2 | WEIGHT: 172.18 LBS

## 2023-12-07 DIAGNOSIS — K61.0 PERIANAL ABSCESS: ICD-10-CM

## 2023-12-07 DIAGNOSIS — I72.3 ILIAC ARTERY ANEURYSM, BILATERAL (HCC): ICD-10-CM

## 2023-12-07 DIAGNOSIS — K60.3 PERIANAL FISTULA: ICD-10-CM

## 2023-12-07 LAB
ALBUMIN SERPL BCP-MCNC: 3.8 G/DL (ref 3.2–4.9)
ALBUMIN/GLOB SERPL: 1 G/DL
ALP SERPL-CCNC: 106 U/L (ref 30–99)
ALT SERPL-CCNC: 17 U/L (ref 2–50)
ANION GAP SERPL CALC-SCNC: 9 MMOL/L (ref 7–16)
AST SERPL-CCNC: 22 U/L (ref 12–45)
BASOPHILS # BLD AUTO: 0.6 % (ref 0–1.8)
BASOPHILS # BLD: 0.07 K/UL (ref 0–0.12)
BILIRUB SERPL-MCNC: 0.7 MG/DL (ref 0.1–1.5)
BUN SERPL-MCNC: 21 MG/DL (ref 8–22)
CALCIUM ALBUM COR SERPL-MCNC: 9.8 MG/DL (ref 8.5–10.5)
CALCIUM SERPL-MCNC: 9.6 MG/DL (ref 8.4–10.2)
CHLORIDE SERPL-SCNC: 102 MMOL/L (ref 96–112)
CO2 SERPL-SCNC: 26 MMOL/L (ref 20–33)
CREAT SERPL-MCNC: 1.02 MG/DL (ref 0.5–1.4)
EOSINOPHIL # BLD AUTO: 0.35 K/UL (ref 0–0.51)
EOSINOPHIL NFR BLD: 2.8 % (ref 0–6.9)
ERYTHROCYTE [DISTWIDTH] IN BLOOD BY AUTOMATED COUNT: 47.8 FL (ref 35.9–50)
GFR SERPLBLD CREATININE-BSD FMLA CKD-EPI: 78 ML/MIN/1.73 M 2
GLOBULIN SER CALC-MCNC: 4 G/DL (ref 1.9–3.5)
GLUCOSE SERPL-MCNC: 87 MG/DL (ref 65–99)
HCT VFR BLD AUTO: 45.9 % (ref 42–52)
HGB BLD-MCNC: 15.5 G/DL (ref 14–18)
IMM GRANULOCYTES # BLD AUTO: 0.06 K/UL (ref 0–0.11)
IMM GRANULOCYTES NFR BLD AUTO: 0.5 % (ref 0–0.9)
LYMPHOCYTES # BLD AUTO: 3.05 K/UL (ref 1–4.8)
LYMPHOCYTES NFR BLD: 24.4 % (ref 22–41)
MCH RBC QN AUTO: 32.2 PG (ref 27–33)
MCHC RBC AUTO-ENTMCNC: 33.8 G/DL (ref 32.3–36.5)
MCV RBC AUTO: 95.2 FL (ref 81.4–97.8)
MONOCYTES # BLD AUTO: 1.48 K/UL (ref 0–0.85)
MONOCYTES NFR BLD AUTO: 11.8 % (ref 0–13.4)
NEUTROPHILS # BLD AUTO: 7.5 K/UL (ref 1.82–7.42)
NEUTROPHILS NFR BLD: 59.9 % (ref 44–72)
NRBC # BLD AUTO: 0 K/UL
NRBC BLD-RTO: 0 /100 WBC (ref 0–0.2)
PLATELET # BLD AUTO: 248 K/UL (ref 164–446)
PMV BLD AUTO: 9.1 FL (ref 9–12.9)
POTASSIUM SERPL-SCNC: 4.2 MMOL/L (ref 3.6–5.5)
PROT SERPL-MCNC: 7.8 G/DL (ref 6–8.2)
RBC # BLD AUTO: 4.82 M/UL (ref 4.7–6.1)
SODIUM SERPL-SCNC: 137 MMOL/L (ref 135–145)
WBC # BLD AUTO: 12.5 K/UL (ref 4.8–10.8)

## 2023-12-07 PROCEDURE — 85025 COMPLETE CBC W/AUTO DIFF WBC: CPT

## 2023-12-07 PROCEDURE — 74177 CT ABD & PELVIS W/CONTRAST: CPT

## 2023-12-07 PROCEDURE — 96365 THER/PROPH/DIAG IV INF INIT: CPT

## 2023-12-07 PROCEDURE — 80053 COMPREHEN METABOLIC PANEL: CPT

## 2023-12-07 PROCEDURE — 700117 HCHG RX CONTRAST REV CODE 255: Performed by: EMERGENCY MEDICINE

## 2023-12-07 PROCEDURE — 700105 HCHG RX REV CODE 258: Performed by: EMERGENCY MEDICINE

## 2023-12-07 PROCEDURE — 36415 COLL VENOUS BLD VENIPUNCTURE: CPT

## 2023-12-07 PROCEDURE — 700111 HCHG RX REV CODE 636 W/ 250 OVERRIDE (IP): Mod: JZ | Performed by: EMERGENCY MEDICINE

## 2023-12-07 PROCEDURE — 99284 EMERGENCY DEPT VISIT MOD MDM: CPT

## 2023-12-07 RX ORDER — AMOXICILLIN AND CLAVULANATE POTASSIUM 875; 125 MG/1; MG/1
1 TABLET, FILM COATED ORAL 2 TIMES DAILY
Qty: 14 TABLET | Refills: 0 | Status: ACTIVE | OUTPATIENT
Start: 2023-12-07 | End: 2023-12-14

## 2023-12-07 RX ADMIN — AMPICILLIN AND SULBACTAM 3 G: 1; 2 INJECTION, POWDER, FOR SOLUTION INTRAMUSCULAR; INTRAVENOUS at 15:14

## 2023-12-07 RX ADMIN — IOHEXOL 100 ML: 350 INJECTION, SOLUTION INTRAVENOUS at 13:53

## 2023-12-07 ASSESSMENT — FIBROSIS 4 INDEX: FIB4 SCORE: 1.61

## 2023-12-07 NOTE — DISCHARGE INSTRUCTIONS
Follow-up with colorectal surgery, Dr. Mehta has been recommended by the general surgeon on-call today.    Follow-up with vascular surgeon regarding incidental finding on your CAT scan of bilateral common iliac artery aneurysms    Return to the emergency department for fever, severe pain or any concerns.

## 2023-12-07 NOTE — ED PROVIDER NOTES
ED Provider Note    CHIEF COMPLAINT  Chief Complaint   Patient presents with    Wound Infection     RT buttock pain and swelling x 3 d  Perirectal abscess opened yesterday Pt has purulent drainage  Denies fever  Seen by PMD today and referred to ED Hx rectal fistula repair 03/2018         HPI/ROS  LIMITATION TO HISTORY   Select: : None  OUTSIDE HISTORIAN(S):  None    Hugo Gomez is a 73 y.o. male who presents 5 days of right perianal pain, swollen area ruptured this morning draining pus.  Patient had repair of rectal fistula in 2018 by Dr. Young.  He states he has not had a problem since then until Sunday when the pain developed.  No anterior abdominal pain, no vomiting.  He states he had normal bowel movement yesterday.  No fever or chills or dizziness.  Seen by primary care doctor this morning, referred to the Emergency Department for further care.    PAST MEDICAL HISTORY   has a past medical history of Arrhythmia, ASTHMA, Breath shortness, Bronchitis, Cataract (2022), Coronary artery disease due to lipid rich plaque - CABG, High cholesterol, History of myocardial infarction, Implantable loop recorder present (04/04/2023), Pain (09/16/2022), Pneumonia, Postoperative atrial fibrillation (HCC) 2013 in setting of CABG, S/P coronary artery bypass graft x 5, Sleep apnea, Snoring, and Typical atrial flutter (HCC) sen 9/16/2022.    SURGICAL HISTORY   has a past surgical history that includes cholecystectomy (1996); multiple coronary artery bypass endo vein harvest (12/07/2013); cataract extraction with iol (Bilateral, 2022); fistula in ano repair (2018); other cardiac surgery (2013); and other cardiac surgery.    FAMILY HISTORY  Family History   Problem Relation Age of Onset    Diabetes Mother     Stroke Mother     Cancer Father         None    Heart Disease Neg Hx        SOCIAL HISTORY  Social History     Tobacco Use    Smoking status: Some Days     Current packs/day: 0.50     Average packs/day: 0.5  "packs/day for 55.3 years (27.6 ttl pk-yrs)     Types: Cigarettes     Start date: 9/1/1968     Passive exposure: Past    Smokeless tobacco: Never    Tobacco comments:     I've quit many times over the years but I always start again   Vaping Use    Vaping Use: Never used   Substance and Sexual Activity    Alcohol use: Yes     Alcohol/week: 2.4 oz     Types: 4 Cans of beer per week     Comment: I usually will only drink once a week and it’s usually 3 or 4    Drug use: No    Sexual activity: Not on file       CURRENT MEDICATIONS  Home Medications    **Home medications have not yet been reviewed for this encounter**         ALLERGIES  Allergies   Allergen Reactions    Cat Hair Extract Runny Nose and Itching     .    Xarelto [Rivaroxaban]      nausea       PHYSICAL EXAM  VITAL SIGNS: /69   Pulse 75   Temp 36.3 °C (97.4 °F) (Temporal)   Resp 18   Ht 1.854 m (6' 1\")   Wt 78.1 kg (172 lb 2.9 oz)   SpO2 96%   BMI 22.72 kg/m²    Constitutional: Well-nourished, no acute distress  Cardiac: Regular rate and rhythm  GI: Abdomen is soft and nontender, no guarding.  Perianal region shows scarring, purulent discharge from lesion right side of the perianal region.  No palpable fluctuance.  Erythematous region is mildly tender.  No hemorrhage  Skin: Erythematous change and purulent discharge from the right perianal region    DIAGNOSTIC STUDIES / PROCEDURES      LABS  Results for orders placed or performed during the hospital encounter of 12/07/23   CBC WITH DIFFERENTIAL   Result Value Ref Range    WBC 12.5 (H) 4.8 - 10.8 K/uL    RBC 4.82 4.70 - 6.10 M/uL    Hemoglobin 15.5 14.0 - 18.0 g/dL    Hematocrit 45.9 42.0 - 52.0 %    MCV 95.2 81.4 - 97.8 fL    MCH 32.2 27.0 - 33.0 pg    MCHC 33.8 32.3 - 36.5 g/dL    RDW 47.8 35.9 - 50.0 fL    Platelet Count 248 164 - 446 K/uL    MPV 9.1 9.0 - 12.9 fL    Neutrophils-Polys 59.90 44.00 - 72.00 %    Lymphocytes 24.40 22.00 - 41.00 %    Monocytes 11.80 0.00 - 13.40 %    Eosinophils 2.80 " 0.00 - 6.90 %    Basophils 0.60 0.00 - 1.80 %    Immature Granulocytes 0.50 0.00 - 0.90 %    Nucleated RBC 0.00 0.00 - 0.20 /100 WBC    Neutrophils (Absolute) 7.50 (H) 1.82 - 7.42 K/uL    Lymphs (Absolute) 3.05 1.00 - 4.80 K/uL    Monos (Absolute) 1.48 (H) 0.00 - 0.85 K/uL    Eos (Absolute) 0.35 0.00 - 0.51 K/uL    Baso (Absolute) 0.07 0.00 - 0.12 K/uL    Immature Granulocytes (abs) 0.06 0.00 - 0.11 K/uL    NRBC (Absolute) 0.00 K/uL   COMP METABOLIC PANEL   Result Value Ref Range    Sodium 137 135 - 145 mmol/L    Potassium 4.2 3.6 - 5.5 mmol/L    Chloride 102 96 - 112 mmol/L    Co2 26 20 - 33 mmol/L    Anion Gap 9.0 7.0 - 16.0    Glucose 87 65 - 99 mg/dL    Bun 21 8 - 22 mg/dL    Creatinine 1.02 0.50 - 1.40 mg/dL    Calcium 9.6 8.4 - 10.2 mg/dL    Correct Calcium 9.8 8.5 - 10.5 mg/dL    AST(SGOT) 22 12 - 45 U/L    ALT(SGPT) 17 2 - 50 U/L    Alkaline Phosphatase 106 (H) 30 - 99 U/L    Total Bilirubin 0.7 0.1 - 1.5 mg/dL    Albumin 3.8 3.2 - 4.9 g/dL    Total Protein 7.8 6.0 - 8.2 g/dL    Globulin 4.0 (H) 1.9 - 3.5 g/dL    A-G Ratio 1.0 g/dL   ESTIMATED GFR   Result Value Ref Range    GFR (CKD-EPI) 78 >60 mL/min/1.73 m 2         RADIOLOGY    Radiologist interpretation:   CT-ABDOMEN-PELVIS WITH   Final Result      1.  Inflammatory change involving the right perianal region extending into the right buttock inferiorly. There is gas within the subcutaneous fat consistent with recent drainage. There is a small linear tract of fluid extending through that area which    may represent small fistula or residual abscess measuring less than 1 cm in size.      2.  Extensive atherosclerotic vascular calcification with bilateral common iliac artery aneurysms measuring 3.3 x 3 cm in size on the right and 3 x 2.6 cm on the left. There is extension into the internal iliac arteries with occlusion of the right    internal iliac artery and aneurysm of the left common iliac artery measuring 2.6 cm in diameter.      3.  Fatty liver.       4.  Bibasilar linear atelectasis versus scarring.      5.  Prior cholecystectomy.            COURSE & MEDICAL DECISION MAKING    ED Observation Status? No; Patient does not meet criteria for ED Observation.     INITIAL ASSESSMENT, COURSE AND PLAN  Care Narrative: Patient presents with what appears to be ruptured draining perianal abscess.  With history of rectal fistula in the past, CT scan is obtained showing slight residual purulence, area concerning for fistula.  Patient is stable vital signs.  Slight elevation white blood cell count.  Case was discussed with on-call general surgery Dr. Ramos who is recommended outpatient follow-up with colorectal surgery, he recommended Dr. Mehta.  Patient was given referral.  Patient started on IV Unasyn and will continue on Augmentin at the request of consulting surgeon.  Patient stated he did not require pain medicine.  Patient was comfortable with this plan.  He was given return precautions.  Incidental finding on his CT scan showed bilateral iliac artery aneurysms, he has been referred to vascular surgery for follow-up.        ADDITIONAL PROBLEM LIST  Iliac artery aneurysm: Referred to vascular surgery.    .  DISPOSITION AND DISCUSSIONS  I have discussed management of the patient with the following physicians and GEOVANNY's: Case discussed with on-call general surgery Dr. Ramos      Escalation of care considered, and ultimately not performed:acute inpatient care management, however at this time, the patient is most appropriate for outpatient management    Barriers to care at this time, including but not limited to:  None .     Decision tools and prescription drugs considered including, but not limited to: Pain Medications were not required, patient states he will use over-the-counter pain medication if needed .    FINAL DIAGNOSIS  1. Perianal abscess    2. Perianal fistula    3. Iliac artery aneurysm, bilateral (HCC)           Electronically signed by: Gallo Rai,  M.D., 12/7/2023 12:42 PM

## 2023-12-08 NOTE — ED NOTES
Patient discharged in ambulatory state after verbally confirming understanding of discharge paperwork and education provided by the ER physician. Patient advised to return to the ER for any worsening pain of any other symptoms.

## 2023-12-14 ENCOUNTER — NON-PROVIDER VISIT (OUTPATIENT)
Dept: CARDIOLOGY | Facility: MEDICAL CENTER | Age: 73
End: 2023-12-14
Payer: COMMERCIAL

## 2023-12-14 PROCEDURE — 93298 REM INTERROG DEV EVAL SCRMS: CPT | Performed by: INTERNAL MEDICINE

## 2023-12-14 NOTE — CARDIAC REMOTE MONITOR - SCAN
Device transmission reviewed. Device demonstrated appropriate function.       Electronically Signed by: Eli Tang M.D.    12/26/2023  8:07 AM

## 2023-12-18 ENCOUNTER — OFFICE VISIT (OUTPATIENT)
Dept: CARDIOLOGY | Facility: MEDICAL CENTER | Age: 73
End: 2023-12-18
Attending: INTERNAL MEDICINE
Payer: COMMERCIAL

## 2023-12-18 VITALS
WEIGHT: 175 LBS | DIASTOLIC BLOOD PRESSURE: 60 MMHG | HEIGHT: 73 IN | OXYGEN SATURATION: 93 % | SYSTOLIC BLOOD PRESSURE: 110 MMHG | HEART RATE: 63 BPM | BODY MASS INDEX: 23.19 KG/M2

## 2023-12-18 DIAGNOSIS — I97.89 POSTOPERATIVE ATRIAL FIBRILLATION (HCC): Chronic | ICD-10-CM

## 2023-12-18 DIAGNOSIS — I48.3 TYPICAL ATRIAL FLUTTER (HCC): Chronic | ICD-10-CM

## 2023-12-18 DIAGNOSIS — I10 PRIMARY HYPERTENSION: Chronic | ICD-10-CM

## 2023-12-18 DIAGNOSIS — I25.2 HISTORY OF MYOCARDIAL INFARCTION: ICD-10-CM

## 2023-12-18 DIAGNOSIS — E78.5 DYSLIPIDEMIA: Chronic | ICD-10-CM

## 2023-12-18 DIAGNOSIS — Z95.1 S/P CORONARY ARTERY BYPASS GRAFT X 5: ICD-10-CM

## 2023-12-18 DIAGNOSIS — K60.4 PERIRECTAL FISTULA: ICD-10-CM

## 2023-12-18 DIAGNOSIS — I25.83 CORONARY ARTERY DISEASE DUE TO LIPID RICH PLAQUE: Chronic | ICD-10-CM

## 2023-12-18 DIAGNOSIS — Z98.890 H/O CARDIAC RADIOFREQUENCY ABLATION: ICD-10-CM

## 2023-12-18 DIAGNOSIS — Z95.818 PRESENCE OF WATCHMAN LEFT ATRIAL APPENDAGE CLOSURE DEVICE: ICD-10-CM

## 2023-12-18 DIAGNOSIS — I48.91 POSTOPERATIVE ATRIAL FIBRILLATION (HCC): Chronic | ICD-10-CM

## 2023-12-18 DIAGNOSIS — I25.10 CORONARY ARTERY DISEASE DUE TO LIPID RICH PLAQUE: Chronic | ICD-10-CM

## 2023-12-18 DIAGNOSIS — I72.3 ILIAC ARTERY ANEURYSM, BILATERAL (HCC): ICD-10-CM

## 2023-12-18 PROCEDURE — 99211 OFF/OP EST MAY X REQ PHY/QHP: CPT | Performed by: INTERNAL MEDICINE

## 2023-12-18 PROCEDURE — 99214 OFFICE O/P EST MOD 30 MIN: CPT | Performed by: INTERNAL MEDICINE

## 2023-12-18 PROCEDURE — 3074F SYST BP LT 130 MM HG: CPT | Performed by: INTERNAL MEDICINE

## 2023-12-18 PROCEDURE — 3078F DIAST BP <80 MM HG: CPT | Performed by: INTERNAL MEDICINE

## 2023-12-18 ASSESSMENT — FIBROSIS 4 INDEX: FIB4 SCORE: 1.55

## 2023-12-18 NOTE — PROGRESS NOTES
Chief Complaint   Patient presents with    Hypertension    Coronary Artery Disease     F/v dx: Coronary artery disease due to lipid rich plaque - CABG    Other     F/v dx: S/P coronary artery bypass graft x 5         Subjective     Bill Roland Gomez is a 73 y.o. male who presents today for follow-up of his recent finding of atrial flutter which was seen in the setting of an angiogram given equivocal stress test he is angiogram showed patent grafts     Was in the ER 2 weeks ago for perirectal fistula found to have aneurysm in iliac arteries    Past Medical History:   Diagnosis Date    Arrhythmia     pt unaware    ASTHMA     uses inhaler once a day    Breath shortness     Bronchitis     Cataract 2022    IOL bilat    Coronary artery disease due to lipid rich plaque - CABG     High cholesterol     History of myocardial infarction     unaware of at the time    Implantable loop recorder present 04/04/2023    Pain 09/16/2022    intermittent in chest with exercise    Pneumonia     Postoperative atrial fibrillation (HCC) 2013 in setting of CABG     S/P coronary artery bypass graft x 5     Sleep apnea     no cpap    Snoring     Typical atrial flutter (HCC) sen 9/16/2022      Past Surgical History:   Procedure Laterality Date    CATARACT EXTRACTION WITH IOL Bilateral 2022    FISTULA IN ANO REPAIR  2018    MULTIPLE CORONARY ARTERY BYPASS ENDO VEIN HARVEST  12/07/2013    Performed by Mo Vincent M.D. at SURGERY [facility]    CHOLECYSTECTOMY  1996    OTHER CARDIAC SURGERY  2013    OTHER CARDIAC SURGERY      Loop Recorder     Family History   Problem Relation Age of Onset    Diabetes Mother     Stroke Mother     Cancer Father         None    Heart Disease Neg Hx      Social History     Socioeconomic History    Marital status:      Spouse name: Kaylee    Number of children: Not on file    Years of education: Not on file    Highest education level: Bachelor's degree (e.g., BA, AB, BS)   Occupational History      Employer: [employer]   Tobacco Use    Smoking status: Some Days     Current packs/day: 0.30     Average packs/day: 0.3 packs/day for 55.3 years (16.6 ttl pk-yrs)     Types: Cigarettes     Start date: 9/1/1968     Passive exposure: Past    Smokeless tobacco: Never    Tobacco comments:     I've quit many times over the years but I always start again   Vaping Use    Vaping Use: Never used   Substance and Sexual Activity    Alcohol use: Yes     Alcohol/week: 2.4 oz     Types: 4 Cans of beer per week     Comment: I usually will only drink once a week and it’s usually 3 or 4    Drug use: No    Sexual activity: Not on file   Other Topics Concern    Not on file   Social History Narrative    Not on file     Social Determinants of Health     Financial Resource Strain: Low Risk  (4/19/2023)    Overall Financial Resource Strain (CARDIA)     Difficulty of Paying Living Expenses: Not hard at all   Food Insecurity: No Food Insecurity (4/19/2023)    Hunger Vital Sign     Worried About Running Out of Food in the Last Year: Never true     Ran Out of Food in the Last Year: Never true   Transportation Needs: No Transportation Needs (4/19/2023)    PRAPARE - Transportation     Lack of Transportation (Medical): No     Lack of Transportation (Non-Medical): No   Physical Activity: Sufficiently Active (4/19/2023)    Exercise Vital Sign     Days of Exercise per Week: 6 days     Minutes of Exercise per Session: 60 min   Stress: Stress Concern Present (4/19/2023)    [employer] - Occupational Stress Questionnaire     Feeling of Stress : To some extent   Social Connections: Unknown (4/19/2023)    Social Connection and Isolation Panel [NHANES]     Frequency of Communication with Friends and Family: Twice a week     Frequency of Social Gatherings with Friends and Family: Patient refused     Attends Yarsani Services: 1 to 4 times per year     Active Member of Clubs or Organizations: No     Attends Club or Organization  "Meetings: Never     Marital Status:    Intimate Partner Violence: Not on file   Housing Stability: Low Risk  (4/19/2023)    Housing Stability Vital Sign     Unable to Pay for Housing in the Last Year: No     Number of Places Lived in the Last Year: 1     Unstable Housing in the Last Year: No     Allergies   Allergen Reactions    Cat Hair Extract Runny Nose and Itching     .    Xarelto [Rivaroxaban]      nausea     Outpatient Encounter Medications as of 12/18/2023   Medication Sig Dispense Refill    aspirin (ASA) 81 MG Chew Tab chewable tablet Chew 1 Tablet every day. 100 Tablet 3    rosuvastatin (CRESTOR) 10 MG Tab Take 1 Tablet by mouth every day. 90 Tablet 3    HYDROcodone/acetaminophen (NORCO)  MG Tab Take 1 Tablet by mouth every 6 hours as needed for Severe Pain.      ezetimibe (ZETIA) 10 MG Tab Take 1 Tablet by mouth every day. 90 Tablet 3     No facility-administered encounter medications on file as of 12/18/2023.     ROS           Objective     /60 (BP Location: Left arm, Patient Position: Sitting, BP Cuff Size: Adult)   Pulse 63   Ht 1.854 m (6' 1\")   Wt 79.4 kg (175 lb)   SpO2 93%   BMI 23.09 kg/m²     Physical Exam  Constitutional:       General: He is not in acute distress.     Appearance: He is not diaphoretic.   Eyes:      General: No scleral icterus.  Neck:      Vascular: No JVD.   Cardiovascular:      Rate and Rhythm: Normal rate.      Heart sounds: Normal heart sounds. No murmur heard.     No friction rub. No gallop.   Pulmonary:      Effort: No respiratory distress.      Breath sounds: No wheezing or rales.   Abdominal:      General: Bowel sounds are normal.      Palpations: Abdomen is soft.   Musculoskeletal:      Right lower leg: No edema.      Left lower leg: No edema.   Skin:     Findings: No rash.   Neurological:      Mental Status: He is alert. Mental status is at baseline.   Psychiatric:         Mood and Affect: Mood normal.                Assessment & Plan     1. " Typical atrial flutter (HCC) seen 9/16/2022        2. Postoperative atrial fibrillation (HCC) 2013 in setting of CABG        3. H/O cardiac radiofrequency ablation. 12/7/22. Dr Ferrell         4. Presence of Watchman left atrial appendage closure device        5. Primary hypertension        6. Coronary artery disease due to lipid rich plaque - CABG        7. S/P coronary artery bypass graft x 5        8. History of myocardial infarction        9. Dyslipidemia        10. Perirectal fistula  Referral to General Surgery          Medical Decision Making: Today's Assessment/Status/Plan:        It was my pleasure to meet with Mr. Gomez.    We addressed the management of hypertension at today's visit. Blood pressure is well controlled.  We specifically assessed the labs on hypertension treatment    We addressed the management of dyslipidemia and atherosclerosis at today's visit. He is on appropriate statin.    We addressed the management of atherosclerotic artery disease.  He is on proper antiplatelet, cholesterol management and beta-blockers as appropriate.  We addressed the potential side effects and laboratory follow-up for these medications.    We addressed the management of atrial fibrillation.  He is on proper anticoagulation cholesterol management and rate or rhythm control as appropriate.  We addressed the potential side effects and laboratory follow-up for these medications.  HAS PLANNED ATRIAL FLUTTER ABALTION IN FEB    We addressed the management of chronic anticoagulation at today's visit. He understands the risks and benefits of chronic anticoagulation.  We reviewed and verified the results of annual testing for anemia and kidney function.      I will see Mr. Gomez back in 1 year time and encouraged him to follow up with us over the phone or electronically using my MyChart as issues arise.    It is my pleasure to participate in the care of Mr. Gomez.  Please do not hesitate to contact me with  questions or concerns.    Sukhjinder Stephenson MD PhD [facility]  Cardiologist [facility]    Please note that this dictation was created using voice recognition software. There may be errors I did not discover before finalizing the note.

## 2023-12-21 LAB — EKG IMPRESSION: NORMAL

## 2023-12-22 ENCOUNTER — TELEPHONE (OUTPATIENT)
Dept: CARDIOLOGY | Facility: MEDICAL CENTER | Age: 73
End: 2023-12-22
Payer: COMMERCIAL

## 2023-12-22 NOTE — TELEPHONE ENCOUNTER
FYI - remote transmission received via home monitor, full report scanned into Media.    Pt continues to be in AFL with slow ventricular response with V rate intermittently <30bpm in nocturnal episodes, longest nocturnal episode 28min.

## 2024-01-12 ENCOUNTER — APPOINTMENT (OUTPATIENT)
Dept: ADMISSIONS | Facility: MEDICAL CENTER | Age: 74
End: 2024-01-12
Attending: INTERNAL MEDICINE
Payer: COMMERCIAL

## 2024-01-14 ENCOUNTER — NON-PROVIDER VISIT (OUTPATIENT)
Dept: CARDIOLOGY | Facility: MEDICAL CENTER | Age: 74
End: 2024-01-14
Payer: COMMERCIAL

## 2024-01-14 PROCEDURE — 93298 REM INTERROG DEV EVAL SCRMS: CPT | Mod: 26 | Performed by: INTERNAL MEDICINE

## 2024-01-15 NOTE — CARDIAC REMOTE MONITOR - SCAN
Device transmission reviewed. Device demonstrated appropriate function.       Electronically Signed by: Ivan Kelly M.D.    1/15/2024  12:10 PM

## 2024-01-22 ENCOUNTER — PRE-ADMISSION TESTING (OUTPATIENT)
Dept: ADMISSIONS | Facility: MEDICAL CENTER | Age: 74
End: 2024-01-22
Attending: INTERNAL MEDICINE
Payer: COMMERCIAL

## 2024-01-22 DIAGNOSIS — I25.10 CORONARY ARTERY DISEASE INVOLVING NATIVE CORONARY ARTERY OF NATIVE HEART WITHOUT ANGINA PECTORIS: ICD-10-CM

## 2024-01-23 RX ORDER — ROSUVASTATIN CALCIUM 10 MG/1
10 TABLET, COATED ORAL DAILY
Qty: 90 TABLET | Refills: 3 | Status: SHIPPED | OUTPATIENT
Start: 2024-01-23

## 2024-01-23 NOTE — TELEPHONE ENCOUNTER
Is the patient due for a refill? Yes    Was the patient seen the past year? Yes    Date of last office visit: 12/18/2023    Does the patient have an upcoming appointment?  No    Provider to refill:CW    Does the patients insurance require a 100 day supply?  No

## 2024-02-08 ENCOUNTER — PRE-ADMISSION TESTING (OUTPATIENT)
Dept: ADMISSIONS | Facility: MEDICAL CENTER | Age: 74
End: 2024-02-08
Attending: INTERNAL MEDICINE
Payer: COMMERCIAL

## 2024-02-08 DIAGNOSIS — Z01.810 PRE-OPERATIVE CARDIOVASCULAR EXAMINATION: ICD-10-CM

## 2024-02-08 DIAGNOSIS — Z01.812 PRE-OPERATIVE LABORATORY EXAMINATION: ICD-10-CM

## 2024-02-08 LAB
ALBUMIN SERPL BCP-MCNC: 4.1 G/DL (ref 3.2–4.9)
ALBUMIN/GLOB SERPL: 1.6 G/DL
ALP SERPL-CCNC: 69 U/L (ref 30–99)
ALT SERPL-CCNC: 13 U/L (ref 2–50)
ANION GAP SERPL CALC-SCNC: 11 MMOL/L (ref 7–16)
AST SERPL-CCNC: 17 U/L (ref 12–45)
BASOPHILS # BLD AUTO: 0.5 % (ref 0–1.8)
BASOPHILS # BLD: 0.06 K/UL (ref 0–0.12)
BILIRUB SERPL-MCNC: 0.4 MG/DL (ref 0.1–1.5)
BUN SERPL-MCNC: 27 MG/DL (ref 8–22)
CALCIUM ALBUM COR SERPL-MCNC: 9.3 MG/DL (ref 8.5–10.5)
CALCIUM SERPL-MCNC: 9.4 MG/DL (ref 8.5–10.5)
CHLORIDE SERPL-SCNC: 101 MMOL/L (ref 96–112)
CO2 SERPL-SCNC: 25 MMOL/L (ref 20–33)
CREAT SERPL-MCNC: 1.07 MG/DL (ref 0.5–1.4)
EKG IMPRESSION: NORMAL
EOSINOPHIL # BLD AUTO: 0.23 K/UL (ref 0–0.51)
EOSINOPHIL NFR BLD: 2 % (ref 0–6.9)
ERYTHROCYTE [DISTWIDTH] IN BLOOD BY AUTOMATED COUNT: 51.2 FL (ref 35.9–50)
GFR SERPLBLD CREATININE-BSD FMLA CKD-EPI: 73 ML/MIN/1.73 M 2
GLOBULIN SER CALC-MCNC: 2.6 G/DL (ref 1.9–3.5)
GLUCOSE SERPL-MCNC: 111 MG/DL (ref 65–99)
HCT VFR BLD AUTO: 46.4 % (ref 42–52)
HGB BLD-MCNC: 15.5 G/DL (ref 14–18)
IMM GRANULOCYTES # BLD AUTO: 0.05 K/UL (ref 0–0.11)
IMM GRANULOCYTES NFR BLD AUTO: 0.4 % (ref 0–0.9)
INR PPP: 1.19 (ref 0.87–1.13)
LYMPHOCYTES # BLD AUTO: 2.54 K/UL (ref 1–4.8)
LYMPHOCYTES NFR BLD: 22.4 % (ref 22–41)
MCH RBC QN AUTO: 31.5 PG (ref 27–33)
MCHC RBC AUTO-ENTMCNC: 33.4 G/DL (ref 32.3–36.5)
MCV RBC AUTO: 94.3 FL (ref 81.4–97.8)
MONOCYTES # BLD AUTO: 1.04 K/UL (ref 0–0.85)
MONOCYTES NFR BLD AUTO: 9.2 % (ref 0–13.4)
NEUTROPHILS # BLD AUTO: 7.4 K/UL (ref 1.82–7.42)
NEUTROPHILS NFR BLD: 65.5 % (ref 44–72)
NRBC # BLD AUTO: 0 K/UL
NRBC BLD-RTO: 0 /100 WBC (ref 0–0.2)
PLATELET # BLD AUTO: 246 K/UL (ref 164–446)
PMV BLD AUTO: 9.2 FL (ref 9–12.9)
POTASSIUM SERPL-SCNC: 4.8 MMOL/L (ref 3.6–5.5)
PROT SERPL-MCNC: 6.7 G/DL (ref 6–8.2)
PROTHROMBIN TIME: 15.2 SEC (ref 12–14.6)
RBC # BLD AUTO: 4.92 M/UL (ref 4.7–6.1)
SODIUM SERPL-SCNC: 137 MMOL/L (ref 135–145)
WBC # BLD AUTO: 11.3 K/UL (ref 4.8–10.8)

## 2024-02-08 PROCEDURE — 36415 COLL VENOUS BLD VENIPUNCTURE: CPT

## 2024-02-08 PROCEDURE — 93010 ELECTROCARDIOGRAM REPORT: CPT | Performed by: STUDENT IN AN ORGANIZED HEALTH CARE EDUCATION/TRAINING PROGRAM

## 2024-02-08 PROCEDURE — 93005 ELECTROCARDIOGRAM TRACING: CPT

## 2024-02-08 PROCEDURE — 85025 COMPLETE CBC W/AUTO DIFF WBC: CPT

## 2024-02-08 PROCEDURE — 80053 COMPREHEN METABOLIC PANEL: CPT

## 2024-02-08 PROCEDURE — 85610 PROTHROMBIN TIME: CPT

## 2024-02-12 ENCOUNTER — APPOINTMENT (OUTPATIENT)
Dept: CARDIOLOGY | Facility: MEDICAL CENTER | Age: 74
End: 2024-02-12
Attending: NURSE PRACTITIONER
Payer: COMMERCIAL

## 2024-02-12 ENCOUNTER — HOSPITAL ENCOUNTER (OUTPATIENT)
Facility: MEDICAL CENTER | Age: 74
End: 2024-02-12
Attending: INTERNAL MEDICINE | Admitting: INTERNAL MEDICINE
Payer: COMMERCIAL

## 2024-02-12 ENCOUNTER — ANESTHESIA EVENT (OUTPATIENT)
Dept: CARDIOLOGY | Facility: MEDICAL CENTER | Age: 74
End: 2024-02-12
Payer: COMMERCIAL

## 2024-02-12 ENCOUNTER — ANESTHESIA (OUTPATIENT)
Dept: CARDIOLOGY | Facility: MEDICAL CENTER | Age: 74
End: 2024-02-12
Payer: COMMERCIAL

## 2024-02-12 ENCOUNTER — PHARMACY VISIT (OUTPATIENT)
Dept: PHARMACY | Facility: MEDICAL CENTER | Age: 74
End: 2024-02-12
Payer: COMMERCIAL

## 2024-02-12 VITALS
RESPIRATION RATE: 19 BRPM | SYSTOLIC BLOOD PRESSURE: 112 MMHG | HEIGHT: 72 IN | OXYGEN SATURATION: 93 % | TEMPERATURE: 97.9 F | DIASTOLIC BLOOD PRESSURE: 55 MMHG | BODY MASS INDEX: 23.35 KG/M2 | HEART RATE: 58 BPM | WEIGHT: 172.4 LBS

## 2024-02-12 DIAGNOSIS — I48.0 PAF (PAROXYSMAL ATRIAL FIBRILLATION) (HCC): ICD-10-CM

## 2024-02-12 DIAGNOSIS — Z98.890 H/O CARDIAC RADIOFREQUENCY ABLATION: ICD-10-CM

## 2024-02-12 LAB
ACT BLD: 250 SEC (ref 74–137)
ACT BLD: 287 SEC (ref 74–137)
EKG IMPRESSION: NORMAL

## 2024-02-12 PROCEDURE — 160036 HCHG PACU - EA ADDL 30 MINS PHASE I

## 2024-02-12 PROCEDURE — 93623 PRGRMD STIMJ&PACG IV RX NFS: CPT | Mod: 26 | Performed by: INTERNAL MEDICINE

## 2024-02-12 PROCEDURE — 93005 ELECTROCARDIOGRAM TRACING: CPT | Performed by: INTERNAL MEDICINE

## 2024-02-12 PROCEDURE — 700101 HCHG RX REV CODE 250: Performed by: ANESTHESIOLOGY

## 2024-02-12 PROCEDURE — 700101 HCHG RX REV CODE 250

## 2024-02-12 PROCEDURE — 93325 DOPPLER ECHO COLOR FLOW MAPG: CPT

## 2024-02-12 PROCEDURE — RXMED WILLOW AMBULATORY MEDICATION CHARGE: Performed by: NURSE PRACTITIONER

## 2024-02-12 PROCEDURE — 93656 COMPRE EP EVAL ABLTJ ATR FIB: CPT | Performed by: INTERNAL MEDICINE

## 2024-02-12 PROCEDURE — 85347 COAGULATION TIME ACTIVATED: CPT

## 2024-02-12 PROCEDURE — RXMED WILLOW AMBULATORY MEDICATION CHARGE: Performed by: INTERNAL MEDICINE

## 2024-02-12 PROCEDURE — 700101 HCHG RX REV CODE 250: Performed by: INTERNAL MEDICINE

## 2024-02-12 PROCEDURE — 93010 ELECTROCARDIOGRAM REPORT: CPT | Performed by: INTERNAL MEDICINE

## 2024-02-12 PROCEDURE — 700105 HCHG RX REV CODE 258: Performed by: INTERNAL MEDICINE

## 2024-02-12 PROCEDURE — 93655 ICAR CATH ABLTJ DSCRT ARRHYT: CPT | Performed by: INTERNAL MEDICINE

## 2024-02-12 PROCEDURE — 160035 HCHG PACU - 1ST 60 MINS PHASE I

## 2024-02-12 PROCEDURE — 700105 HCHG RX REV CODE 258: Performed by: ANESTHESIOLOGY

## 2024-02-12 PROCEDURE — 93312 ECHO TRANSESOPHAGEAL: CPT | Mod: 26 | Performed by: INTERNAL MEDICINE

## 2024-02-12 PROCEDURE — C1894 INTRO/SHEATH, NON-LASER: HCPCS

## 2024-02-12 PROCEDURE — 160002 HCHG RECOVERY MINUTES (STAT)

## 2024-02-12 PROCEDURE — 700111 HCHG RX REV CODE 636 W/ 250 OVERRIDE (IP)

## 2024-02-12 PROCEDURE — 700111 HCHG RX REV CODE 636 W/ 250 OVERRIDE (IP): Performed by: ANESTHESIOLOGY

## 2024-02-12 RX ORDER — HEPARIN SODIUM 1000 [USP'U]/ML
INJECTION, SOLUTION INTRAVENOUS; SUBCUTANEOUS
Status: COMPLETED
Start: 2024-02-12 | End: 2024-02-12

## 2024-02-12 RX ORDER — OXYCODONE HCL 5 MG/5 ML
5 SOLUTION, ORAL ORAL
Status: DISCONTINUED | OUTPATIENT
Start: 2024-02-12 | End: 2024-02-12 | Stop reason: HOSPADM

## 2024-02-12 RX ORDER — SODIUM CHLORIDE, SODIUM LACTATE, POTASSIUM CHLORIDE, CALCIUM CHLORIDE 600; 310; 30; 20 MG/100ML; MG/100ML; MG/100ML; MG/100ML
INJECTION, SOLUTION INTRAVENOUS
Status: DISCONTINUED | OUTPATIENT
Start: 2024-02-12 | End: 2024-02-12 | Stop reason: SURG

## 2024-02-12 RX ORDER — OXYCODONE HCL 5 MG/5 ML
10 SOLUTION, ORAL ORAL
Status: DISCONTINUED | OUTPATIENT
Start: 2024-02-12 | End: 2024-02-12 | Stop reason: HOSPADM

## 2024-02-12 RX ORDER — SODIUM CHLORIDE, SODIUM LACTATE, POTASSIUM CHLORIDE, CALCIUM CHLORIDE 600; 310; 30; 20 MG/100ML; MG/100ML; MG/100ML; MG/100ML
INJECTION, SOLUTION INTRAVENOUS CONTINUOUS
Status: DISCONTINUED | OUTPATIENT
Start: 2024-02-12 | End: 2024-02-12 | Stop reason: HOSPADM

## 2024-02-12 RX ORDER — HYDROMORPHONE HYDROCHLORIDE 1 MG/ML
0.1 INJECTION, SOLUTION INTRAMUSCULAR; INTRAVENOUS; SUBCUTANEOUS
Status: DISCONTINUED | OUTPATIENT
Start: 2024-02-12 | End: 2024-02-12 | Stop reason: HOSPADM

## 2024-02-12 RX ORDER — DEXAMETHASONE SODIUM PHOSPHATE 4 MG/ML
INJECTION, SOLUTION INTRA-ARTICULAR; INTRALESIONAL; INTRAMUSCULAR; INTRAVENOUS; SOFT TISSUE PRN
Status: DISCONTINUED | OUTPATIENT
Start: 2024-02-12 | End: 2024-02-12 | Stop reason: SURG

## 2024-02-12 RX ORDER — BUPIVACAINE HYDROCHLORIDE 5 MG/ML
INJECTION, SOLUTION EPIDURAL; INTRACAUDAL
Status: COMPLETED
Start: 2024-02-12 | End: 2024-02-12

## 2024-02-12 RX ORDER — EPHEDRINE SULFATE 50 MG/ML
INJECTION, SOLUTION INTRAVENOUS PRN
Status: DISCONTINUED | OUTPATIENT
Start: 2024-02-12 | End: 2024-02-12 | Stop reason: SURG

## 2024-02-12 RX ORDER — LIDOCAINE HYDROCHLORIDE 20 MG/ML
INJECTION, SOLUTION INFILTRATION; PERINEURAL
Status: COMPLETED
Start: 2024-02-12 | End: 2024-02-12

## 2024-02-12 RX ORDER — EPHEDRINE SULFATE 50 MG/ML
5 INJECTION, SOLUTION INTRAVENOUS
Status: DISCONTINUED | OUTPATIENT
Start: 2024-02-12 | End: 2024-02-12 | Stop reason: HOSPADM

## 2024-02-12 RX ORDER — SODIUM CHLORIDE, SODIUM LACTATE, POTASSIUM CHLORIDE, CALCIUM CHLORIDE 600; 310; 30; 20 MG/100ML; MG/100ML; MG/100ML; MG/100ML
INJECTION, SOLUTION INTRAVENOUS CONTINUOUS
Status: ACTIVE | OUTPATIENT
Start: 2024-02-12 | End: 2024-02-12

## 2024-02-12 RX ORDER — PROTAMINE SULFATE 10 MG/ML
INJECTION, SOLUTION INTRAVENOUS
Status: COMPLETED
Start: 2024-02-12 | End: 2024-02-12

## 2024-02-12 RX ORDER — ISOPROTERENOL HYDROCHLORIDE 0.2 MG/ML
INJECTION, SOLUTION INTRAVENOUS
Status: COMPLETED
Start: 2024-02-12 | End: 2024-02-12

## 2024-02-12 RX ORDER — HALOPERIDOL 5 MG/ML
1 INJECTION INTRAMUSCULAR
Status: DISCONTINUED | OUTPATIENT
Start: 2024-02-12 | End: 2024-02-12 | Stop reason: HOSPADM

## 2024-02-12 RX ORDER — LIDOCAINE HYDROCHLORIDE 20 MG/ML
INJECTION, SOLUTION EPIDURAL; INFILTRATION; INTRACAUDAL; PERINEURAL PRN
Status: DISCONTINUED | OUTPATIENT
Start: 2024-02-12 | End: 2024-02-12 | Stop reason: SURG

## 2024-02-12 RX ORDER — METOPROLOL TARTRATE 1 MG/ML
1 INJECTION, SOLUTION INTRAVENOUS
Status: DISCONTINUED | OUTPATIENT
Start: 2024-02-12 | End: 2024-02-12 | Stop reason: HOSPADM

## 2024-02-12 RX ORDER — HYDROMORPHONE HYDROCHLORIDE 1 MG/ML
0.2 INJECTION, SOLUTION INTRAMUSCULAR; INTRAVENOUS; SUBCUTANEOUS
Status: DISCONTINUED | OUTPATIENT
Start: 2024-02-12 | End: 2024-02-12 | Stop reason: HOSPADM

## 2024-02-12 RX ORDER — ONDANSETRON 2 MG/ML
4 INJECTION INTRAMUSCULAR; INTRAVENOUS
Status: DISCONTINUED | OUTPATIENT
Start: 2024-02-12 | End: 2024-02-12 | Stop reason: HOSPADM

## 2024-02-12 RX ORDER — HYDROMORPHONE HYDROCHLORIDE 1 MG/ML
0.4 INJECTION, SOLUTION INTRAMUSCULAR; INTRAVENOUS; SUBCUTANEOUS
Status: DISCONTINUED | OUTPATIENT
Start: 2024-02-12 | End: 2024-02-12 | Stop reason: HOSPADM

## 2024-02-12 RX ORDER — HYDRALAZINE HYDROCHLORIDE 20 MG/ML
5 INJECTION INTRAMUSCULAR; INTRAVENOUS
Status: DISCONTINUED | OUTPATIENT
Start: 2024-02-12 | End: 2024-02-12 | Stop reason: HOSPADM

## 2024-02-12 RX ORDER — HEPARIN SODIUM 200 [USP'U]/100ML
INJECTION, SOLUTION INTRAVENOUS
Status: COMPLETED
Start: 2024-02-12 | End: 2024-02-12

## 2024-02-12 RX ORDER — OMEPRAZOLE 20 MG/1
20 CAPSULE, DELAYED RELEASE ORAL
Qty: 30 CAPSULE | Refills: 0 | Status: SHIPPED | OUTPATIENT
Start: 2024-02-12

## 2024-02-12 RX ADMIN — EPHEDRINE SULFATE 10 MG: 50 INJECTION, SOLUTION INTRAVENOUS at 07:55

## 2024-02-12 RX ADMIN — SUGAMMADEX 200 MG: 100 INJECTION, SOLUTION INTRAVENOUS at 09:16

## 2024-02-12 RX ADMIN — LIDOCAINE HYDROCHLORIDE 75 MG: 20 INJECTION, SOLUTION EPIDURAL; INFILTRATION; INTRACAUDAL at 07:55

## 2024-02-12 RX ADMIN — SODIUM CHLORIDE, POTASSIUM CHLORIDE, SODIUM LACTATE AND CALCIUM CHLORIDE: 600; 310; 30; 20 INJECTION, SOLUTION INTRAVENOUS at 07:45

## 2024-02-12 RX ADMIN — LIDOCAINE HYDROCHLORIDE 0.5 ML: 10 INJECTION, SOLUTION INFILTRATION; PERINEURAL at 06:34

## 2024-02-12 RX ADMIN — ISOPROTERENOL HYDROCHLORIDE 0.2 MG: 0.2 INJECTION, SOLUTION INTRACARDIAC; INTRAMUSCULAR; INTRAVENOUS; SUBCUTANEOUS at 09:02

## 2024-02-12 RX ADMIN — PROPOFOL 75 MG: 10 INJECTION, EMULSION INTRAVENOUS at 07:55

## 2024-02-12 RX ADMIN — HEPARIN SODIUM 6000 UNITS: 200 INJECTION, SOLUTION INTRAVENOUS at 07:30

## 2024-02-12 RX ADMIN — HEPARIN SODIUM: 1000 INJECTION, SOLUTION INTRAVENOUS; SUBCUTANEOUS at 08:43

## 2024-02-12 RX ADMIN — ROCURONIUM BROMIDE 75 MG: 10 INJECTION, SOLUTION INTRAVENOUS at 07:55

## 2024-02-12 RX ADMIN — FENTANYL CITRATE 100 MCG: 50 INJECTION, SOLUTION INTRAMUSCULAR; INTRAVENOUS at 07:55

## 2024-02-12 RX ADMIN — BUPIVACAINE HYDROCHLORIDE: 5 INJECTION, SOLUTION EPIDURAL; INTRACAUDAL at 08:04

## 2024-02-12 RX ADMIN — SODIUM CHLORIDE, POTASSIUM CHLORIDE, SODIUM LACTATE AND CALCIUM CHLORIDE: 600; 310; 30; 20 INJECTION, SOLUTION INTRAVENOUS at 06:34

## 2024-02-12 RX ADMIN — LIDOCAINE HYDROCHLORIDE: 20 INJECTION, SOLUTION INFILTRATION; PERINEURAL at 08:04

## 2024-02-12 RX ADMIN — PROPOFOL 75 MG: 10 INJECTION, EMULSION INTRAVENOUS at 07:56

## 2024-02-12 RX ADMIN — PROTAMINE SULFATE 40 MG: 10 INJECTION, SOLUTION INTRAVENOUS at 09:15

## 2024-02-12 RX ADMIN — DEXAMETHASONE SODIUM PHOSPHATE 8 MG: 4 INJECTION INTRA-ARTICULAR; INTRALESIONAL; INTRAMUSCULAR; INTRAVENOUS; SOFT TISSUE at 07:55

## 2024-02-12 ASSESSMENT — FIBROSIS 4 INDEX
FIB4 SCORE: 1.4
FIB4 SCORE: 1.4

## 2024-02-12 ASSESSMENT — PAIN SCALES - GENERAL: PAIN_LEVEL: 0

## 2024-02-12 NOTE — PROGRESS NOTES
Seen in afternoon same day discharge EP rounds.  S/P ablation of paroxysmal atrial fibrillation with PVI and ablation of focal atrial tachycardia from the CS ostium.  Prior CTI line checked and found to be blocked at baseline.       Conclusions per Dr Ferrell's Op Note dated 2/12/24:  Electrophysiological Findings:  Sinus cycle length 1010 msec  Intervals- A-H 50 msec  H-V 56 msec  QRS 95 msec   msec   msec  AV block  ms  AVERP: 500/310 ms  Total RF time: 944 sec  Fluoro time: 0 min  Complications: None  Impression:  1. Atrial fibrillation, paroxysmal  2. Successful isolation of all four pulmonary veins  3. Successful focal atrial tachycardia ablation from CS ostium  4. Baseline CTI block     Monitored rhythm has been sinus luis in the 50s throughout monitored recovery.  Vital signs are stable. Right femoral access site is clean and dry; there is no evidence of hematoma, significant ecchymosis, or pain at the site.  He has ambulated without problem.      patient has an active anticoagulation prescription (prior PRESTON closure with WATCHMAN.  He is aware that he will need 2 months OAC post ablation), we discussed importance of esophageal prophylaxis post ablation.      Discharge instructions discussed with patient:  [facility] Post Ablation Patient Instructions:  No lifting > 10 lbs x 1 week.    No soaking in baths, hot tubs, pools x 1 week.  May shower the day after discharge and take off groin dressings and leave uncovered.  Continue to monitor sites daily for warmth, redness, discolored drainage.  It is common to have a small lump in the area where the cather was (usually the size of a small marble); this will go away but takes approximately 6 weeks to normalize.   3.   Please take all medications as prescribed to you; please do not stop any medications prescribed post ablation unless directed by your healthcare provider.    4.   Please do not miss any doses of your  blood thinner (if you have been started on, or take chronic blood thinners) without discussion with your healthcare provider first.   5.   Please walk and take deep breaths after discharge.  After discharge, if  experiences neurological changes/signs of stroke, high fever, you should be seen in the emergency dept.   6.   It is possible you may experience some chest discomfort or chest tightness post ablation.  This is usually secondary to inflammation and irritation of the tissues at the area of the ablation.  If this occurs, it is advised to try 400 mg of Ibuprofen with food as needed up to three times a day for a maximum of two days.  This should help to decrease pain and tissue inflammation.          **Please notify the office ([phone]) if this occurs.         ** DO NOT TAKE Ibuprofen IF HISTORY OF SIGNIFICANT BLEEDING OR KIDNEY DISEASE WITHOUT DISCUSSING WITH YOUR CARDIOLOGY PROVIDER FIRST.          ** If pain becomes severe or you have additional symptoms you may need to be medically evaluated; please contact the cardiology office ([phone]) for further direction.   7. It is possible that you may experience arrhythmia/Atrial Fibrillation post ablation.  This is secondary to irritation and inflammation of the cardiac tissues from the ablation.  If you have atrial fibrillation all day or feel poorly with it, please notify your cardiologist's via phone ([phone]) or [employer].    8.  Please contact call our office ([phone]) or message via [employer] message if you have any questions or concerns post procedurally.  9. You need to be seen for post ablation follow up 2-4 weeks post procedure.  If you do not have a follow up appointment scheduled, please call [phone] to schedule your follow up appointment.

## 2024-02-12 NOTE — ANESTHESIA PREPROCEDURE EVALUATION
Date/Time: 02/12/24 0800    Scheduled providers: Catrachito Ferrell M.D.; Leander Clemons D.O.    Procedure: CL-EP ABLATION ATRIAL FIBRILLATION    Diagnosis: PAF (paroxysmal atrial fibrillation) (HCC) [I48.0]    Indications: See Associated Dx    Location: [facility] Cath Lab - [facility]            Relevant Problems   CARDIAC   (positive) Coronary artery disease due to lipid rich plaque - CABG   (positive) Iliac artery aneurysm, bilateral (HCC)   (positive) Postoperative atrial fibrillation (HCC) 2013 in setting of CABG   (positive) Primary hypertension   (positive) Typical atrial flutter (HCC) seen 9/16/2022   (positive) Venous insufficiency of leg       Physical Exam    Airway   Mallampati: II  TM distance: >3 FB  Neck ROM: full       Cardiovascular - normal exam  Rhythm: irregular  Rate: normal  (-) murmur     Dental - normal exam           Pulmonary - normal exam  Breath sounds clear to auscultation     Abdominal    Neurological - normal exam                   Anesthesia Plan    ASA 3   ASA physical status 3 criteria: moderate reduction of ejection fraction    Plan - general       Airway plan will be ETT          Induction: intravenous    Postoperative Plan: Postoperative administration of opioids is intended.    Pertinent diagnostic labs and testing reviewed    Informed Consent:    Anesthetic plan and risks discussed with patient.    Use of blood products discussed with: patient whom consented to blood products.

## 2024-02-12 NOTE — H&P
"[facility]  Electrophysiology Pre-procedure H&P    DOS:2/12/2024    Planned Procedure: AF ablation    Chief complaint/Reason for Procedure: Afib    HPI: 75 y/o M with pAF/AFL for ablation      Past Medical History:   Diagnosis Date    Anesthesia     pt felt fentanyl gave him \"hangover for 3 days\"    Arrhythmia     pt unaware    ASTHMA     prn    Breath shortness     Bronchitis     Cataract 2022    IOL bilat    Coronary artery disease due to lipid rich plaque - CABG     High cholesterol     History of myocardial infarction     unaware of at the time    Implantable loop recorder present 04/04/2023    Pain 09/16/2022    intermittent in chest with exercise    Pneumonia     Postoperative atrial fibrillation (HCC) 2013 in setting of CABG     Primary hypertension     S/P coronary artery bypass graft x 5     Sleep apnea     no cpap    Snoring     Typical atrial flutter (HCC) sen 9/16/2022        Past Surgical History:   Procedure Laterality Date    CATARACT EXTRACTION WITH IOL Bilateral 2022    FISTULA IN ANO REPAIR  2018    MULTIPLE CORONARY ARTERY BYPASS ENDO VEIN HARVEST  12/07/2013    Performed by Mo Vincent M.D. at SURGERY [facility]    CHOLECYSTECTOMY  1996    OTHER CARDIAC SURGERY      watchman    OTHER CARDIAC SURGERY      Loop Recorder       Social History     Socioeconomic History    Marital status:      Spouse name: Kaylee    Number of children: Not on file    Years of education: Not on file    Highest education level: Bachelor's degree (e.g., BA, AB, BS)   Occupational History     Employer: [employer]   Tobacco Use    Smoking status: Every Day     Current packs/day: 0.30     Average packs/day: 0.4 packs/day for 77.2 years (27.5 ttl pk-yrs)     Types: Cigarettes     Start date: 9/1/1968     Passive exposure: Past    Smokeless tobacco: Never    Tobacco comments:     I've quit many times over the years but I always start again - smokes half pack daily   Vaping Use    Vaping Use: " Never used   Substance and Sexual Activity    Alcohol use: Yes     Alcohol/week: 2.4 oz     Types: 4 Cans of beer per week     Comment: I usually will only drink once a week and it’s usually 3    Drug use: No    Sexual activity: Not on file   Other Topics Concern    Not on file   Social History Narrative    Not on file     Social Determinants of Health     Financial Resource Strain: Low Risk  (4/19/2023)    Overall Financial Resource Strain (CARDIA)     Difficulty of Paying Living Expenses: Not hard at all   Food Insecurity: No Food Insecurity (4/19/2023)    Hunger Vital Sign     Worried About Running Out of Food in the Last Year: Never true     Ran Out of Food in the Last Year: Never true   Transportation Needs: No Transportation Needs (4/19/2023)    PRAPARE - Transportation     Lack of Transportation (Medical): No     Lack of Transportation (Non-Medical): No   Physical Activity: Sufficiently Active (4/19/2023)    Exercise Vital Sign     Days of Exercise per Week: 6 days     Minutes of Exercise per Session: 60 min   Stress: Stress Concern Present (4/19/2023)    [employer] - Occupational Stress Questionnaire     Feeling of Stress : To some extent   Social Connections: Unknown (4/19/2023)    Social Connection and Isolation Panel [NHANES]     Frequency of Communication with Friends and Family: Twice a week     Frequency of Social Gatherings with Friends and Family: Patient refused     Attends Christianity Services: 1 to 4 times per year     Active Member of Clubs or Organizations: No     Attends Club or Organization Meetings: Never     Marital Status:    Intimate Partner Violence: Not on file   Housing Stability: Low Risk  (4/19/2023)    Housing Stability Vital Sign     Unable to Pay for Housing in the Last Year: No     Number of Places Lived in the Last Year: 1     Unstable Housing in the Last Year: No       Family History   Problem Relation Age of Onset    Diabetes Mother     Stroke  Mother     Cancer Father         None    Heart Disease Neg Hx        Allergies   Allergen Reactions    Cat Hair Extract Runny Nose and Itching     .    Xarelto [Rivaroxaban]      nausea       Current Facility-Administered Medications   Medication Dose Route Frequency Provider Last Rate Last Admin    lidocaine (Xylocaine) 1 % injection 0.5 mL  0.5 mL Intradermal Once PRN Catrachito Ferrell M.D.   0.5 mL at 02/12/24 0634    lactated ringers infusion   Intravenous Continuous Catrachito Ferrell M.D. 10 mL/hr at 02/12/24 0634 New Bag at 02/12/24 0634    BUPIVACAINE HCL (PF) 0.5 % INJ SOLN             HEPARIN SODIUM (PORCINE) 1000 UNIT/ML INJ SOLN             LIDOCAINE HCL 2 % INJ SOLN             HEPARIN (PORCINE) IN NACL 2000-0.9 UNIT/L-% IV SOLN             SUGAMMADEX SODIUM 200 MG/2ML IV SOLN             FENTANYL CITRATE (PF) 0.05 MG/ML INJ SOLN (WRAPPED)                Physical Exam:  Vitals:    02/12/24 0600 02/12/24 0619   BP:  123/61   Pulse:  (!) 58   Resp:  16   Temp:  36.9 °C (98.5 °F)   TempSrc:  Temporal   SpO2:  95%   Weight: 79.4 kg (175 lb) 78.2 kg (172 lb 6.4 oz)   Height:  1.829 m (6')     General appearance: NAD, conversant   Neck: Trachea midline; FROM, supple, no thyromegaly or lymphadenopathy  CV: RRR, no MRGs, no JVD   Extremities: No peripheral edema or extremity lymphadenopathy  Skin: Normal temperature, turgor and texture; no rash, ulcers or subcutaneous nodules  Psych: Appropriate affect, alert and oriented to person, place and time    Data:  Lab Results   Component Value Date/Time    CHOLSTRLTOT 146 10/09/2023 06:35 AM    LDL 76 10/09/2023 06:35 AM    HDL 56 10/09/2023 06:35 AM    TRIGLYCERIDE 69 10/09/2023 06:35 AM       Lab Results   Component Value Date/Time    SODIUM 137 02/08/2024 08:25 AM    POTASSIUM 4.8 02/08/2024 08:25 AM    CHLORIDE 101 02/08/2024 08:25 AM    CO2 25 02/08/2024 08:25 AM    GLUCOSE 111 (H) 02/08/2024 08:25 AM    BUN 27 (H) 02/08/2024 08:25 AM    CREATININE 1.07 02/08/2024  08:25 AM    BUNCREATRAT 20 03/21/2016 07:42 AM     Lab Results   Component Value Date/Time    ALKPHOSPHAT 69 02/08/2024 08:25 AM    ASTSGOT 17 02/08/2024 08:25 AM    ALTSGPT 13 02/08/2024 08:25 AM    TBILIRUBIN 0.4 02/08/2024 08:25 AM      Lab Results   Component Value Date/Time    BNPBTYPENAT 467 (H) 12/12/2013 09:35 PM               EKG interpreted by me: NSR    Impression/Plan:  1) pAF    Plan AF ablation    We discussed pulmonary vein isolation for therapeutic management and continued rhythm control.  We discussed the risks and benefits of this procedure.  Risks include 1-3% risk of major cardiovascular event including stroke, myocardial infarction, phrenic nerve damage, esophageal injury and/or fistula formation, cardiac perforation, pericardial effusion, tamponade, major bleeding, or death.  I quoted a 70 to 80% chance free of atrial fibrillation at 12 months.  We discussed that he may also need a second procedure.        Catrachito Ferrell MD  Cardiac Electrophysiology

## 2024-02-12 NOTE — DISCHARGE INSTRUCTIONS
HOME CARE INSTRUCTIONS    ACTIVITY: Rest and take it easy for the first 24 hours.  A responsible adult is recommended to remain with you during that time.  It is normal to feel sleepy.  We encourage you to not do anything that requires balance, judgment or coordination.    FOR 24 HOURS DO NOT:  Drive, operate machinery or run household appliances.  Drink beer or alcoholic beverages.  Make important decisions or sign legal documents.    SPECIAL INSTRUCTIONS: [facility] Post Ablation Patient Instructions:  No lifting > 10 lbs x 1 week.      No soaking in baths, hot tubs, pools x 1 week.  May shower the day after discharge and take off groin dressings and leave  sites uncovered.  Continue to monitor sites daily for warmth, redness, discolored drainage.  It is common to have a small lump in the area where the cather was (usually the size of a marble); this will go away but takes approximately 6 weeks to normalize.     3.   Please take all medications as prescribed to you; please do not stop any medications prescribed post ablation unless directed by your healthcare provider.      4.   Please do not miss any doses of your blood thinner (if you have been started on, or take chronic blood thinners) without discussion with your healthcare provider first.     5.   Please walk and take deep breaths after discharge.  After discharge, if you experience neurological changes/signs of stroke or high fever you should be seen in the emergency dept.     6.   It is possible you may experience some chest discomfort or chest tightness post ablation.  This is usually secondary to inflammation and irritation of the tissues at the area of the ablation.  If this occurs, it is advised to try 400 mg of Ibuprofen with food as needed up to three times a day for a maximum of two days.  This should help to decrease pain and tissue inflammation.          **Please notify the office ([phone]) if this occurs.          ** DO NOT TAKE Ibuprofen IF HISTORY OF ALLERGY, SIGNIFICANT BLEEDING OR KIDNEY DISEASE WITHOUT DISCUSSING WITH YOUR CARDIOLOGY PROVIDER FIRST.          ** If pain becomes severe or you have additional symptoms you may need to be medically evaluated; please contact the cardiology office ([phone]) for further direction.     7. It is possible that you may experience arrhythmia/Atrial Fibrillation post ablation.  This is secondary to irritation and inflammation of the cardiac tissues from the ablation.  If you have atrial fibrillation all day or feel poorly with it, please notify your cardiologist's via phone ([phone]) or [employer].      8.  Please contact call our office ([phone]) or message via [employer] message if you have any questions or concerns post procedurally.    9. You need to be seen for post ablation follow up 3-4 weeks post procedure. An appointment is scheduled for you.  Please contact the office ([phone]) if you need to change your appointment.      DIET: To avoid nausea, slowly advance diet as tolerated, avoiding spicy or greasy foods for the first day.  Add more substantial food to your diet according to your physician's instructions.  Babies can be fed formula or breast milk as soon as they are hungry.  INCREASE FLUIDS AND FIBER TO AVOID CONSTIPATION.    MEDICATIONS: Resume taking daily medication.  Take prescribed pain medication with food.  If no medication is prescribed, you may take non-aspirin pain medication if needed.  PAIN MEDICATION CAN BE VERY CONSTIPATING.  Take a stool softener or laxative such as senokot, pericolace, or milk of magnesia if needed.    A follow-up appointment should be arranged with your doctor; call to schedule.    You should CALL YOUR PHYSICIAN if you develop:  Fever greater than 101 degrees F.  Pain not relieved by medication, or persistent nausea or vomiting.  Excessive bleeding (blood soaking through dressing) or unexpected drainage from the  wound.  Extreme redness or swelling around the incision site, drainage of pus or foul smelling drainage.  Inability to urinate or empty your bladder within 8 hours.  Problems with breathing or chest pain.    You should call 911 if you develop problems with breathing or chest pain.  If you are unable to contact your doctor or surgical center, you should go to the nearest emergency room or urgent care center.  Physician's telephone #: [phone]    MILD FLU-LIKE SYMPTOMS ARE NORMAL.  YOU MAY EXPERIENCE GENERALIZED MUSCLE ACHES, THROAT IRRITATION, HEADACHE AND/OR SOME NAUSEA.    If any questions arise, call your doctor.  If your doctor is not available, please feel free to call the Surgical Center at [phone].  The Center is open Monday through Friday from 7AM to 7PM.      A registered nurse may call you a few days after your surgery to see how you are doing after your procedure.    You may also receive a survey in the mail within the next two weeks and we ask that you take a few moments to complete the survey and return it to us.  Our goal is to provide you with very good care and we value your comments.     Depression / Suicide Risk    As you are discharged from this [facility] facility, it is important to learn how to keep safe from harming yourself.    Recognize the warning signs:  Abrupt changes in personality, positive or negative- including increase in energy   Giving away possessions  Change in eating patterns- significant weight changes-  positive or negative  Change in sleeping patterns- unable to sleep or sleeping all the time   Unwillingness or inability to communicate  Depression  Unusual sadness, discouragement and loneliness  Talk of wanting to die  Neglect of personal appearance   Rebelliousness- reckless behavior  Withdrawal from people/activities they love  Confusion- inability to concentrate     If you or a loved one observes any of these behaviors or has concerns about self-harm, here's  what you can do:  Talk about it- your feelings and reasons for harming yourself  Remove any means that you might use to hurt yourself (examples: pills, rope, extension cords, firearm)  Get professional help from the community (Mental Health, Substance Abuse, psychological counseling)  Do not be alone:Call your Safe Contact- someone whom you trust who will be there for you.  Call your local CRISIS HOTLINE 962-7525 or 706-369-7559  Call your local Children's Mobile Crisis Response Team [place] (887) 652-7365 or www.[employer]  Call the toll free National Suicide Prevention Hotlines   National Suicide Prevention Lifeline 906-242-WAQM (3821)  National Hope Line Network 800-SUICIDE (721-0173)    I acknowledge receipt and understanding of these Home Care instructions.

## 2024-02-12 NOTE — ANESTHESIA TIME REPORT
Anesthesia Start and Stop Event Times       Date Time Event    2/12/2024 0726 Ready for Procedure     0745 Anesthesia Start     0926 Anesthesia Stop          Responsible Staff  02/12/24      Name Role Begin End    Leander Clemons D.O. Anesth 0745 [id number]          Overtime Reason:  no overtime (within assigned shift)    Comments:

## 2024-02-12 NOTE — ANESTHESIA POSTPROCEDURE EVALUATION
Patient: Hugo Gomez    Procedure Summary       Date: 02/12/24 Room / Location: [facility]    Anesthesia Start: 0745 Anesthesia Stop: 0926    Procedure: CL-EP ABLATION ATRIAL FIBRILLATION Diagnosis:       PAF (paroxysmal atrial fibrillation) (HCC)      (See Associated Dx)    Scheduled Providers: Catrachito Ferrell M.D.; Leander Clemons D.O. Responsible Provider: Leander Clemons D.O.    Anesthesia Type: general ASA Status: 3            Final Anesthesia Type: general  Last vitals  BP   Blood Pressure : 116/62    Temp   36.3 °C (97.4 °F)    Pulse   (!) 53   Resp   (!) 10    SpO2   91 %      Anesthesia Post Evaluation    Patient location during evaluation: PACU  Patient participation: complete - patient participated  Level of consciousness: awake and alert  Pain score: 0    Airway patency: patent  Anesthetic complications: no  Cardiovascular status: hemodynamically stable  Respiratory status: acceptable  Hydration status: euvolemic    PONV: none          No notable events documented.     Nurse Pain Score: 0 (NPRS)

## 2024-02-12 NOTE — OR NURSING
0928: Patient arrives to PPU flat, on gurney, resting comfortably. Right groin site CDI and soft. Attached to monitors and audible alarms. Patient educated on bed rest precautions, denies pain, denies nausea. Call light within reach.     1040: AIMEE Bobby bedside.    1047: Updated patient's wife via phone.     1300: Patient's wife bedside. Patient ambulated to bathroom without difficulty, right groin site CDI and soft.    1315: AIMEE Bobby bedside, opportunity for questions provided.     1317: Discharge education completed with patient and wife, opportunity for questions provided, paper copy given.     1330: PIV discontinued per policy, patient dressed self without assistance, states all personal belongings have been returned.     1335: Patient discharged via wheelchair to responsible adult.

## 2024-02-12 NOTE — OP REPORT
Electrophysiology Procedure Note  [facility]    Procedures Performed:  Pulmonary Vein Isolation  Ablation of additional arrhythmia  Intracardiac Echocardiography  Three-dimensional intracardiac mapping  IV isoproterenol infusion with programmed stimulation  Trans-esophageal echocardiogram    Electrophysiologist: Catrachito Ferrell MD    Assistant(s): None    Anesthesia: General anesthesia was provided by Dr. Clemons of the Anesthesiology service.    Statement of Medical Necessity: This is a 74  year-old male with history of symptomatic paroxysmal atrial  fibrillation     Pre-procedure ECG: Sinus     Post-procedure ECG: Sinus     Description of Procedure:    Access and catheter placement: After obtaining informed written consent, the patient was  brought to the EP lab in the fasting, non-sedated stated. The patient was sedated and intubated  by the anesthesiologist. The patient was prepped and draped in the usual sterile fashion. Using  the modified Seldinger technique, access was obtained in the right  femoral vein. Guidewires were advanced into the IVC. In the right femoral vein, 3 sheaths of   8F were placed. A deflectable  decapolar catheter was advanced through the LFV to the coronary sinus. A 8F intracardiac  echo catheter was advanced to the right atrium. Through an 8F sheath,  a long wire was advanced to the SVC. The short sheath was  changed out for a medium-curl Vizigo (Biosense Restrepo) sheath which was advanced to the SVC. The wire was  removed and the dilator was flushed. A 98-cm transseptal needle ([employer]) was advanced to the tip of the  dilator. The transseptal needle was attached to the manifold and  flushed. Under intracardiac echocardiographic guidance, the sheath/needle   system was withdrawn to the fossa ovalis. At this time, 11,000 units of heparin were administered.   Additional boluses of heparin were given as needed to keep -350 sec during LA dwell time.   The needle was  advanced out of the dilator, and RF energy applied via the [place] needle.   ICE visualized the needle passage through the fossa ovalis into the left  atrium. Confirmation of left atrial location was confirmed by injecting saline and transducing  pressure. The dilator was advanced over the needle into the left atrium, and then the sheath was advanced over the dilator. The dilator and  needle were removed. The sheath was flushed and connected to a continuous heparinized   saline drip.       A duodecapolar Penta-Ray catheter ([employer]-Restrepo) was  advanced through the Vizigo sheath into the left atrium. A 3-dimensional electroanatomical  map was constructed using the CARTO system. The Penta-ray was removed and a 3.5-mm externally-irrigated ablation catheter (Biosense-Restrepo   Thermocool ST SF DF-curve) was advanced through the Vizigo sheath into the left atrium.     CTI block was confirmed present >170ms at the start of the case. At baseline atrial tachycardia was easily inducible TCL 270ms. We mapped this in both chambers. This was mapped consistent with focal tachycardia from the posterior aspect of the CS os. Ablation here slowed and terminated the tachycardia.    Attention was then turned to the left pulmonary veins. The Penta-Ray  catheter was placed in the LSPV and LIPV which showed conduction into the vein. A circumferential  ablation line using radiofrequency energy 20-40W was placed which resulted in LSPV and LIPV isolation.  The catheters were then moved to the RSPV and RIPV which showed evidence of conduction into the veins,   and ablation 20-40W was performed circumferentially around these veins   resulting in RSPV and RIPV isolation. Power was reduced near the esophagus.  The Penta-Ray was advanced into all four pulmonary veins and persistent conduction block into the right and left pulmonary veins was demonstrated.       Isuprel 2-4mcg/min was administered and burst pacing was performed in the left  atrium without induction of sustained AF, AFL, or SVT.     The left atrial sheath and catheter were withdrawn to the right atrium.   ICE visualization   of the pericardium confirmed no pericardial effusion at the end of the case. The  patient was awakened from anesthesia, catheters and sheaths were removed, Vascade MVP closure devices were deployed, and manual  pressure was held on bilateral groins until hemostasis was achieved.     Electrophysiological Findings:  Sinus cycle length 1010 msec  Intervals- A-H 50 msec  H-V 56 msec  QRS 95 msec   msec   msec  AV block  ms  AVERP: 500/310 ms    Total RF time: 944 sec  Fluoro time: 0 min    Estimated blood loss - 30 mL    Complications: None    Impression:  1. Atrial fibrillation, paroxysmal  2. Successful isolation of all four pulmonary veins  3. Successful focal atrial tachycardia ablation from CS ostium  4. Baseline CTI block    Recommendations:  1. Bed rest for 2 hours  2. Telemetry monitoring during recovery  3. Anticoagulant therapy for at least 6-12 months  4. Follow up in Arrhythmia clinic in 4 weeks      Catrachito Ferrell MD  Cardiac Electrophysiology

## 2024-02-12 NOTE — ANESTHESIA PROCEDURE NOTES
Airway    Date/Time: 2/12/2024 7:56 AM    Performed by: Leander Clemons D.O.  Authorized by: Leander Clemons D.O.    Location:  OR  Urgency:  Elective  Indications for Airway Management:  Anesthesia      Spontaneous Ventilation: absent    Sedation Level:  Deep  Preoxygenated: Yes    Patient Position:  Sniffing  Mask Difficulty Assessment:  0 - not attempted  Final Airway Type:  Endotracheal airway  Final Endotracheal Airway:  ETT  Cuffed: Yes    Technique Used for Successful ETT Placement:  Direct laryngoscopy    Insertion Site:  Oral  Blade Type:  Ed  Laryngoscope Blade/Videolaryngoscope Blade Size:  4  ETT Size (mm):  7.5  Measured from:  Teeth  ETT to Teeth (cm):  24  Placement Verified by: auscultation and capnometry    Cormack-Lehane Classification:  Grade I - full view of glottis  Number of Attempts at Approach:  1

## 2024-02-14 ENCOUNTER — NON-PROVIDER VISIT (OUTPATIENT)
Dept: CARDIOLOGY | Facility: MEDICAL CENTER | Age: 74
End: 2024-02-14
Payer: COMMERCIAL

## 2024-02-14 PROCEDURE — 93298 REM INTERROG DEV EVAL SCRMS: CPT | Mod: 26 | Performed by: INTERNAL MEDICINE

## 2024-02-14 NOTE — CARDIAC REMOTE MONITOR - SCAN
Device transmission reviewed. Device demonstrated appropriate function.       Electronically Signed by: Catrachito Ferrell M.D.    2/20/2024  2:30 PM

## 2024-02-16 NOTE — PROGRESS NOTES
"Chief Complaint   Patient presents with    Atrial Flutter     F/v dx: Typical atrial flutter (HCC)         Subjective     Sebastian Gomez is a 73 y.o. male patient of Dr. Ferrell who presents today for follow up after undergoing AF/AFL ablation on 2/12/2024.    Patient was last seen by my self on 12/5/2023. He was not feeling well overall. His is ILR showed increasing AF/AFL burden (13% from 2.2%). He was scheduled for AF/AFL ablation.     Patient is also followed by Dr. Sukhjinder Stephenson.    Past medical history pertinent for AFL H/O ablation, pAF H/O PRESTON-C, ILR In Situ, ZHANE, HTN, CAD H/O CABG in 2013, HLD and asthma.     Today patient states that he is still not feeling well. He was hoping the ablation would have helped. Patient is suspicious that it could be due to an intolerance to Crestor. Primary complaint is fatigue. Also reports difficulty keeping weight on. He did have some chest pain post ablation, it is resolved now. Reports that his bilateral femoral access sites are well healed.     Past Medical History:   Diagnosis Date    Anesthesia     pt felt fentanyl gave him \"hangover for 3 days\"    Arrhythmia     pt unaware    ASTHMA     prn    Breath shortness     Bronchitis     Cataract 2022    IOL bilat    Coronary artery disease due to lipid rich plaque - CABG     High cholesterol     History of myocardial infarction     unaware of at the time    Implantable loop recorder present 04/04/2023    Pain 09/16/2022    intermittent in chest with exercise    Pneumonia     Postoperative atrial fibrillation (HCC) 2013 in setting of CABG     Primary hypertension     S/P coronary artery bypass graft x 5     Sleep apnea     no cpap    Snoring     Typical atrial flutter (HCC) sen 9/16/2022      Past Surgical History:   Procedure Laterality Date    CATARACT EXTRACTION WITH IOL Bilateral 2022    FISTULA IN ANO REPAIR  2018    MULTIPLE CORONARY ARTERY BYPASS ENDO VEIN HARVEST  12/07/2013    Performed by Mo Vincent, " M.D. at SURGERY [facility]    CHOLECYSTECTOMY  1996    OTHER CARDIAC SURGERY      watchman    OTHER CARDIAC SURGERY      Loop Recorder     Family History   Problem Relation Age of Onset    Diabetes Mother     Stroke Mother     Cancer Father         None    Heart Disease Neg Hx      Social History     Socioeconomic History    Marital status:      Spouse name: Kaylee    Number of children: Not on file    Years of education: Not on file    Highest education level: Bachelor's degree (e.g., BA, AB, BS)   Occupational History     Employer: [employer]   Tobacco Use    Smoking status: Every Day     Current packs/day: 0.30     Average packs/day: 0.4 packs/day for 77.3 years (27.5 ttl pk-yrs)     Types: Cigarettes     Start date: 9/1/1968     Passive exposure: Past    Smokeless tobacco: Never    Tobacco comments:     I've quit many times over the years but I always start again - smokes half pack daily   Vaping Use    Vaping Use: Never used   Substance and Sexual Activity    Alcohol use: Yes     Alcohol/week: 2.4 oz     Types: 4 Cans of beer per week     Comment: I usually will only drink once a week and it’s usually 3    Drug use: No    Sexual activity: Not on file   Other Topics Concern    Not on file   Social History Narrative    Not on file     Social Determinants of Health     Financial Resource Strain: Low Risk  (4/19/2023)    Overall Financial Resource Strain (CARDIA)     Difficulty of Paying Living Expenses: Not hard at all   Food Insecurity: No Food Insecurity (4/19/2023)    Hunger Vital Sign     Worried About Running Out of Food in the Last Year: Never true     Ran Out of Food in the Last Year: Never true   Transportation Needs: No Transportation Needs (4/19/2023)    PRAPARE - Transportation     Lack of Transportation (Medical): No     Lack of Transportation (Non-Medical): No   Physical Activity: Sufficiently Active (4/19/2023)    Exercise Vital Sign     Days of Exercise per Week: 6 days     Minutes of  Exercise per Session: 60 min   Stress: Stress Concern Present (4/19/2023)    [employer] [place] [employer] - Occupational Stress Questionnaire     Feeling of Stress : To some extent   Social Connections: Unknown (4/19/2023)    Social Connection and Isolation Panel [NHANES]     Frequency of Communication with Friends and Family: Twice a week     Frequency of Social Gatherings with Friends and Family: Patient declined     Attends Judaism Services: 1 to 4 times per year     Active Member of Clubs or Organizations: No     Attends Club or Organization Meetings: Never     Marital Status:    Intimate Partner Violence: Not on file   Housing Stability: Low Risk  (4/19/2023)    Housing Stability Vital Sign     Unable to Pay for Housing in the Last Year: No     Number of Places Lived in the Last Year: 1     Unstable Housing in the Last Year: No     Allergies   Allergen Reactions    Atorvastatin     Cat Hair Extract Runny Nose and Itching     .    Xarelto [Rivaroxaban]      nausea     Outpatient Encounter Medications as of 2/27/2024   Medication Sig Dispense Refill    Evolocumab (REPATHA) 140 MG/ML Solution Auto-injector SubQ injection pen Inject 1 mL under the skin every 14 days. 2.1 Each 10    omeprazole (PRILOSEC) 20 MG delayed-release capsule Take 1 Capsule by mouth every morning before breakfast. Please take for 30 days post ablation 30 Capsule 0    rivaroxaban (XARELTO) 20 MG Tab tablet Take 1 Tablet by mouth with dinner. 30 Tablet 1    rosuvastatin (CRESTOR) 10 MG Tab Take 1 tablet by mouth once daily 90 Tablet 3    ezetimibe (ZETIA) 10 MG Tab Take 1 Tablet by mouth every day. 90 Tablet 3    aspirin (ASA) 81 MG Chew Tab chewable tablet Chew 1 Tablet every day. 100 Tablet 3    HYDROcodone/acetaminophen (NORCO)  MG Tab Take 1 Tablet by mouth every 6 hours as needed for Severe Pain.       No facility-administered encounter medications on file as of 2/27/2024.     Review of Systems   Constitutional:   Positive for malaise/fatigue and weight loss.   Respiratory:  Negative for shortness of breath.    Cardiovascular:  Negative for chest pain, palpitations, orthopnea, claudication, leg swelling and PND.   Neurological:  Negative for dizziness and weakness.   All other systems reviewed and are negative.             Objective     /60 (BP Location: Left arm, Patient Position: Sitting, BP Cuff Size: Adult)   Pulse (!) 51   Resp 16   Ht 1.829 m (6')   Wt 78.5 kg (173 lb)   SpO2 97%   BMI 23.46 kg/m²     Physical Exam  Constitutional:       General: He is not in acute distress.     Appearance: He is well-developed.   HENT:      Head: Normocephalic.   Eyes:      Extraocular Movements: Extraocular movements intact.   Neck:      Vascular: No carotid bruit or JVD.   Cardiovascular:      Rate and Rhythm: Normal rate and regular rhythm.   Pulmonary:      Effort: Pulmonary effort is normal.      Breath sounds: Normal breath sounds.   Abdominal:      General: There is no distension.   Musculoskeletal:      Cervical back: Normal range of motion.      Right lower leg: No edema.      Left lower leg: No edema.   Skin:     General: Skin is warm and dry.   Neurological:      Mental Status: He is alert and oriented to person, place, and time.   Psychiatric:         Mood and Affect: Mood normal.         Behavior: Behavior normal.            I. CORONARY ANGIOGRAPHY:  Left main coronary artery: Large caliber vessel giving rise to the LAD and circumflex.  No CAD.  Left anterior descending artery: Large caliber vessel which is subtotally occluded in its proximal midportion.  Fills via 1 patent LIMA to LAD to a large distal vessel.  Left circumflex coronary artery: Large-caliber codominant vessel.  There is 50% proximal stenosis.  OM1/ramus is subtotally occluded and fills via retrograde flow from the grafted segment which is widely patent.  OM 2 is chronic totally occluded.  It fills via a patent skip segment from the OM1 graft.   Circumflex itself continues in the AV groove and supplies a small posterior descending vessel and is free from high-grade disease.  Right coronary artery: Chronic totally occluded.  Fills via widely patent vein graft to the distal vessel.  LIMA to LAD: Widely patent  VG to RCA: Widely patent  VG to DX-OM1-OM2: Occluded diagonal segment with widely patent flow to the OM1 and OM 2 and retrograde filling of the OM1 into its large parent branch in continuity with the circumflex.      Assessment & Plan     1. Typical atrial flutter (HCC) seen 9/16/2022  EC-ECHOCARDIOGRAM COMPLETE W/O CONT    CANCELED: EKG      2. S/P coronary artery bypass graft x 5  EC-ECHOCARDIOGRAM COMPLETE W/O CONT    Evolocumab (REPATHA) 140 MG/ML Solution Auto-injector SubQ injection pen    Comp Metabolic Panel    Lipid Profile      3. Coronary artery disease due to lipid rich plaque  Evolocumab (REPATHA) 140 MG/ML Solution Auto-injector SubQ injection pen    Comp Metabolic Panel    Lipid Profile      4. Mixed hyperlipidemia  Evolocumab (REPATHA) 140 MG/ML Solution Auto-injector SubQ injection pen    Comp Metabolic Panel    Lipid Profile      5. History of cardiac catheterization        6. H/O cardiac radiofrequency ablation. 12/7/22. Dr Ferrell         7. S/P ablation of atrial fibrillation        8. Hypercoagulable state due to paroxysmal atrial fibrillation (HCC)        9. Anticoagulated        10. Presence of Watchman left atrial appendage closure device              Medical Decision Making: Today's Assessment/Status/Plan:   pAF/AFL:  - H/O AFL (RA flutter with bidirectional isthmus block) in 2022.  - S/P PVI, AT (OS ostium) and CTI block with Dr. Ferrell on 2/12/2024.   - H/O PRESTON-C via WATCHMAN 4/19/2023. Continue OAC with Xarelto for 2 months post ablation. Then stop OAC and continue ASA 81 mg daily.   - ILR showing pAF/AFL (5.3%). Discussed that we should see decreasing burden as he heals. CTM AF/AFL burden via ILR (implanted inDecember 2022).      SOB/Fatigue/Weight loss:  - ?statin intolerance, similar symptoms with Lipitor.   - Stop Crestor and Zetia. Start Repatha 140 mg every 14 days.   - Encouraged patient to discuss weight loss with his PCP.     CAD/HLD:  - H/O CABG in 2013.  - LHC in 2022 showed patent VG to OM1 skip OM2 but occluded timy DX segment with otherwise patent grafts.  to RCA.   - Denies anginal symptoms.   - LDL goal is <70, currently 76. Unable to up titrate Crestor due to intolerance. Try Repatha as above. Repeat lipid panel in 3 months.     Tobacco Cessation:  - Patient is not currently interested in smoking cessation. We did discuss low dose CT annual screening for lung cancer. Patient is interested and will discuss with his PCP.          Patient will follow up as arranged below or earlier if needed. Encouraged patient to contact our office should any questions or concerns arise in the mean time.       Future Appointments   Date Time Provider Department Center   4/8/2024  8:15 AM [facility] EXAM 12 ECHO [facility]   4/10/2024  3:40 PM Catrachito Ferrell M.D. [facility] None   6/4/2024  8:15 AM [facility] None

## 2024-02-27 ENCOUNTER — OFFICE VISIT (OUTPATIENT)
Dept: CARDIOLOGY | Facility: MEDICAL CENTER | Age: 74
End: 2024-02-27
Attending: NURSE PRACTITIONER
Payer: COMMERCIAL

## 2024-02-27 ENCOUNTER — TELEPHONE (OUTPATIENT)
Dept: CARDIOLOGY | Facility: MEDICAL CENTER | Age: 74
End: 2024-02-27

## 2024-02-27 VITALS
RESPIRATION RATE: 16 BRPM | DIASTOLIC BLOOD PRESSURE: 60 MMHG | HEART RATE: 51 BPM | WEIGHT: 173 LBS | HEIGHT: 72 IN | OXYGEN SATURATION: 97 % | BODY MASS INDEX: 23.43 KG/M2 | SYSTOLIC BLOOD PRESSURE: 122 MMHG

## 2024-02-27 DIAGNOSIS — D68.69 HYPERCOAGULABLE STATE DUE TO PAROXYSMAL ATRIAL FIBRILLATION (HCC): ICD-10-CM

## 2024-02-27 DIAGNOSIS — Z98.890 HISTORY OF CARDIAC CATHETERIZATION: Chronic | ICD-10-CM

## 2024-02-27 DIAGNOSIS — Z98.890 S/P ABLATION OF ATRIAL FIBRILLATION: ICD-10-CM

## 2024-02-27 DIAGNOSIS — Z79.01 ANTICOAGULATED: ICD-10-CM

## 2024-02-27 DIAGNOSIS — Z95.1 S/P CORONARY ARTERY BYPASS GRAFT X 5: ICD-10-CM

## 2024-02-27 DIAGNOSIS — E78.2 MIXED HYPERLIPIDEMIA: ICD-10-CM

## 2024-02-27 DIAGNOSIS — Z86.79 S/P ABLATION OF ATRIAL FIBRILLATION: ICD-10-CM

## 2024-02-27 DIAGNOSIS — I48.3 TYPICAL ATRIAL FLUTTER (HCC): Chronic | ICD-10-CM

## 2024-02-27 DIAGNOSIS — I25.83 CORONARY ARTERY DISEASE DUE TO LIPID RICH PLAQUE: ICD-10-CM

## 2024-02-27 DIAGNOSIS — Z98.890 H/O CARDIAC RADIOFREQUENCY ABLATION: ICD-10-CM

## 2024-02-27 DIAGNOSIS — Z95.818 PRESENCE OF WATCHMAN LEFT ATRIAL APPENDAGE CLOSURE DEVICE: ICD-10-CM

## 2024-02-27 DIAGNOSIS — I25.10 CORONARY ARTERY DISEASE DUE TO LIPID RICH PLAQUE: ICD-10-CM

## 2024-02-27 DIAGNOSIS — I48.0 HYPERCOAGULABLE STATE DUE TO PAROXYSMAL ATRIAL FIBRILLATION (HCC): ICD-10-CM

## 2024-02-27 PROCEDURE — 3074F SYST BP LT 130 MM HG: CPT | Performed by: NURSE PRACTITIONER

## 2024-02-27 PROCEDURE — 99212 OFFICE O/P EST SF 10 MIN: CPT | Performed by: NURSE PRACTITIONER

## 2024-02-27 PROCEDURE — 3078F DIAST BP <80 MM HG: CPT | Performed by: NURSE PRACTITIONER

## 2024-02-27 PROCEDURE — 99214 OFFICE O/P EST MOD 30 MIN: CPT | Performed by: NURSE PRACTITIONER

## 2024-02-27 ASSESSMENT — ENCOUNTER SYMPTOMS
CLAUDICATION: 0
WEIGHT LOSS: 1
SHORTNESS OF BREATH: 0
DIZZINESS: 0
ORTHOPNEA: 0
WEAKNESS: 0
PALPITATIONS: 0
PND: 0

## 2024-02-27 ASSESSMENT — FIBROSIS 4 INDEX: FIB4 SCORE: 1.4

## 2024-02-27 NOTE — TELEPHONE ENCOUNTER
Prior Authorization for Repatha SureClick 140MG/ML auto-injectors (Quantity: 6, Days: 84) has been submitted via Cover My Meds: Key ([id number])    Insurance: BLUE CROSS     Will follow up in 24-48 business hours.

## 2024-03-16 ENCOUNTER — NON-PROVIDER VISIT (OUTPATIENT)
Dept: CARDIOLOGY | Facility: MEDICAL CENTER | Age: 74
End: 2024-03-16
Payer: COMMERCIAL

## 2024-03-16 PROCEDURE — 93298 REM INTERROG DEV EVAL SCRMS: CPT | Mod: 26 | Performed by: INTERNAL MEDICINE

## 2024-03-18 NOTE — CARDIAC REMOTE MONITOR - SCAN
Device transmission reviewed. Device demonstrated appropriate function.       Electronically Signed by: Eli Tang M.D.    3/30/2024  1:49 PM

## 2024-03-20 ENCOUNTER — PHARMACY VISIT (OUTPATIENT)
Dept: PHARMACY | Facility: MEDICAL CENTER | Age: 74
End: 2024-03-20
Payer: COMMERCIAL

## 2024-03-20 PROCEDURE — RXMED WILLOW AMBULATORY MEDICATION CHARGE: Performed by: INTERNAL MEDICINE

## 2024-04-08 ENCOUNTER — HOSPITAL ENCOUNTER (OUTPATIENT)
Dept: CARDIOLOGY | Facility: MEDICAL CENTER | Age: 74
End: 2024-04-08
Attending: NURSE PRACTITIONER
Payer: COMMERCIAL

## 2024-04-08 DIAGNOSIS — Z95.1 S/P CORONARY ARTERY BYPASS GRAFT X 5: ICD-10-CM

## 2024-04-08 DIAGNOSIS — I48.3 TYPICAL ATRIAL FLUTTER (HCC): Chronic | ICD-10-CM

## 2024-04-08 LAB
LV EJECT FRACT  99904: 50
LV EJECT FRACT MOD 2C 99903: 58.82
LV EJECT FRACT MOD 4C 99902: 54.92
LV EJECT FRACT MOD BP 99901: 58.24

## 2024-04-08 PROCEDURE — 93306 TTE W/DOPPLER COMPLETE: CPT | Mod: 26 | Performed by: INTERNAL MEDICINE

## 2024-04-08 PROCEDURE — 93306 TTE W/DOPPLER COMPLETE: CPT

## 2024-04-10 ENCOUNTER — OFFICE VISIT (OUTPATIENT)
Dept: CARDIOLOGY | Facility: MEDICAL CENTER | Age: 74
End: 2024-04-10
Attending: INTERNAL MEDICINE
Payer: COMMERCIAL

## 2024-04-10 VITALS
BODY MASS INDEX: 23.16 KG/M2 | HEIGHT: 72 IN | OXYGEN SATURATION: 95 % | RESPIRATION RATE: 14 BRPM | HEART RATE: 65 BPM | DIASTOLIC BLOOD PRESSURE: 68 MMHG | WEIGHT: 171 LBS | SYSTOLIC BLOOD PRESSURE: 110 MMHG

## 2024-04-10 DIAGNOSIS — I48.3 TYPICAL ATRIAL FLUTTER (HCC): Chronic | ICD-10-CM

## 2024-04-10 PROCEDURE — 3078F DIAST BP <80 MM HG: CPT | Performed by: INTERNAL MEDICINE

## 2024-04-10 PROCEDURE — 99214 OFFICE O/P EST MOD 30 MIN: CPT | Performed by: INTERNAL MEDICINE

## 2024-04-10 PROCEDURE — 3074F SYST BP LT 130 MM HG: CPT | Performed by: INTERNAL MEDICINE

## 2024-04-10 PROCEDURE — 99212 OFFICE O/P EST SF 10 MIN: CPT | Performed by: INTERNAL MEDICINE

## 2024-04-10 PROCEDURE — 93005 ELECTROCARDIOGRAM TRACING: CPT | Performed by: INTERNAL MEDICINE

## 2024-04-10 ASSESSMENT — FIBROSIS 4 INDEX: FIB4 SCORE: 1.4

## 2024-04-10 NOTE — PROGRESS NOTES
"Arrhythmia Clinic Note (Established patient)    DOS: 4/10/2024    Chief complaint/Reason for consult: Afib    Interval History: 74-year-old man with a history of coronary disease status post CABG, atrial fibrillation and flutter status post ablation, loop recorder in situ, left atrial appendage closure.  He remains fatigued and short of breath.  His atrial arrhythmia burden is now only 0.2% and this is mostly artifact from his loop recorder.  His recent echocardiogram does show new evidence of pulmonary hypertension.    ROS (+ highlighted in bold):  Constitutional: Fevers/chills/fatigue/weightloss  HEENT: Blurry vision/eye pain/sore throat/hearing loss  Respiratory: Shortness of breath/cough  Cardiovascular: Chest pain/palpitations/edema/orthopnea/syncope  GI: Nausea/vomitting/diarrhea  MSK: Arthralgias/myagias/muscle weakness  Skin: Rash/sores  Neurological: Numbness/tremors/vertigo  Endocrine: Excessive thirst/polyuria/cold intolerance/heat intolerance  Psych: Depression/anxiety    Past Medical History:   Diagnosis Date    Anesthesia     pt felt fentanyl gave him \"hangover for 3 days\"    Arrhythmia     pt unaware    ASTHMA     prn    Breath shortness     Bronchitis     Cataract 2022    IOL bilat    Coronary artery disease due to lipid rich plaque - CABG     High cholesterol     History of myocardial infarction     unaware of at the time    Implantable loop recorder present 04/04/2023    Pain 09/16/2022    intermittent in chest with exercise    Pneumonia     Postoperative atrial fibrillation (HCC) 2013 in setting of CABG     Primary hypertension     S/P coronary artery bypass graft x 5     Sleep apnea     no cpap    Snoring     Typical atrial flutter (HCC) sen 9/16/2022        Past Surgical History:   Procedure Laterality Date    CATARACT EXTRACTION WITH IOL Bilateral 2022    FISTULA IN ANO REPAIR  2018    MULTIPLE CORONARY ARTERY BYPASS ENDO VEIN HARVEST  12/07/2013    Performed by Mo Vincent M.D. at SURGERY " [facility]    CHOLECYSTECTOMY  1996    OTHER CARDIAC SURGERY      watchman    OTHER CARDIAC SURGERY      Loop Recorder       Social History     Socioeconomic History    Marital status:      Spouse name: Kaylee    Number of children: Not on file    Years of education: Not on file    Highest education level: Bachelor's degree (e.g., BA, AB, BS)   Occupational History     Employer: [employer]   Tobacco Use    Smoking status: Every Day     Current packs/day: 0.30     Average packs/day: 0.4 packs/day for 77.4 years (27.6 ttl pk-yrs)     Types: Cigarettes     Start date: 9/1/1968     Passive exposure: Past    Smokeless tobacco: Never    Tobacco comments:     I've quit many times over the years but I always start again - smokes half pack daily   Vaping Use    Vaping Use: Never used   Substance and Sexual Activity    Alcohol use: Yes     Alcohol/week: 2.4 oz     Types: 4 Cans of beer per week     Comment: I usually will only drink once a week and it’s usually 3    Drug use: No    Sexual activity: Not on file   Other Topics Concern    Not on file   Social History Narrative    Not on file     Social Determinants of Health     Financial Resource Strain: Low Risk  (4/19/2023)    Overall Financial Resource Strain (CARDIA)     Difficulty of Paying Living Expenses: Not hard at all   Food Insecurity: No Food Insecurity (4/19/2023)    Hunger Vital Sign     Worried About Running Out of Food in the Last Year: Never true     Ran Out of Food in the Last Year: Never true   Transportation Needs: No Transportation Needs (4/19/2023)    PRAPARE - Transportation     Lack of Transportation (Medical): No     Lack of Transportation (Non-Medical): No   Physical Activity: Sufficiently Active (4/19/2023)    Exercise Vital Sign     Days of Exercise per Week: 6 days     Minutes of Exercise per Session: 60 min   Stress: Stress Concern Present (4/19/2023)    [place] [employer] - Occupational Stress Questionnaire      Feeling of Stress : To some extent   Social Connections: Unknown (4/19/2023)    Social Connection and Isolation Panel [NHANES]     Frequency of Communication with Friends and Family: Twice a week     Frequency of Social Gatherings with Friends and Family: Patient declined     Attends Adventism Services: 1 to 4 times per year     Active Member of Clubs or Organizations: No     Attends Club or Organization Meetings: Never     Marital Status:    Intimate Partner Violence: Not on file   Housing Stability: Low Risk  (4/19/2023)    Housing Stability Vital Sign     Unable to Pay for Housing in the Last Year: No     Number of Places Lived in the Last Year: 1     Unstable Housing in the Last Year: No       Family History   Problem Relation Age of Onset    Diabetes Mother     Stroke Mother     Cancer Father         None    Heart Disease Neg Hx        Allergies   Allergen Reactions    Atorvastatin     Cat Hair Extract Runny Nose and Itching     .    Xarelto [Rivaroxaban]      nausea       Current Outpatient Medications   Medication Sig Dispense Refill    rivaroxaban (XARELTO) 20 MG Tab tablet Take 1 Tablet by mouth with dinner. 30 Tablet 1    rosuvastatin (CRESTOR) 10 MG Tab Take 1 tablet by mouth once daily 90 Tablet 3    ezetimibe (ZETIA) 10 MG Tab Take 1 Tablet by mouth every day. 90 Tablet 3    aspirin (ASA) 81 MG Chew Tab chewable tablet Chew 1 Tablet every day. 100 Tablet 3    HYDROcodone/acetaminophen (NORCO)  MG Tab Take 1 Tablet by mouth every 6 hours as needed for Severe Pain.      Evolocumab (REPATHA) 140 MG/ML Solution Auto-injector SubQ injection pen Inject 1 mL under the skin every 14 days. 2.1 Each 10    omeprazole (PRILOSEC) 20 MG delayed-release capsule Take 1 Capsule by mouth every morning before breakfast. Please take for 30 days post ablation 30 Capsule 0     No current facility-administered medications for this visit.       Physical Exam:  Vitals:    04/10/24 1528   BP: 110/68   BP Location:  "Left arm   Patient Position: Sitting   BP Cuff Size: Adult   Pulse: 65   Resp: 14   SpO2: 95%   Weight: 77.6 kg (171 lb)   Height: 1.829 m (6')     General appearance: NAD, conversant   Eyes: anicteric sclerae, moist conjunctivae; no lid-lag; PERRLA  HENT: Atraumatic; oropharynx clear with moist mucous membranes and no mucosal ulcerations; normal hard and soft palate  Neck: Trachea midline; FROM, supple, no thyromegaly or lymphadenopathy  Lungs: CTA, with normal respiratory effort and no intercostal retractions  CV: RRR, no MRGs, no JVD  Abdomen: Soft, non-tender; no masses or HSM  Extremities: No peripheral edema or extremity lymphadenopathy  Skin: Normal temperature, turgor and texture; no rash, ulcers or subcutaneous nodules  Psych: Appropriate affect, alert and oriented to person, place and time    Data:  Lipids:   Lab Results   Component Value Date/Time    CHOLSTRLTOT 146 10/09/2023 06:35 AM    TRIGLYCERIDE 69 10/09/2023 06:35 AM    HDL 56 10/09/2023 06:35 AM    LDL 76 10/09/2023 06:35 AM        BMP:  Lab Results   Component Value Date/Time    SODIUM 137 02/08/2024 0825    POTASSIUM 4.8 02/08/2024 0825    CHLORIDE 101 02/08/2024 0825    CO2 25 02/08/2024 0825    GLUCOSE 111 (H) 02/08/2024 0825    BUN 27 (H) 02/08/2024 0825    CREATININE 1.07 02/08/2024 0825    CALCIUM 9.4 02/08/2024 0825    ANION 11.0 02/08/2024 0825        TSH:   Lab Results   Component Value Date/Time    TSHULTRASEN 1.050 11/10/2020 1439        THYROXINE (T4):   No results found for: \"FREEDIR\"     CBC:   Lab Results   Component Value Date/Time    WBC 11.3 (H) 02/08/2024 08:25 AM    RBC 4.92 02/08/2024 08:25 AM    HEMOGLOBIN 15.5 02/08/2024 08:25 AM    HEMATOCRIT 46.4 02/08/2024 08:25 AM    MCV 94.3 02/08/2024 08:25 AM    MCH 31.5 02/08/2024 08:25 AM    MCHC 33.4 02/08/2024 08:25 AM    RDW 51.2 (H) 02/08/2024 08:25 AM    PLATELETCT 246 02/08/2024 08:25 AM    MPV 9.2 02/08/2024 08:25 AM    NEUTSPOLYS 65.50 02/08/2024 08:25 AM    LYMPHOCYTES " 22.40 02/08/2024 08:25 AM    MONOCYTES 9.20 02/08/2024 08:25 AM    EOSINOPHILS 2.00 02/08/2024 08:25 AM    BASOPHILS 0.50 02/08/2024 08:25 AM    IMMGRAN 0.40 02/08/2024 08:25 AM    NRBC 0.00 02/08/2024 08:25 AM    NEUTS 7.40 02/08/2024 08:25 AM    LYMPHS 2.54 02/08/2024 08:25 AM    MONOS 1.04 (H) 02/08/2024 08:25 AM    EOS 0.23 02/08/2024 08:25 AM    BASO 0.06 02/08/2024 08:25 AM    IMMGRANAB 0.05 02/08/2024 08:25 AM    NRBCAB 0.00 02/08/2024 08:25 AM        CBC w/o DIFF  Lab Results   Component Value Date/Time    WBC 11.3 (H) 02/08/2024 08:25 AM    RBC 4.92 02/08/2024 08:25 AM    HEMOGLOBIN 15.5 02/08/2024 08:25 AM    MCV 94.3 02/08/2024 08:25 AM    MCH 31.5 02/08/2024 08:25 AM    MCHC 33.4 02/08/2024 08:25 AM    RDW 51.2 (H) 02/08/2024 08:25 AM    MPV 9.2 02/08/2024 08:25 AM       Prior echo/stress reviewed: Ejection fraction 50%, dilated RV with reduced systolic function, and pulmonary hypertension    Loop recorder interrogation shows 0.2% burden AT/AF, my review of these is that most represent artifact    EKG interpreted by me: Sinus rhythm    Impression/Plan:  1. Typical atrial flutter (HCC) seen 9/16/2022  EKG        1.  Atrial flutter status post ablation  2.  Atrial tachycardia status post ablation  3.  Paroxysmal atrial fibrillation status post ablation  4.  Pulmonary hypertension    -No significant recurrence of arrhythmias recently.  [place] decreased now from 13% to less than 1%.  -He can stop Xarelto, and has been 2 months since ablation and he has a watchman in place, continue aspirin 81 mg  -He will follow-up with pulmonary regarding his new diagnosis of pulmonary hypertension, potentially the cause of his symptoms    Follow-up with electrophysiology in 6 months    Catrachito Ferrell MD  Cardiac Electrophysiology

## 2024-04-16 ENCOUNTER — NON-PROVIDER VISIT (OUTPATIENT)
Dept: CARDIOLOGY | Facility: MEDICAL CENTER | Age: 74
End: 2024-04-16
Payer: COMMERCIAL

## 2024-04-25 LAB — EKG IMPRESSION: NORMAL

## 2024-05-05 ENCOUNTER — HOSPITAL ENCOUNTER (EMERGENCY)
Facility: MEDICAL CENTER | Age: 74
End: 2024-05-05
Attending: EMERGENCY MEDICINE
Payer: COMMERCIAL

## 2024-05-05 ENCOUNTER — APPOINTMENT (OUTPATIENT)
Dept: RADIOLOGY | Facility: MEDICAL CENTER | Age: 74
End: 2024-05-05
Attending: EMERGENCY MEDICINE
Payer: COMMERCIAL

## 2024-05-05 VITALS
SYSTOLIC BLOOD PRESSURE: 144 MMHG | OXYGEN SATURATION: 98 % | HEART RATE: 74 BPM | TEMPERATURE: 97.5 F | DIASTOLIC BLOOD PRESSURE: 89 MMHG | HEIGHT: 72 IN | RESPIRATION RATE: 18 BRPM | BODY MASS INDEX: 23.62 KG/M2 | WEIGHT: 174.38 LBS

## 2024-05-05 DIAGNOSIS — S42.017A CLOSED NONDISPLACED FRACTURE OF STERNAL END OF RIGHT CLAVICLE, INITIAL ENCOUNTER: ICD-10-CM
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
## 2024-05-05 LAB — EKG IMPRESSION: NORMAL

## 2024-05-05 RX ORDER — ASPIRIN 81 MG/1
81 TABLET ORAL DAILY
COMMUNITY

## 2024-05-05 RX ORDER — IBUPROFEN 200 MG
200 TABLET ORAL EVERY 6 HOURS PRN
COMMUNITY

## 2024-05-05 RX ORDER — OXYCODONE AND ACETAMINOPHEN 10; 325 MG/1; MG/1
1 TABLET ORAL EVERY 6 HOURS PRN
COMMUNITY

## 2024-05-05 ASSESSMENT — FIBROSIS 4 INDEX: FIB4 SCORE: 1.4

## 2024-05-05 NOTE — ED TRIAGE NOTES
74 yr old male to triage   Chief Complaint   Patient presents with    Fall     Patient report he was sleeping in a chair fell off landed on his right side of his body last night.  Hit his head (small redness and possible laceration to the right side of his head), right collarbone, right shoulder and right arm pain.  Patient denies taking blood thinners.     Arm Pain     BP (!) 144/89   Pulse 74   Temp 36.4 °C (97.5 °F) (Temporal)   Resp 18   Ht 1.829 m (6')   Wt 79.1 kg (174 lb 6.1 oz)   SpO2 98%   BMI 23.65 kg/m²     Patient takes aspirin 81 mg daily

## 2024-05-05 NOTE — ED NOTES
Medication history reviewed with pt. Med rec is complete.  Allergies reviewed, per pt  Interviewed pt with wife at bedside with permission from pt.    Patient has not had any outpatient antibiotics in the last 30 days.    Pt is not on any anticoagulants in the last 30 days, pt STOPPED his XARELTO 20MG about a month ago or longer.

## 2024-05-05 NOTE — ED NOTES
"PT. Reports \"I fell asleep on a stool last night\". Reports fall out of chair woke up up. Denies blood thinner use.  "

## 2024-05-05 NOTE — ED PROVIDER NOTES
"ED Provider Note        CHIEF COMPLAINT  Chief Complaint   Patient presents with    Fall     Patient report he was sleeping in a chair fell off landed on his right side of his body last night.  Hit his head (small redness and possible laceration to the right side of his head), right collarbone, right shoulder and right arm pain.  Patient denies taking blood thinners.     Arm Pain         HPI    OUTSIDE HISTORIAN(S):  Significant other wife was upstairs during the fall, notes no change in his personality since that time, no confusion    Hugo Gomez is a 74 y.o. male who presents to the Emergency Department after a fall.  The patient was sitting on a stool last night when he thinks he may have fallen asleep and fallen onto his right side.  Since that time he has had pain to his right shoulder.  He denies any nausea, vomiting.  He denies any chest pain, lightheadedness.  He does have a history of atrial fibrillation status post ablation, as well as CABG.  He did have symptoms with his cardiac ischemia in the past.  He denies any shortness of breath at this time.  Denies any neck pain, worsening of his symptoms with deep breaths, leg swelling    REVIEW OF SYSTEMS  See HPI for further details. All other systems are negative.     PAST MEDICAL HISTORY     Past Medical History:   Diagnosis Date    Anesthesia     pt felt fentanyl gave him \"hangover for 3 days\"    Arrhythmia     pt unaware    ASTHMA     prn    Breath shortness     Bronchitis     Cataract 2022    IOL bilat    Coronary artery disease due to lipid rich plaque - CABG     High cholesterol     History of myocardial infarction     unaware of at the time    Implantable loop recorder present 04/04/2023    Pain 09/16/2022    intermittent in chest with exercise    Pneumonia     Postoperative atrial fibrillation (HCC) 2013 in setting of CABG     Primary hypertension     S/P coronary artery bypass graft x 5     Sleep apnea     no cpap    Snoring     Typical " atrial flutter (HCC) sen 9/16/2022        SURGICAL HISTORY  Past Surgical History:   Procedure Laterality Date    CATARACT EXTRACTION WITH IOL Bilateral 2022    FISTULA IN ANO REPAIR  2018    MULTIPLE CORONARY ARTERY BYPASS ENDO VEIN HARVEST  12/07/2013    Performed by Mo Vincent M.D. at [facility]    CHOLECYSTECTOMY  1996    OTHER CARDIAC SURGERY      watchman    OTHER CARDIAC SURGERY      Loop Recorder       FAMILY HISTORY  Family History   Problem Relation Age of Onset    Diabetes Mother     Stroke Mother     Cancer Father         None    Heart Disease Neg Hx        SOCIAL HISTORY    reports that he has been smoking cigarettes. He started smoking about 55 years ago. He has a 27.6 pack-year smoking history. He has been exposed to tobacco smoke. He has never used smokeless tobacco. He reports current alcohol use of about 2.4 oz of alcohol per week. He reports that he does not use drugs.    CURRENT MEDICATIONS  Home Medications       Reviewed by Clarissa Park (Pharmacy Tech) on 05/05/24 at 1548  Med List Status: Complete     Medication Last Dose Status   aspirin 81 MG EC tablet 5/4/2024 Active   ezetimibe (ZETIA) 10 MG Tab 5/3/2024 Active   ibuprofen (ADVIL) 200 MG Tab 5/3/2024 Active   oxyCODONE-acetaminophen (PERCOCET-10)  MG Tab 5/5/2024 Active   rivaroxaban (XARELTO) 20 MG Tab tablet > 1 month Active   rosuvastatin (CRESTOR) 10 MG Tab 5/3/2024 Active                    ALLERGIES  Allergies   Allergen Reactions    Atorvastatin Diarrhea     Pt reports that it made him constipated and had diarrhea    Cat Hair Extract Runny Nose and Itching     .    Xarelto [Rivaroxaban]      Pt reports that it made him constipated and had diarrhea       PHYSICAL EXAM  VITAL SIGNS: BP (!) 144/89   Pulse 74   Temp 36.4 °C (97.5 °F) (Temporal)   Resp 18   Ht 1.829 m (6')   Wt 79.1 kg (174 lb 6.1 oz)   SpO2 98%   BMI 23.65 kg/m²   Gen: Alert, oriented  HENT: No racoon eyes septal hematoma, facial  instability  Eye: EOMI, no chemosis, PERRL  Neck: trachea midline, no tenderness  Resp: no respiratory distress, no chest wall tenderness or crepitus  CV: No JVD, RRR.  + peripheral pulses, no murmur  Abd: soft, non-distended, non-tender. No ecchymosis  Back: no spinal tenderness or deformities  Ext: No deformities, tenderness or edema  Psych: normal mood  Neuro: speech fluent, moves all extremities. GCS 15    DIAGNOSTIC STUDIES / PROCEDURES  EKG  I independently interpreted this EKG  Results for orders placed or performed during the hospital encounter of 24   EKG (NOW)   Result Value Ref Range    Report       [facility] Emergency Dept.    Test Date:  2024  Pt Name:    RACHEL ESTRELLA            Department: [facility]  MRN:        [id number]                      [facility]   Gender:     Male                         Technician: ILANA  :        1950                   Requested By:TILA ANDRADE  Order #:    [id number]                    Reading MD: Tila Andrade    Measurements  Intervals                                Axis  Rate:       61                           P:          14  WV:         168                          QRS:        -51  QRSD:       110                          T:          27  QT:         451  QTc:        455    Interpretive Statements  Sinus rhythm  Left anterior fascicular block  Compared to ECG 04/10/2024 15:36:29  Left anterior fascicular block now present  Electronically Signed On 2024 16:40:18 PDT by Tila Andrade             RADIOLOGY  I have independently interpreted the diagnostic imaging associated with this visit.  My preliminary interpretation is as follows: Chest x-ray: No pneumothorax, no pulmonary contusion.  Probable proximal clavicle fracture on the right  Radiologist interpretation:    CT-CHEST (THORAX) W/O   Final Result      1.  There is a mildly displaced fracture of the medial head of the right clavicle.   2.  No other acute fractures  are seen.   3.  No pneumothorax or pulmonary contusion.   4.  Extensive underlying emphysema and chronic bronchitis again seen with biapical areas of pleural and nodular parenchymal scarring.            DX-CLAVICLE RIGHT   Final Result      No RIGHT clavicle fracture.      DX-CHEST-PORTABLE (1 VIEW)   Final Result      1.  No evidence of acute intrathoracic injury.   2.  Prior open heart surgery.      CT-HEAD W/O   Final Result      1.  No acute intracranial abnormality detected.   2.  Right maxillary sinusitis.             COURSE & MEDICAL DECISION MAKING  Pertinent Labs & Imaging studies were reviewed. (See chart for details)      EXTERNAL RECORDS REVIEWED  Outpatient Notes patient seen by cardiology 4/10/2024, has loop recorder in situ, left atrial appendage closure.  Arrhythmia burden is only 0.2%, mostly artifact from loop recorder.  Advised stopping Xarelto, however recommends continuing aspirin 81 mg.      INITIAL ASSESSMENT AND PLAN  Care Narrative: Patient presents with fall with pain to his right shoulder.  Clinically appears to be proximal clavicle injury, which on my review of the chest x-ray is likely the case.  He does have an abrasion to the right side of the head and is on aspirin, however thankfully no evidence of intracranial bleed.  No evidence of displaced rib fractures, pulmonary contusion, pneumothorax on chest x-ray.  Patient demonstrates no stigmata of DVT/PE.  No signs or symptoms to suggest ACS or stroke as an etiology for his fall.  No signs of significant dehydration.     EKG demonstrates no high risk features for cardiac syncope, including ischemia, high-grade heart block, Brugada syndrome, Juju-Parkinson-White syndrome, arrhythmogenic right ventricular dysplasia, long QT, short QT, hypertrophic obstructive cardiomyopathy.    Although the x-ray of the clavicle is resulted as negative, I have high clinical concern for occult injury, will obtain CT.    CT chest demonstrates comminuted  fracture but no internal injuries.  No evidence of anterior displacement to affect the thoracic outlet.  Patient will be referred to orthopedics for follow-up.    ADDITIONAL PROBLEM LIST AND DISPOSITION    Escalation of care considered, and ultimately not performed: IV fluids and Laboratory analysis.     Recommend nonopioid over-the-counter pain medications    Patient is referred to primary care provider for blood pressure, diabetes and all other preventative health services.  Patient was given return precautions, anticipatory guidance, and the opportunity ask questions prior to discharge        FINAL IMPRESSION  1. Fall from stool    2. Closed nondisplaced fracture of sternal end of right clavicle, initial encounter           DISPOSITION:  Patient will be discharged home in stable condition.    FOLLOW UP:  Santi Monroy M.D.  [address] [facility] [address] [phone]    Schedule an appointment as soon as possible for a visit       [facility], Emergency Dept  [address]  [facility] [address] [phone]    If symptoms worsen    Love Nair P.A.-C.  [address] [address] [phone]    Schedule an appointment as soon as possible for a visit         This dictation was created using voice recognition software. The accuracy of the dictation is limited to the abilities of the software. I expect there may be some errors of grammar and possibly content. The nursing notes were reviewed and certain aspects of this information were incorporated into this note.

## 2024-05-06 NOTE — DISCHARGE INSTRUCTIONS
You were seen in the emerged part after falling off of a stool.  The CT scan of your head was reassuring.  The CAT scan of your chest does show a broken clavicle.  You have been placed in a sling for comfort.  You are being referred to follow-up with orthopedics.  Please contact the number to schedule an appointment.    Because it is not clear why you fell off of your stool we recommend following up with your primary care provider.    Return to the emergency department or seek medical attention if you develop:  Cold dusky fingers, uncontrolled pain, difficulty breathing, loss of consciousness, chest pain, any other new or concerning findings

## 2024-05-06 NOTE — ED NOTES
Reviewed discharge instructions w/ pt and visitor, verbalized understanding to information provided including follow up care w/ BULMARO, return precautions and sling care, denied questions/concerns.  Pt provided w/ printed three day work release note.  Pt ambulated from ED w/ visitor.

## 2024-05-17 ENCOUNTER — NON-PROVIDER VISIT (OUTPATIENT)
Dept: CARDIOLOGY | Facility: MEDICAL CENTER | Age: 74
End: 2024-05-17
Payer: COMMERCIAL

## 2024-05-17 PROCEDURE — 93298 REM INTERROG DEV EVAL SCRMS: CPT | Mod: 26 | Performed by: INTERNAL MEDICINE

## 2024-06-04 ENCOUNTER — APPOINTMENT (OUTPATIENT)
Dept: SLEEP MEDICINE | Facility: MEDICAL CENTER | Age: 74
End: 2024-06-04
Attending: NURSE PRACTITIONER
Payer: COMMERCIAL

## 2024-06-17 ENCOUNTER — NON-PROVIDER VISIT (OUTPATIENT)
Dept: CARDIOLOGY | Facility: MEDICAL CENTER | Age: 74
End: 2024-06-17
Payer: COMMERCIAL

## 2024-06-17 PROCEDURE — 93298 REM INTERROG DEV EVAL SCRMS: CPT | Mod: 26 | Performed by: INTERNAL MEDICINE

## 2024-07-18 ENCOUNTER — NON-PROVIDER VISIT (OUTPATIENT)
Dept: CARDIOLOGY | Facility: MEDICAL CENTER | Age: 74
End: 2024-07-18
Payer: COMMERCIAL

## 2024-07-23 PROCEDURE — 93298 REM INTERROG DEV EVAL SCRMS: CPT | Mod: 26 | Performed by: INTERNAL MEDICINE

## 2024-08-18 ENCOUNTER — NON-PROVIDER VISIT (OUTPATIENT)
Dept: CARDIOLOGY | Facility: MEDICAL CENTER | Age: 74
End: 2024-08-18
Payer: COMMERCIAL

## 2024-08-18 PROCEDURE — 93298 REM INTERROG DEV EVAL SCRMS: CPT | Mod: 26 | Performed by: INTERNAL MEDICINE

## 2024-08-19 NOTE — CARDIAC REMOTE MONITOR - SCAN
Device transmission reviewed. Device demonstrated appropriate function.       Electronically Signed by: Ivan Kelly M.D.    8/19/2024  3:37 PM

## 2024-09-18 ENCOUNTER — NON-PROVIDER VISIT (OUTPATIENT)
Dept: CARDIOLOGY | Facility: MEDICAL CENTER | Age: 74
End: 2024-09-18
Payer: COMMERCIAL

## 2024-09-18 PROCEDURE — 93298 REM INTERROG DEV EVAL SCRMS: CPT | Mod: 26 | Performed by: INTERNAL MEDICINE

## 2024-09-18 NOTE — CARDIAC REMOTE MONITOR - SCAN
Device transmission reviewed. Device demonstrated appropriate function.       Electronically Signed by: Ivan Kelly M.D.    9/18/2024  4:29 PM

## 2024-09-19 DIAGNOSIS — E78.5 DYSLIPIDEMIA: ICD-10-CM

## 2024-09-19 DIAGNOSIS — Z95.1 S/P CORONARY ARTERY BYPASS GRAFT X 5: ICD-10-CM

## 2024-09-19 RX ORDER — EZETIMIBE 10 MG/1
10 TABLET ORAL DAILY
Qty: 90 TABLET | Refills: 2 | Status: SHIPPED | OUTPATIENT
Start: 2024-09-19

## 2024-09-19 NOTE — TELEPHONE ENCOUNTER
Is the patient due for a refill? Yes    Was the patient seen the past year? Yes    Date of last office visit: 4.10.2024    Does the patient have an upcoming appointment?  Yes   If yes, When? 10.15.2024    Provider to refill:KIMI/TAMERA    Does the patient have halfway Plus and need 100-day supply? (This applies to ALL medications) Patient does not have SCP        Detail Level: Simple

## 2024-10-01 ENCOUNTER — PATIENT MESSAGE (OUTPATIENT)
Dept: CARDIOLOGY | Facility: MEDICAL CENTER | Age: 74
End: 2024-10-01
Payer: COMMERCIAL

## 2024-10-07 ENCOUNTER — HOSPITAL ENCOUNTER (OUTPATIENT)
Dept: LAB | Facility: MEDICAL CENTER | Age: 74
End: 2024-10-07
Attending: NURSE PRACTITIONER
Payer: COMMERCIAL

## 2024-10-07 DIAGNOSIS — I25.83 CORONARY ARTERY DISEASE DUE TO LIPID RICH PLAQUE: ICD-10-CM

## 2024-10-07 DIAGNOSIS — I25.10 CORONARY ARTERY DISEASE DUE TO LIPID RICH PLAQUE: ICD-10-CM

## 2024-10-07 DIAGNOSIS — Z95.1 S/P CORONARY ARTERY BYPASS GRAFT X 5: ICD-10-CM

## 2024-10-07 DIAGNOSIS — E78.2 MIXED HYPERLIPIDEMIA: ICD-10-CM

## 2024-10-07 LAB
ALBUMIN SERPL BCP-MCNC: 4.2 G/DL (ref 3.2–4.9)
ALBUMIN/GLOB SERPL: 1.1 G/DL
ALP SERPL-CCNC: 83 U/L (ref 30–99)
ALT SERPL-CCNC: 11 U/L (ref 2–50)
ANION GAP SERPL CALC-SCNC: 12 MMOL/L (ref 7–16)
AST SERPL-CCNC: 15 U/L (ref 12–45)
BILIRUB SERPL-MCNC: 0.5 MG/DL (ref 0.1–1.5)
BUN SERPL-MCNC: 19 MG/DL (ref 8–22)
CALCIUM ALBUM COR SERPL-MCNC: 10.4 MG/DL (ref 8.5–10.5)
CALCIUM SERPL-MCNC: 10.6 MG/DL (ref 8.5–10.5)
CHLORIDE SERPL-SCNC: 103 MMOL/L (ref 96–112)
CHOLEST SERPL-MCNC: 139 MG/DL (ref 100–199)
CO2 SERPL-SCNC: 25 MMOL/L (ref 20–33)
CREAT SERPL-MCNC: 1.45 MG/DL (ref 0.5–1.4)
FASTING STATUS PATIENT QL REPORTED: NORMAL
GFR SERPLBLD CREATININE-BSD FMLA CKD-EPI: 51 ML/MIN/1.73 M 2
GLOBULIN SER CALC-MCNC: 3.7 G/DL (ref 1.9–3.5)
GLUCOSE SERPL-MCNC: 95 MG/DL (ref 65–99)
HDLC SERPL-MCNC: 53 MG/DL
LDLC SERPL CALC-MCNC: 71 MG/DL
POTASSIUM SERPL-SCNC: 5 MMOL/L (ref 3.6–5.5)
PROT SERPL-MCNC: 7.9 G/DL (ref 6–8.2)
SODIUM SERPL-SCNC: 140 MMOL/L (ref 135–145)
TRIGL SERPL-MCNC: 75 MG/DL (ref 0–149)

## 2024-10-07 PROCEDURE — 80061 LIPID PANEL: CPT

## 2024-10-07 PROCEDURE — 36415 COLL VENOUS BLD VENIPUNCTURE: CPT

## 2024-10-07 PROCEDURE — 80053 COMPREHEN METABOLIC PANEL: CPT

## 2024-10-15 ENCOUNTER — OFFICE VISIT (OUTPATIENT)
Dept: CARDIOLOGY | Facility: MEDICAL CENTER | Age: 74
End: 2024-10-15
Attending: NURSE PRACTITIONER
Payer: COMMERCIAL

## 2024-10-15 VITALS
HEIGHT: 72 IN | WEIGHT: 165.8 LBS | RESPIRATION RATE: 16 BRPM | BODY MASS INDEX: 22.46 KG/M2 | HEART RATE: 55 BPM | OXYGEN SATURATION: 95 % | DIASTOLIC BLOOD PRESSURE: 60 MMHG | SYSTOLIC BLOOD PRESSURE: 108 MMHG

## 2024-10-15 DIAGNOSIS — Z95.1 S/P CORONARY ARTERY BYPASS GRAFT X 5: ICD-10-CM

## 2024-10-15 DIAGNOSIS — R26.89 BALANCE PROBLEMS: ICD-10-CM

## 2024-10-15 DIAGNOSIS — Z98.890 H/O CARDIAC RADIOFREQUENCY ABLATION: ICD-10-CM

## 2024-10-15 DIAGNOSIS — D68.69 HYPERCOAGULABLE STATE DUE TO PAROXYSMAL ATRIAL FIBRILLATION (HCC): ICD-10-CM

## 2024-10-15 DIAGNOSIS — Z95.818 PRESENCE OF WATCHMAN LEFT ATRIAL APPENDAGE CLOSURE DEVICE: ICD-10-CM

## 2024-10-15 DIAGNOSIS — I25.10 CORONARY ARTERY DISEASE DUE TO LIPID RICH PLAQUE: ICD-10-CM

## 2024-10-15 DIAGNOSIS — I48.3 TYPICAL ATRIAL FLUTTER (HCC): ICD-10-CM

## 2024-10-15 DIAGNOSIS — I25.83 CORONARY ARTERY DISEASE DUE TO LIPID RICH PLAQUE: ICD-10-CM

## 2024-10-15 DIAGNOSIS — R79.89 ELEVATED SERUM CREATININE: ICD-10-CM

## 2024-10-15 DIAGNOSIS — I48.0 HYPERCOAGULABLE STATE DUE TO PAROXYSMAL ATRIAL FIBRILLATION (HCC): ICD-10-CM

## 2024-10-15 DIAGNOSIS — E78.2 MIXED HYPERLIPIDEMIA: ICD-10-CM

## 2024-10-15 DIAGNOSIS — I27.20 PULMONARY HYPERTENSION (HCC): ICD-10-CM

## 2024-10-15 PROCEDURE — 99212 OFFICE O/P EST SF 10 MIN: CPT | Performed by: NURSE PRACTITIONER

## 2024-10-15 PROCEDURE — 99211 OFF/OP EST MAY X REQ PHY/QHP: CPT | Performed by: NURSE PRACTITIONER

## 2024-10-15 PROCEDURE — 93291 INTERROG DEV EVAL SCRMS IP: CPT | Performed by: NURSE PRACTITIONER

## 2024-10-15 PROCEDURE — 99214 OFFICE O/P EST MOD 30 MIN: CPT | Performed by: NURSE PRACTITIONER

## 2024-10-15 PROCEDURE — 93291 INTERROG DEV EVAL SCRMS IP: CPT | Mod: 26 | Performed by: NURSE PRACTITIONER

## 2024-10-15 ASSESSMENT — ENCOUNTER SYMPTOMS
SHORTNESS OF BREATH: 0
ORTHOPNEA: 0
PND: 0
DIZZINESS: 0
PALPITATIONS: 0
WEAKNESS: 0
WEIGHT LOSS: 0
CLAUDICATION: 0

## 2024-10-15 ASSESSMENT — FIBROSIS 4 INDEX: FIB4 SCORE: 1.34

## 2024-10-19 ENCOUNTER — NON-PROVIDER VISIT (OUTPATIENT)
Dept: CARDIOLOGY | Facility: MEDICAL CENTER | Age: 74
End: 2024-10-19
Payer: COMMERCIAL

## 2024-10-21 PROCEDURE — 93298 REM INTERROG DEV EVAL SCRMS: CPT | Mod: 26 | Performed by: INTERNAL MEDICINE

## 2024-11-19 ENCOUNTER — NON-PROVIDER VISIT (OUTPATIENT)
Dept: CARDIOLOGY | Facility: MEDICAL CENTER | Age: 74
End: 2024-11-19
Payer: COMMERCIAL

## 2024-11-19 PROCEDURE — 93298 REM INTERROG DEV EVAL SCRMS: CPT | Mod: 26 | Performed by: INTERNAL MEDICINE

## 2024-11-19 NOTE — CARDIAC REMOTE MONITOR - SCAN
Device transmission reviewed. Device demonstrated appropriate function.       Electronically Signed by: Ivan Kelly M.D.    11/19/2024  1:58 PM

## 2024-12-20 ENCOUNTER — NON-PROVIDER VISIT (OUTPATIENT)
Dept: CARDIOLOGY | Facility: MEDICAL CENTER | Age: 74
End: 2024-12-20
Payer: COMMERCIAL

## 2024-12-20 PROCEDURE — 93298 REM INTERROG DEV EVAL SCRMS: CPT | Performed by: STUDENT IN AN ORGANIZED HEALTH CARE EDUCATION/TRAINING PROGRAM

## 2025-01-20 ENCOUNTER — NON-PROVIDER VISIT (OUTPATIENT)
Dept: CARDIOLOGY | Facility: MEDICAL CENTER | Age: 75
End: 2025-01-20
Payer: COMMERCIAL

## 2025-01-20 PROCEDURE — 93298 REM INTERROG DEV EVAL SCRMS: CPT | Performed by: INTERNAL MEDICINE

## 2025-02-10 ENCOUNTER — APPOINTMENT (OUTPATIENT)
Dept: URBAN - METROPOLITAN AREA CLINIC 15 | Facility: CLINIC | Age: 75
Setting detail: DERMATOLOGY
End: 2025-02-10

## 2025-02-10 DIAGNOSIS — L57.8 OTHER SKIN CHANGES DUE TO CHRONIC EXPOSURE TO NONIONIZING RADIATION: ICD-10-CM

## 2025-02-10 DIAGNOSIS — D485 NEOPLASM OF UNCERTAIN BEHAVIOR OF SKIN: ICD-10-CM

## 2025-02-10 DIAGNOSIS — L57.0 ACTINIC KERATOSIS: ICD-10-CM

## 2025-02-10 PROBLEM — D48.5 NEOPLASM OF UNCERTAIN BEHAVIOR OF SKIN: Status: ACTIVE | Noted: 2025-02-10

## 2025-02-10 PROCEDURE — ? ORDER FOR PHOTODYNAMIC THERAPY

## 2025-02-10 PROCEDURE — ? BIOPSY BY SHAVE METHOD

## 2025-02-10 PROCEDURE — ? COUNSELING

## 2025-02-10 PROCEDURE — 11102 TANGNTL BX SKIN SINGLE LES: CPT

## 2025-02-10 PROCEDURE — ? ADDITIONAL NOTES

## 2025-02-10 PROCEDURE — 99213 OFFICE O/P EST LOW 20 MIN: CPT | Mod: 25

## 2025-02-10 ASSESSMENT — LOCATION DETAILED DESCRIPTION DERM
LOCATION DETAILED: RIGHT MEDIAL FRONTAL SCALP
LOCATION DETAILED: RIGHT CENTRAL FRONTAL SCALP
LOCATION DETAILED: MID-FRONTAL SCALP
LOCATION DETAILED: RIGHT DISTAL POSTERIOR UPPER ARM

## 2025-02-10 ASSESSMENT — LOCATION ZONE DERM
LOCATION ZONE: ARM
LOCATION ZONE: SCALP

## 2025-02-10 ASSESSMENT — LOCATION SIMPLE DESCRIPTION DERM
LOCATION SIMPLE: RIGHT UPPER ARM
LOCATION SIMPLE: ANTERIOR SCALP
LOCATION SIMPLE: RIGHT SCALP

## 2025-02-10 NOTE — PROCEDURE: ADDITIONAL NOTES
Detail Level: Simple
Render Risk Assessment In Note?: no
Additional Notes: Discussed with pt cutting his hair short (less than 1 cm) before treatment

## 2025-02-10 NOTE — PROCEDURE: BIOPSY BY SHAVE METHOD
Detail Level: Detailed
Depth Of Biopsy: dermis
Was A Bandage Applied: Yes
Size Of Lesion In Cm: 0
Anticipated Plan (Based On Presumed Biopsy Results): excision (pt scheduled 2/24/25 11:30- PENDING PATH)
Biopsy Type: H and E
Biopsy Method: Dermablade
Anesthesia Type: 1% lidocaine with epinephrine
Anesthesia Volume In Cc: 0.5
Hemostasis: Electrocautery
Wound Care: Petrolatum
Dressing: bandage
Destruction After The Procedure: No
Type Of Destruction Used: Curettage
Curettage Text: The wound bed was treated with curettage after the biopsy was performed.
Cryotherapy Text: The wound bed was treated with cryotherapy after the biopsy was performed.
Electrodesiccation Text: The wound bed was treated with electrodesiccation after the biopsy was performed.
Electrodesiccation And Curettage Text: The wound bed was treated with electrodesiccation and curettage after the biopsy was performed.
Silver Nitrate Text: The wound bed was treated with silver nitrate after the biopsy was performed.
Lab: [id number]
Lab Facility: 
Medical Necessity Information: It is in your best interest to select a reason for this procedure from the list below. All of these items fulfill various CMS LCD requirements except the new and changing color options.
Consent: Written consent was obtained and risks were reviewed including but not limited to scarring, infection, bleeding, scabbing, incomplete removal, nerve damage and allergy to anesthesia.
Post-Care Instructions: I reviewed with the patient in detail post-care instructions. Patient is to keep the biopsy site dry overnight, and then apply bacitracin twice daily until healed. Patient may apply hydrogen peroxide soaks to remove any crusting.
Notification Instructions: Patient will be notified of biopsy results. However, patient instructed to call the office if not contacted within 2 weeks.
Billing Type: Third-Party Bill
233
Information: Selecting Yes will display possible errors in your note based on the variables you have selected. This validation is only offered as a suggestion for you. PLEASE NOTE THAT THE VALIDATION TEXT WILL BE REMOVED WHEN YOU FINALIZE YOUR NOTE. IF YOU WANT TO FAX A PRELIMINARY NOTE YOU WILL NEED TO TOGGLE THIS TO 'NO' IF YOU DO NOT WANT IT IN YOUR FAXED NOTE.

## 2025-02-10 NOTE — PROCEDURE: ORDER FOR PHOTODYNAMIC THERAPY
Scalp Incubation Time: 2 Hours
Neck Incubation Time: 1 Hour
Pdt Type: Red Light
Location: Face
Detail Level: Simple
Face And Scalp Incubation Time: 1 Hour for the face and 2 Hours for the scalp
Frequency Of Pdt: Every 12 months
Consent: The procedure and risks were reviewed with the patient including but not limited to: burning, pigmentary changes, pain, blistering, scabbing, redness, and the possibility of needing numerous treatments. Strict photoprotection after the procedure was also discussed.
Occlusion: No
Debridement: Yes
Photosensitizer: Levulan

## 2025-02-20 ENCOUNTER — NON-PROVIDER VISIT (OUTPATIENT)
Dept: CARDIOLOGY | Facility: MEDICAL CENTER | Age: 75
End: 2025-02-20
Payer: COMMERCIAL

## 2025-02-20 PROCEDURE — 93298 REM INTERROG DEV EVAL SCRMS: CPT | Mod: 26 | Performed by: STUDENT IN AN ORGANIZED HEALTH CARE EDUCATION/TRAINING PROGRAM

## 2025-02-20 NOTE — CARDIAC REMOTE MONITOR - SCAN
Device transmission reviewed. Device demonstrated appropriate function.       Electronically Signed by: Pravin Horan MD, PhD    2/22/2025  11:41 PM

## 2025-02-24 ENCOUNTER — APPOINTMENT (OUTPATIENT)
Dept: URBAN - METROPOLITAN AREA CLINIC 15 | Facility: CLINIC | Age: 75
Setting detail: DERMATOLOGY
End: 2025-02-24

## 2025-02-24 ENCOUNTER — HOSPITAL ENCOUNTER (OUTPATIENT)
Facility: MEDICAL CENTER | Age: 75
End: 2025-02-24
Attending: NURSE PRACTITIONER
Payer: COMMERCIAL

## 2025-02-24 PROBLEM — C44.612 BASAL CELL CARCINOMA OF SKIN OF RIGHT UPPER LIMB, INCLUDING SHOULDER: Status: ACTIVE | Noted: 2025-02-24

## 2025-02-24 LAB
GRAM STN SPEC: NORMAL
SIGNIFICANT IND 70042: NORMAL
SITE SITE: NORMAL
SOURCE SOURCE: NORMAL

## 2025-02-24 PROCEDURE — 87070 CULTURE OTHR SPECIMN AEROBIC: CPT

## 2025-02-24 PROCEDURE — ? EXCISION

## 2025-02-24 PROCEDURE — 87205 SMEAR GRAM STAIN: CPT

## 2025-02-24 PROCEDURE — 13121 CMPLX RPR S/A/L 2.6-7.5 CM: CPT

## 2025-02-24 PROCEDURE — ? COUNSELING

## 2025-02-24 PROCEDURE — 11603 EXC TR-EXT MAL+MARG 2.1-3 CM: CPT

## 2025-02-24 NOTE — PROCEDURE: EXCISION
Surgeon (Optional): MICHAEL Shannon
Biopsy Photograph Reviewed: Yes
Size Of Lesion In Cm: 1.6
X Size Of Lesion In Cm (Optional): 1.4
Size Of Margin In Cm: 0.4
Anesthesia Volume In Cc: 0
Was An Eye Clamp Used?: No
Eye Clamp Note Details: An eye clamp was used during the procedure.
Excision Method: Elliptical
Saucerization Depth: dermis and superficial adipose tissue
Repair Type: Complex
Intermediate / Complex Repair - Final Wound Length In Cm: 5.2
Deep Sutures: 4-0 Vicryl
Epidermal Sutures: 5-0 Prolene
Suture Removal: 14 days
Suturegard Retention Suture: 2-0 Nylon
Retention Suture Bite Size: 3 mm
Length To Time In Minutes Device Was In Place: 10
Number Of Hemigard Strips Per Side: 1
Width Of Defect Perpendicular To Closure In Cm (Required): 2
Distance Of Undermining In Cm (Required): 2.2
Undermining Type: Entire Wound
Debridement Text: The wound edges were debrided prior to proceeding with the closure to facilitate wound healing.
Helical Rim Text: The closure involved the helical rim.
Vermilion Border Text: The closure involved the vermilion border.
Nostril Rim Text: The closure involved the nostril rim.
Retention Suture Text: Retention sutures were placed to support the closure and prevent dehiscence.
Lab: [id number]
Lab Facility: 
Graft Donor Site Bandage (Optional-Leave Blank If You Don't Want In Note): Steri-strips and a pressure bandage were applied to the donor site.
Epidermal Closure Graft Donor Site (Optional): simple interrupted
Billing Type: Third-Party Bill
Excision Depth: adipose tissue
Scalpel Size: 15 blade
Anesthesia Type: 1% lidocaine with epinephrine
Additional Anesthesia Volume In Cc: 5
Hemostasis: Electrocautery
Estimated Blood Loss (Cc): minimal
Detail Level: Detailed
Repair Depth: use same depth as excision depth
Anesthesia Volume In Cc: 6
Epidermal Closure: running
Wound Care: Petrolatum
Dressing: pressure dressing
Suturegard Intro: Intraoperative tissue expansion was performed, utilizing the SUTUREGARD device, in order to reduce wound tension.
Suturegard Body: The suture ends were repeatedly re-tightened and re-clamped to achieve the desired tissue expansion.
Hemigard Intro: Due to skin fragility and wound tension, it was decided to use HEMIGARD adhesive retention suture devices to permit a linear closure. The skin was cleaned and dried for a 6cm distance away from the wound. Excessive hair, if present, was removed to allow for adhesion.
Hemigard Postcare Instructions: The HEMIGARD strips are to remain completely dry for at least 5-7 days.
Positioning (Leave Blank If You Do Not Want): The patient was placed in a comfortable position exposing the surgical site.
Pre-Excision Curettage Text (Leave Blank If You Do Not Want): Prior to drawing the surgical margin the visible lesion was removed with curettage to clearly define the lesion size.
Complex Repair Preamble Text (Leave Blank If You Do Not Want): Extensive wide undermining was performed.
Intermediate Repair Preamble Text (Leave Blank If You Do Not Want): Undermining was performed with blunt dissection.
Curvilinear Excision Additional Text (Leave Blank If You Do Not Want): The margin was drawn around the clinically apparent lesion.  A curvilinear shape was then drawn on the skin incorporating the lesion and margins.  Incisions were then made along these lines to the appropriate tissue plane and the lesion was extirpated.
Fusiform Excision Additional Text (Leave Blank If You Do Not Want): The margin was drawn around the clinically apparent lesion.  A fusiform shape was then drawn on the skin incorporating the lesion and margins.  Incisions were then made along these lines to the appropriate tissue plane and the lesion was extirpated.
Elliptical Excision Additional Text (Leave Blank If You Do Not Want): The margin was drawn around the clinically apparent lesion.  An elliptical shape was then drawn on the skin incorporating the lesion and margins.  Incisions were then made along these lines to the appropriate tissue plane and the lesion was extirpated.
Saucerization Excision Additional Text (Leave Blank If You Do Not Want): The margin was drawn around the clinically apparent lesion.  Incisions were then made along these lines, in a tangential fashion, to the appropriate tissue plane and the lesion was extirpated.
Slit Excision Additional Text (Leave Blank If You Do Not Want): A linear line was drawn on the skin overlying the lesion. An incision was made slowly until the lesion was visualized.  Once visualized, the lesion was removed with blunt dissection.
Excisional Biopsy Additional Text (Leave Blank If You Do Not Want): The margin was drawn around the clinically apparent lesion. An elliptical shape was then drawn on the skin incorporating the lesion and margins.  Incisions were then made along these lines to the appropriate tissue plane and the lesion was extirpated.
Perilesional Excision Additional Text (Leave Blank If You Do Not Want): The margin was drawn around the clinically apparent lesion. Incisions were then made along these lines to the appropriate tissue plane and the lesion was extirpated.
Repair Performed By Another Provider Text (Leave Blank If You Do Not Want): After the tissue was excised the defect was repaired by another provider.
No Repair - Repaired With Adjacent Surgical Defect Text (Leave Blank If You Do Not Want): After the excision the defect was repaired concurrently with another surgical defect which was in close approximation.
Adjacent Tissue Transfer Text: The defect edges were debeveled with a #15 scalpel blade. Given the location of the defect and the proximity to free margins an adjacent tissue transfer was deemed most appropriate. Using a sterile surgical marker, an appropriate flap was drawn incorporating the defect and placing the expected incisions within the relaxed skin tension lines where possible. The area thus outlined was incised deep to adipose tissue with a #15 scalpel blade. The skin margins were undermined to an appropriate distance in all directions utilizing iris scissors and carried over to close the primary defect.
Advancement Flap (Single) Text: The defect edges were debeveled with a #15 scalpel blade. Given the location of the defect and the proximity to free margins a single advancement flap was deemed most appropriate. Using a sterile surgical marker, an appropriate advancement flap was drawn incorporating the defect and placing the expected incisions within the relaxed skin tension lines where possible. The area thus outlined was incised deep to adipose tissue with a #15 scalpel blade. The skin margins were undermined to an appropriate distance in all directions utilizing iris scissors. Following this, the designed flap was advanced and carried over into the primary defect and sutured into place.
Advancement Flap (Double) Text: The defect edges were debeveled with a #15 scalpel blade. Given the location of the defect and the proximity to free margins a double advancement flap was deemed most appropriate. Using a sterile surgical marker, the appropriate advancement flaps were drawn incorporating the defect and placing the expected incisions within the relaxed skin tension lines where possible. The area thus outlined was incised deep to adipose tissue with a #15 scalpel blade. The skin margins were undermined to an appropriate distance in all directions utilizing iris scissors. Following this, the designed flaps were advanced and carried over into the primary defect and sutured into place.
Burow's Advancement Flap Text: The defect edges were debeveled with a #15 scalpel blade. Given the location of the defect and the proximity to free margins a Burow's advancement flap was deemed most appropriate. Using a sterile surgical marker, the appropriate advancement flap was drawn incorporating the defect and placing the expected incisions within the relaxed skin tension lines where possible. The area thus outlined was incised deep to adipose tissue with a #15 scalpel blade. The skin margins were undermined to an appropriate distance in all directions utilizing iris scissors. Following this, the designed flap was advanced and carried over into the primary defect and sutured into place.
Chonodrocutaneous Helical Advancement Flap Text: The defect edges were debeveled with a #15 scalpel blade. Given the location of the defect and the proximity to free margins a chondrocutaneous helical advancement flap was deemed most appropriate. Using a sterile surgical marker, the appropriate advancement flap was drawn incorporating the defect and placing the expected incisions within the relaxed skin tension lines where possible. The area thus outlined was incised deep to adipose tissue with a #15 scalpel blade. The skin margins were undermined to an appropriate distance in all directions utilizing iris scissors. Following this, the designed flap was advanced and carried over into the primary defect and sutured into place.
Crescentic Advancement Flap Text: The defect edges were debeveled with a #15 scalpel blade. Given the location of the defect and the proximity to free margins a crescentic advancement flap was deemed most appropriate. Using a sterile surgical marker, the appropriate advancement flap was drawn incorporating the defect and placing the expected incisions within the relaxed skin tension lines where possible. The area thus outlined was incised deep to adipose tissue with a #15 scalpel blade. The skin margins were undermined to an appropriate distance in all directions utilizing iris scissors. Following this, the designed flap was advanced and carried over into the primary defect and sutured into place.
A-T Advancement Flap Text: The defect edges were debeveled with a #15 scalpel blade. Given the location of the defect, shape of the defect and the proximity to free margins an A-T advancement flap was deemed most appropriate. Using a sterile surgical marker, an appropriate advancement flap was drawn incorporating the defect and placing the expected incisions within the relaxed skin tension lines where possible. The area thus outlined was incised deep to adipose tissue with a #15 scalpel blade. The skin margins were undermined to an appropriate distance in all directions utilizing iris scissors. Following this, the designed flap was advanced and carried over into the primary defect and sutured into place.
O-T Advancement Flap Text: The defect edges were debeveled with a #15 scalpel blade. Given the location of the defect, shape of the defect and the proximity to free margins an O-T advancement flap was deemed most appropriate. Using a sterile surgical marker, an appropriate advancement flap was drawn incorporating the defect and placing the expected incisions within the relaxed skin tension lines where possible. The area thus outlined was incised deep to adipose tissue with a #15 scalpel blade. The skin margins were undermined to an appropriate distance in all directions utilizing iris scissors. Following this, the designed flap was advanced and carried over into the primary defect and sutured into place.
O-L Flap Text: The defect edges were debeveled with a #15 scalpel blade. Given the location of the defect, shape of the defect and the proximity to free margins an O-L flap was deemed most appropriate. Using a sterile surgical marker, an appropriate advancement flap was drawn incorporating the defect and placing the expected incisions within the relaxed skin tension lines where possible. The area thus outlined was incised deep to adipose tissue with a #15 scalpel blade. The skin margins were undermined to an appropriate distance in all directions utilizing iris scissors. Following this, the designed flap was advanced and carried over into the primary defect and sutured into place.
O-Z Flap Text: The defect edges were debeveled with a #15 scalpel blade. Given the location of the defect, shape of the defect and the proximity to free margins an O-Z flap was deemed most appropriate. Using a sterile surgical marker, an appropriate transposition flap was drawn incorporating the defect and placing the expected incisions within the relaxed skin tension lines where possible. The area thus outlined was incised deep to adipose tissue with a #15 scalpel blade. The skin margins were undermined to an appropriate distance in all directions utilizing iris scissors. Following this, the designed flap was carried over into the primary defect and sutured into place.
Double O-Z Flap Text: The defect edges were debeveled with a #15 scalpel blade. Given the location of the defect, shape of the defect and the proximity to free margins a Double O-Z flap was deemed most appropriate. Using a sterile surgical marker, an appropriate transposition flap was drawn incorporating the defect and placing the expected incisions within the relaxed skin tension lines where possible. The area thus outlined was incised deep to adipose tissue with a #15 scalpel blade. The skin margins were undermined to an appropriate distance in all directions utilizing iris scissors. Following this, the designed flap was carried over into the primary defect and sutured into place.
V-Y Flap Text: The defect edges were debeveled with a #15 scalpel blade. Given the location of the defect, shape of the defect and the proximity to free margins a V-Y flap was deemed most appropriate. Using a sterile surgical marker, an appropriate advancement flap was drawn incorporating the defect and placing the expected incisions within the relaxed skin tension lines where possible. The area thus outlined was incised deep to adipose tissue with a #15 scalpel blade. The skin margins were undermined to an appropriate distance in all directions utilizing iris scissors. Following this, the designed flap was advanced and carried over into the primary defect and sutured into place.
Advancement-Rotation Flap Text: The defect edges were debeveled with a #15 scalpel blade. Given the location of the defect, shape of the defect and the proximity to free margins an advancement-rotation flap was deemed most appropriate. Using a sterile surgical marker, an appropriate flap was drawn incorporating the defect and placing the expected incisions within the relaxed skin tension lines where possible. The area thus outlined was incised deep to adipose tissue with a #15 scalpel blade. The skin margins were undermined to an appropriate distance in all directions utilizing iris scissors. Following this, the designed flap was carried over into the primary defect and sutured into place.
Mercedes Flap Text: The defect edges were debeveled with a #15 scalpel blade. Given the location of the defect, shape of the defect and the proximity to free margins a Mercedes flap was deemed most appropriate. Using a sterile surgical marker, an appropriate advancement flap was drawn incorporating the defect and placing the expected incisions within the relaxed skin tension lines where possible. The area thus outlined was incised deep to adipose tissue with a #15 scalpel blade. The skin margins were undermined to an appropriate distance in all directions utilizing iris scissors. Following this, the designed flap was advanced and carried over into the primary defect and sutured into place.
Modified Advancement Flap Text: The defect edges were debeveled with a #15 scalpel blade. Given the location of the defect, shape of the defect and the proximity to free margins a modified advancement flap was deemed most appropriate. Using a sterile surgical marker, an appropriate advancement flap was drawn incorporating the defect and placing the expected incisions within the relaxed skin tension lines where possible. The area thus outlined was incised deep to adipose tissue with a #15 scalpel blade. The skin margins were undermined to an appropriate distance in all directions utilizing iris scissors. Following this, the designed flap was advanced and carried over into the primary defect and sutured into place.
Mucosal Advancement Flap Text: Given the location of the defect, shape of the defect and the proximity to free margins a mucosal advancement flap was deemed most appropriate. Incisions were made with a 15 blade scalpel in the appropriate fashion along the cutaneous vermilion border and the mucosal lip. The remaining actinically damaged mucosal tissue was excised.  The mucosal advancement flap was then elevated to the gingival sulcus with care taken to preserve the neurovascular structures and advanced into the primary defect. Care was taken to ensure that precise realignment of the vermilion border was achieved.
Peng Advancement Flap Text: The defect edges were debeveled with a #15 scalpel blade. Given the location of the defect, shape of the defect and the proximity to free margins a Peng advancement flap was deemed most appropriate. Using a sterile surgical marker, an appropriate advancement flap was drawn incorporating the defect and placing the expected incisions within the relaxed skin tension lines where possible. The area thus outlined was incised deep to adipose tissue with a #15 scalpel blade. The skin margins were undermined to an appropriate distance in all directions utilizing iris scissors. Following this, the designed flap was advanced and carried over into the primary defect and sutured into place.
Hatchet Flap Text: The defect edges were debeveled with a #15 scalpel blade. Given the location of the defect, shape of the defect and the proximity to free margins a hatchet flap was deemed most appropriate. Using a sterile surgical marker, an appropriate hatchet flap was drawn incorporating the defect and placing the expected incisions within the relaxed skin tension lines where possible. The area thus outlined was incised deep to adipose tissue with a #15 scalpel blade. The skin margins were undermined to an appropriate distance in all directions utilizing iris scissors. Following this, the designed flap was carried over into the primary defect and sutured into place.
Rotation Flap Text: The defect edges were debeveled with a #15 scalpel blade. Given the location of the defect, shape of the defect and the proximity to free margins a rotation flap was deemed most appropriate. Using a sterile surgical marker, an appropriate rotation flap was drawn incorporating the defect and placing the expected incisions within the relaxed skin tension lines where possible. The area thus outlined was incised deep to adipose tissue with a #15 scalpel blade. The skin margins were undermined to an appropriate distance in all directions utilizing iris scissors. Following this, the designed flap was carried over into the primary defect and sutured into place.
Bilateral Rotation Flap Text: The defect edges were debeveled with a #15 scalpel blade. Given the location of the defect, shape of the defect and the proximity to free margins a bilateral rotation flap was deemed most appropriate. Using a sterile surgical marker, an appropriate rotation flap was drawn incorporating the defect and placing the expected incisions within the relaxed skin tension lines where possible. The area thus outlined was incised deep to adipose tissue with a #15 scalpel blade. The skin margins were undermined to an appropriate distance in all directions utilizing iris scissors. Following this, the designed flap was carried over into the primary defect and sutured into place.
Spiral Flap Text: The defect edges were debeveled with a #15 scalpel blade. Given the location of the defect, shape of the defect and the proximity to free margins a spiral flap was deemed most appropriate. Using a sterile surgical marker, an appropriate rotation flap was drawn incorporating the defect and placing the expected incisions within the relaxed skin tension lines where possible. The area thus outlined was incised deep to adipose tissue with a #15 scalpel blade. The skin margins were undermined to an appropriate distance in all directions utilizing iris scissors. Following this, the designed flap was carried over into the primary defect and sutured into place.
Staged Advancement Flap Text: The defect edges were debeveled with a #15 scalpel blade. Given the location of the defect, shape of the defect and the proximity to free margins a staged advancement flap was deemed most appropriate. Using a sterile surgical marker, an appropriate advancement flap was drawn incorporating the defect and placing the expected incisions within the relaxed skin tension lines where possible. The area thus outlined was incised deep to adipose tissue with a #15 scalpel blade. The skin margins were undermined to an appropriate distance in all directions utilizing iris scissors. Following this, the designed flap was carried over into the primary defect and sutured into place.
Star Wedge Flap Text: The defect edges were debeveled with a #15 scalpel blade. Given the location of the defect, shape of the defect and the proximity to free margins a star wedge flap was deemed most appropriate. Using a sterile surgical marker, an appropriate rotation flap was drawn incorporating the defect and placing the expected incisions within the relaxed skin tension lines where possible. The area thus outlined was incised deep to adipose tissue with a #15 scalpel blade. The skin margins were undermined to an appropriate distance in all directions utilizing iris scissors. Following this, the designed flap was carried over into the primary defect and sutured into place.
Transposition Flap Text: The defect edges were debeveled with a #15 scalpel blade. Given the location of the defect and the proximity to free margins a transposition flap was deemed most appropriate. Using a sterile surgical marker, an appropriate transposition flap was drawn incorporating the defect. The area thus outlined was incised deep to adipose tissue with a #15 scalpel blade. The skin margins were undermined to an appropriate distance in all directions utilizing iris scissors. Following this, the designed flap was carried over into the primary defect and sutured into place.
Muscle Hinge Flap Text: The defect edges were debeveled with a #15 scalpel blade.  Given the size, depth and location of the defect and the proximity to free margins a muscle hinge flap was deemed most appropriate. Using a sterile surgical marker, an appropriate hinge flap was drawn incorporating the defect. The area thus outlined was incised with a #15 scalpel blade. The skin margins were undermined to an appropriate distance in all directions utilizing iris scissors. Following this, the designed flap was carried into the primary defect and sutured into place.
Mustarde Flap Text: The defect edges were debeveled with a #15 scalpel blade.  Given the size, depth and location of the defect and the proximity to free margins a Mustarde flap was deemed most appropriate. Using a sterile surgical marker, an appropriate flap was drawn incorporating the defect. The area thus outlined was incised with a #15 scalpel blade. The skin margins were undermined to an appropriate distance in all directions utilizing iris scissors. Following this, the designed flap was carried into the primary defect and sutured into place.
Nasal Turnover Hinge Flap Text: The defect edges were debeveled with a #15 scalpel blade.  Given the size, depth, location of the defect and the defect being full thickness a nasal turnover hinge flap was deemed most appropriate. Using a sterile surgical marker, an appropriate hinge flap was drawn incorporating the defect. The area thus outlined was incised with a #15 scalpel blade. The flap was designed to recreate the nasal mucosal lining and the alar rim. The skin margins were undermined to an appropriate distance in all directions utilizing iris scissors. Following this, the designed flap was carried over into the primary defect and sutured into place
Nasalis-Muscle-Based Myocutaneous Island Pedicle Flap Text: Using a #15 blade, an incision was made around the donor flap to the level of the nasalis muscle. Wide lateral undermining was then performed in both the subcutaneous plane above the nasalis muscle, and in a submuscular plane just above periosteum. This allowed the formation of a free nasalis muscle axial pedicle (based on the angular artery) which was still attached to the actual cutaneous flap, increasing its mobility and vascular viability. Hemostasis was obtained with pinpoint electrocoagulation. The flap was mobilized into position and the pivotal anchor points positioned and stabilized with buried interrupted sutures. Subcutaneous and dermal tissues were closed in a multilayered fashion with sutures. Tissue redundancies were excised, and the epidermal edges were apposed without significant tension and sutured with sutures.
Nasalis Myocutaneous Flap Text: Using a #15 blade, an incision was made around the donor flap to the level of the nasalis muscle. Wide lateral undermining was then performed in both the subcutaneous plane above the nasalis muscle, and in a submuscular plane just above periosteum. This allowed the formation of a free nasalis muscle axial pedicle which was still attached to the actual cutaneous flap, increasing its mobility and vascular viability. Hemostasis was obtained with pinpoint electrocoagulation. The flap was mobilized into position and the pivotal anchor points positioned and stabilized with buried interrupted sutures. Subcutaneous and dermal tissues were closed in a multilayered fashion with sutures. Tissue redundancies were excised, and the epidermal edges were apposed without significant tension and sutured with sutures.
Nasolabial Transposition Flap Text: The defect edges were debeveled with a #15 scalpel blade.  Given the size, depth and location of the defect and the proximity to free margins a nasolabial transposition flap was deemed most appropriate. Using a sterile surgical marker, an appropriate flap was drawn incorporating the defect. The area thus outlined was incised with a #15 scalpel blade. The skin margins were undermined to an appropriate distance in all directions utilizing iris scissors. Following this, the designed flap was carried into the primary defect and sutured into place.
Orbicularis Oris Muscle Flap Text: The defect edges were debeveled with a #15 scalpel blade.  Given that the defect affected the competency of the oral sphincter an orbicularis oris muscle flap was deemed most appropriate to restore this competency and normal muscle function.  Using a sterile surgical marker, an appropriate flap was drawn incorporating the defect. The area thus outlined was incised with a #15 scalpel blade. Following this, the designed flap was carried over into the primary defect and sutured into place.
Melolabial Transposition Flap Text: The defect edges were debeveled with a #15 scalpel blade. Given the location of the defect and the proximity to free margins a melolabial flap was deemed most appropriate. Using a sterile surgical marker, an appropriate melolabial transposition flap was drawn incorporating the defect. The area thus outlined was incised deep to adipose tissue with a #15 scalpel blade. The skin margins were undermined to an appropriate distance in all directions utilizing iris scissors. Following this, the designed flap was carried over into the primary defect and sutured into place.
Rectangular Flap Text: The defect edges were debeveled with a #15 scalpel blade. Given the location of the defect and the proximity to free margins a rectangular flap was deemed most appropriate. Using a sterile surgical marker, an appropriate rectangular flap was drawn incorporating the defect. The area thus outlined was incised deep to adipose tissue with a #15 scalpel blade. The skin margins were undermined to an appropriate distance in all directions utilizing iris scissors. Following this, the designed flap was carried over into the primary defect and sutured into place.
Rhombic Flap Text: The defect edges were debeveled with a #15 scalpel blade. Given the location of the defect and the proximity to free margins a rhombic flap was deemed most appropriate. Using a sterile surgical marker, an appropriate rhombic flap was drawn incorporating the defect. The area thus outlined was incised deep to adipose tissue with a #15 scalpel blade. The skin margins were undermined to an appropriate distance in all directions utilizing iris scissors. Following this, the designed flap was carried over into the primary defect and sutured into place.
Rhomboid Transposition Flap Text: The defect edges were debeveled with a #15 scalpel blade. Given the location of the defect and the proximity to free margins a rhomboid transposition flap was deemed most appropriate. Using a sterile surgical marker, an appropriate rhomboid flap was drawn incorporating the defect. The area thus outlined was incised deep to adipose tissue with a #15 scalpel blade. The skin margins were undermined to an appropriate distance in all directions utilizing iris scissors. Following this, the designed flap was carried over into the primary defect and sutured into place.
Bi-Rhombic Flap Text: The defect edges were debeveled with a #15 scalpel blade. Given the location of the defect and the proximity to free margins a bi-rhombic flap was deemed most appropriate. Using a sterile surgical marker, an appropriate rhombic flap was drawn incorporating the defect. The area thus outlined was incised deep to adipose tissue with a #15 scalpel blade. The skin margins were undermined to an appropriate distance in all directions utilizing iris scissors. Following this, the designed flap was carried over into the primary defect and sutured into place.
Helical Rim Advancement Flap Text: The defect edges were debeveled with a #15 blade scalpel.  Given the location of the defect and the proximity to free margins (helical rim) a double helical rim advancement flap was deemed most appropriate. Using a sterile surgical marker, the appropriate advancement flaps were drawn incorporating the defect and placing the expected incisions between the helical rim and antihelix where possible.  The area thus outlined was incised through and through with a #15 scalpel blade.  With a skin hook and iris scissors, the flaps were gently and sharply undermined and freed up. Folllowing this, the designed flaps were carried over into the primary defect and sutured into place.
Bilateral Helical Rim Advancement Flap Text: The defect edges were debeveled with a #15 blade scalpel.  Given the location of the defect and the proximity to free margins (helical rim) a bilateral helical rim advancement flap was deemed most appropriate. Using a sterile surgical marker, the appropriate advancement flaps were drawn incorporating the defect and placing the expected incisions between the helical rim and antihelix where possible.  The area thus outlined was incised through and through with a #15 scalpel blade.  With a skin hook and iris scissors, the flaps were gently and sharply undermined and freed up. Following this, the designed flaps were placed into the primary defect and sutured into place.
Ear Star Wedge Flap Text: The defect edges were debeveled with a #15 blade scalpel.  Given the location of the defect and the proximity to free margins (helical rim) an ear star wedge flap was deemed most appropriate. Using a sterile surgical marker, the appropriate flap was drawn incorporating the defect and placing the expected incisions between the helical rim and antihelix where possible.  The area thus outlined was incised through and through with a #15 scalpel blade. Following this, the designed flap was carried over into the primary defect and sutured into place.
Flip-Flop Flap Text: The defect edges were debeveled with a #15 blade scalpel.  Given the location of the defect and the proximity to free margins a flip-flop flap was deemed most appropriate. Using a sterile surgical marker, the appropriate flap was drawn incorporating the defect and placing the expected incisions between the helical rim and antihelix where possible.  The area thus outlined was incised through and through with a #15 scalpel blade. Following this, the designed flap was carried over into the primary defect and sutured into place.
Banner Transposition Flap Text: The defect edges were debeveled with a #15 scalpel blade. Given the location of the defect and the proximity to free margins a Banner transposition flap was deemed most appropriate. Using a sterile surgical marker, an appropriate flap was drawn around the defect. The area thus outlined was incised deep to adipose tissue with a #15 scalpel blade. The skin margins were undermined to an appropriate distance in all directions utilizing iris scissors. Following this, the designed flap was carried into the primary defect and sutured into place.
Bilobed Flap Text: The defect edges were debeveled with a #15 scalpel blade. Given the location of the defect and the proximity to free margins a bilobe flap was deemed most appropriate. Using a sterile surgical marker, an appropriate bilobe flap drawn around the defect. The area thus outlined was incised deep to adipose tissue with a #15 scalpel blade. The skin margins were undermined to an appropriate distance in all directions utilizing iris scissors. Following this, the designed flap was carried over into the primary defect and sutured into place.
Bilobed Transposition Flap Text: The defect edges were debeveled with a #15 scalpel blade. Given the location of the defect and the proximity to free margins a bilobed transposition flap was deemed most appropriate. Using a sterile surgical marker, an appropriate bilobe flap drawn around the defect. The area thus outlined was incised deep to adipose tissue with a #15 scalpel blade. The skin margins were undermined to an appropriate distance in all directions utilizing iris scissors. Following this, the designed flap was carried over into the primary defect and sutured into place.
Trilobed Flap Text: The defect edges were debeveled with a #15 scalpel blade. Given the location of the defect and the proximity to free margins a trilobed flap was deemed most appropriate. Using a sterile surgical marker, an appropriate trilobed flap was drawn around the defect. The area thus outlined was incised deep to adipose tissue with a #15 scalpel blade. The skin margins were undermined to an appropriate distance in all directions utilizing iris scissors. Following this, the designed flap was carried into the primary defect and sutured into place.
Dorsal Nasal Flap Text: The defect edges were debeveled with a #15 scalpel blade. Given the location of the defect and the proximity to free margins a dorsal nasal flap was deemed most appropriate. Using a sterile surgical marker, an appropriate dorsal nasal flap was drawn around the defect. The area thus outlined was incised deep to adipose tissue with a #15 scalpel blade. The skin margins were undermined to an appropriate distance in all directions utilizing iris scissors. Following this, the designed flap was carried into the primary defect and sutured into place.
Island Pedicle Flap Text: The defect edges were debeveled with a #15 scalpel blade. Given the location of the defect, shape of the defect and the proximity to free margins an island pedicle advancement flap was deemed most appropriate. Using a sterile surgical marker, an appropriate advancement flap was drawn incorporating the defect, outlining the appropriate donor tissue and placing the expected incisions within the relaxed skin tension lines where possible. The area thus outlined was incised deep to adipose tissue with a #15 scalpel blade. The skin margins were undermined to an appropriate distance in all directions around the primary defect and laterally outward around the island pedicle utilizing iris scissors.  There was minimal undermining beneath the pedicle flap. Following this, the flap was carried over into the primary defect and sutured into place.
Island Pedicle Flap With Canthal Suspension Text: The defect edges were debeveled with a #15 scalpel blade. Given the location of the defect, shape of the defect and the proximity to free margins an island pedicle advancement flap was deemed most appropriate. Using a sterile surgical marker, an appropriate advancement flap was drawn incorporating the defect, outlining the appropriate donor tissue and placing the expected incisions within the relaxed skin tension lines where possible. The area thus outlined was incised deep to adipose tissue with a #15 scalpel blade. The skin margins were undermined to an appropriate distance in all directions around the primary defect and laterally outward around the island pedicle utilizing iris scissors.  There was minimal undermining beneath the pedicle flap. A suspension suture was placed in the canthal tendon to prevent tension and prevent ectropion. Following this, the designed flap was placed into the primary defect and sutured into place.
Alar Island Pedicle Flap Text: The defect edges were debeveled with a #15 scalpel blade. Given the location of the defect, shape of the defect and the proximity to the alar rim an island pedicle advancement flap was deemed most appropriate. Using a sterile surgical marker, an appropriate advancement flap was drawn incorporating the defect, outlining the appropriate donor tissue and placing the expected incisions within the nasal ala running parallel to the alar rim. The area thus outlined was incised with a #15 scalpel blade. The skin margins were undermined minimally to an appropriate distance in all directions around the primary defect and laterally outward around the island pedicle utilizing iris scissors.  There was minimal undermining beneath the pedicle flap. Following this, the designed flap was carried over into the primary defect and sutured into place.
Double Island Pedicle Flap Text: The defect edges were debeveled with a #15 scalpel blade. Given the location of the defect, shape of the defect and the proximity to free margins a double island pedicle advancement flap was deemed most appropriate. Using a sterile surgical marker, an appropriate advancement flap was drawn incorporating the defect, outlining the appropriate donor tissue and placing the expected incisions within the relaxed skin tension lines where possible. The area thus outlined was incised deep to adipose tissue with a #15 scalpel blade. The skin margins were undermined to an appropriate distance in all directions around the primary defect and laterally outward around the island pedicle utilizing iris scissors.  There was minimal undermining beneath the pedicle flap. Following this, the flap was carried over into the primary defect and sutured into place.
Island Pedicle Flap-Requiring Vessel Identification Text: The defect edges were debeveled with a #15 scalpel blade. Given the location of the defect, shape of the defect and the proximity to free margins an island pedicle advancement flap was deemed most appropriate. Using a sterile surgical marker, an appropriate advancement flap was drawn, based on the axial vessel mentioned above, incorporating the defect, outlining the appropriate donor tissue and placing the expected incisions within the relaxed skin tension lines where possible. The area thus outlined was incised deep to adipose tissue with a #15 scalpel blade. The skin margins were undermined to an appropriate distance in all directions around the primary defect and laterally outward around the island pedicle utilizing iris scissors.  There was minimal undermining beneath the pedicle flap. Following this, the designed flap was carried over into the primary defect and sutured into place.
Keystone Flap Text: The defect edges were debeveled with a #15 scalpel blade. Given the location of the defect, shape of the defect a keystone flap was deemed most appropriate. Using a sterile surgical marker, an appropriate keystone flap was drawn incorporating the defect, outlining the appropriate donor tissue and placing the expected incisions within the relaxed skin tension lines where possible. The area thus outlined was incised deep to adipose tissue with a #15 scalpel blade. The skin margins were undermined to an appropriate distance in all directions around the primary defect and laterally outward around the flap utilizing iris scissors. Following this, the designed flap was carried into the primary defect and sutured into place.
O-T Plasty Text: The defect edges were debeveled with a #15 scalpel blade. Given the location of the defect, shape of the defect and the proximity to free margins an O-T plasty was deemed most appropriate. Using a sterile surgical marker, an appropriate O-T plasty was drawn incorporating the defect and placing the expected incisions within the relaxed skin tension lines where possible. The area thus outlined was incised deep to adipose tissue with a #15 scalpel blade. The skin margins were undermined to an appropriate distance in all directions utilizing iris scissors. Following this, the designed flap was carried over into the primary defect and sutured into place.
O-Z Plasty Text: The defect edges were debeveled with a #15 scalpel blade. Given the location of the defect, shape of the defect and the proximity to free margins an O-Z plasty (double transposition flap) was deemed most appropriate. Using a sterile surgical marker, the appropriate transposition flaps were drawn incorporating the defect and placing the expected incisions within the relaxed skin tension lines where possible. The area thus outlined was incised deep to adipose tissue with a #15 scalpel blade. The skin margins were undermined to an appropriate distance in all directions utilizing iris scissors. Hemostasis was achieved with electrocautery. The flaps were then transposed and carried over into place, one clockwise and the other counterclockwise, and anchored with interrupted buried subcutaneous sutures.
Double O-Z Plasty Text: The defect edges were debeveled with a #15 scalpel blade. Given the location of the defect, shape of the defect and the proximity to free margins a Double O-Z plasty (double transposition flap) was deemed most appropriate. Using a sterile surgical marker, the appropriate transposition flaps were drawn incorporating the defect and placing the expected incisions within the relaxed skin tension lines where possible. The area thus outlined was incised deep to adipose tissue with a #15 scalpel blade. The skin margins were undermined to an appropriate distance in all directions utilizing iris scissors. Hemostasis was achieved with electrocautery. The flaps were then transposed and carried over into place, one clockwise and the other counterclockwise, and anchored with interrupted buried subcutaneous sutures.
V-Y Plasty Text: The defect edges were debeveled with a #15 scalpel blade. Given the location of the defect, shape of the defect and the proximity to free margins an V-Y advancement flap was deemed most appropriate. Using a sterile surgical marker, an appropriate advancement flap was drawn incorporating the defect and placing the expected incisions within the relaxed skin tension lines where possible. The area thus outlined was incised deep to adipose tissue with a #15 scalpel blade. The skin margins were undermined to an appropriate distance in all directions utilizing iris scissors. Following this, the designed flap was advanced and carried over into the primary defect and sutured into place.
H Plasty Text: Given the location of the defect, shape of the defect and the proximity to free margins a H-plasty was deemed most appropriate for repair. Using a sterile surgical marker, the appropriate advancement arms of the H-plasty were drawn incorporating the defect and placing the expected incisions within the relaxed skin tension lines where possible. The area thus outlined was incised deep to adipose tissue with a #15 scalpel blade. The skin margins were undermined to an appropriate distance in all directions utilizing iris scissors.  The opposing advancement arms were then advanced and carried over into place in opposite direction and anchored with interrupted buried subcutaneous sutures.
W Plasty Text: The lesion was extirpated to the level of the fat with a #15 scalpel blade. Given the location of the defect, shape of the defect and the proximity to free margins a W-plasty was deemed most appropriate for repair. Using a sterile surgical marker, the appropriate transposition arms of the W-plasty were drawn incorporating the defect and placing the expected incisions within the relaxed skin tension lines where possible. The area thus outlined was incised deep to adipose tissue with a #15 scalpel blade. The skin margins were undermined to an appropriate distance in all directions utilizing iris scissors. The opposing transposition arms were then transposed and carried over into place in opposite direction and anchored with interrupted buried subcutaneous sutures.
Z Plasty Text: The lesion was extirpated to the level of the fat with a #15 scalpel blade. Given the location of the defect, shape of the defect and the proximity to free margins a Z-plasty was deemed most appropriate for repair. Using a sterile surgical marker, the appropriate transposition arms of the Z-plasty were drawn incorporating the defect and placing the expected incisions within the relaxed skin tension lines where possible. The area thus outlined was incised deep to adipose tissue with a #15 scalpel blade. The skin margins were undermined to an appropriate distance in all directions utilizing iris scissors. The opposing transposition arms were then transposed and carried over into place in opposite direction and anchored with interrupted buried subcutaneous sutures.
Double Z Plasty Text: The lesion was extirpated to the level of the fat with a #15 scalpel blade. Given the location of the defect, shape of the defect and the proximity to free margins a double Z-plasty was deemed most appropriate for repair. Using a sterile surgical marker, the appropriate transposition arms of the double Z-plasty were drawn incorporating the defect and placing the expected incisions within the relaxed skin tension lines where possible. The area thus outlined was incised deep to adipose tissue with a #15 scalpel blade. The skin margins were undermined to an appropriate distance in all directions utilizing iris scissors. The opposing transposition arms were then transposed and carried over into place in opposite direction and anchored with interrupted buried subcutaneous sutures.
Zygomaticofacial Flap Text: Given the location of the defect, shape of the defect and the proximity to free margins a zygomaticofacial flap was deemed most appropriate for repair. Using a sterile surgical marker, the appropriate flap was drawn incorporating the defect and placing the expected incisions within the relaxed skin tension lines where possible. The area thus outlined was incised deep to adipose tissue with a #15 scalpel blade with preservation of a vascular pedicle.  The skin margins were undermined to an appropriate distance in all directions utilizing iris scissors. The flap was then carried over into the defect and anchored with interrupted buried subcutaneous sutures.
Cheek Interpolation Flap Text: A decision was made to reconstruct the defect utilizing an interpolation axial flap and a staged reconstruction.  A telfa template was made of the defect.  This telfa template was then used to outline the Cheek Interpolation flap.  The donor area for the pedicle flap was then injected with anesthesia.  The flap was excised through the skin and subcutaneous tissue down to the layer of the underlying musculature.  The interpolation flap was carefully excised within this deep plane to maintain its blood supply.  The edges of the donor site were undermined.   The donor site was closed in a primary fashion.  The pedicle was then rotated into position and sutured.  Once the tube was sutured into place, adequate blood supply was confirmed with blanching and refill.  The pedicle was then wrapped with xeroform gauze and dressed appropriately with a telfa and gauze bandage to ensure continued blood supply and protect the attached pedicle.
Cheek-To-Nose Interpolation Flap Text: A decision was made to reconstruct the defect utilizing an interpolation axial flap and a staged reconstruction.  A telfa template was made of the defect.  This telfa template was then used to outline the Cheek-To-Nose Interpolation flap.  The donor area for the pedicle flap was then injected with anesthesia.  The flap was excised through the skin and subcutaneous tissue down to the layer of the underlying musculature.  The interpolation flap was carefully excised within this deep plane to maintain its blood supply.  The edges of the donor site were undermined.   The donor site was closed in a primary fashion.  The pedicle was then rotated into position and sutured.  Once the tube was sutured into place, adequate blood supply was confirmed with blanching and refill.  The pedicle was then wrapped with xeroform gauze and dressed appropriately with a telfa and gauze bandage to ensure continued blood supply and protect the attached pedicle.
Interpolation Flap Text: A decision was made to reconstruct the defect utilizing an interpolation axial flap and a staged reconstruction.  A telfa template was made of the defect.  This telfa template was then used to outline the interpolation flap.  The donor area for the pedicle flap was then injected with anesthesia.  The flap was excised through the skin and subcutaneous tissue down to the layer of the underlying musculature.  The interpolation flap was carefully excised within this deep plane to maintain its blood supply.  The edges of the donor site were undermined.   The donor site was closed in a primary fashion.  The pedicle was then rotated into position and sutured.  Once the tube was sutured into place, adequate blood supply was confirmed with blanching and refill.  The pedicle was then wrapped with xeroform gauze and dressed appropriately with a telfa and gauze bandage to ensure continued blood supply and protect the attached pedicle.
Melolabial Interpolation Flap Text: A decision was made to reconstruct the defect utilizing an interpolation axial flap and a staged reconstruction.  A telfa template was made of the defect.  This telfa template was then used to outline the melolabial interpolation flap.  The donor area for the pedicle flap was then injected with anesthesia.  The flap was excised through the skin and subcutaneous tissue down to the layer of the underlying musculature.  The pedicle flap was carefully excised within this deep plane to maintain its blood supply.  The edges of the donor site were undermined.   The donor site was closed in a primary fashion.  The pedicle was then rotated into position and sutured.  Once the tube was sutured into place, adequate blood supply was confirmed with blanching and refill.  The pedicle was then wrapped with xeroform gauze and dressed appropriately with a telfa and gauze bandage to ensure continued blood supply and protect the attached pedicle.
Mastoid Interpolation Flap Text: A decision was made to reconstruct the defect utilizing an interpolation axial flap and a staged reconstruction.  A telfa template was made of the defect.  This telfa template was then used to outline the mastoid interpolation flap.  The donor area for the pedicle flap was then injected with anesthesia.  The flap was excised through the skin and subcutaneous tissue down to the layer of the underlying musculature.  The pedicle flap was carefully excised within this deep plane to maintain its blood supply.  The edges of the donor site were undermined.   The donor site was closed in a primary fashion.  The pedicle was then rotated into position and sutured.  Once the tube was sutured into place, adequate blood supply was confirmed with blanching and refill.  The pedicle was then wrapped with xeroform gauze and dressed appropriately with a telfa and gauze bandage to ensure continued blood supply and protect the attached pedicle.
Posterior Auricular Interpolation Flap Text: A decision was made to reconstruct the defect utilizing an interpolation axial flap and a staged reconstruction.  A telfa template was made of the defect.  This telfa template was then used to outline the posterior auricular interpolation flap.  The donor area for the pedicle flap was then injected with anesthesia.  The flap was excised through the skin and subcutaneous tissue down to the layer of the underlying musculature.  The pedicle flap was carefully excised within this deep plane to maintain its blood supply.  The edges of the donor site were undermined.   The donor site was closed in a primary fashion.  The pedicle was then rotated into position and sutured.  Once the tube was sutured into place, adequate blood supply was confirmed with blanching and refill.  The pedicle was then wrapped with xeroform gauze and dressed appropriately with a telfa and gauze bandage to ensure continued blood supply and protect the attached pedicle.
Paramedian Forehead Flap Text: A decision was made to reconstruct the defect utilizing an interpolation axial flap and a staged reconstruction.  A telfa template was made of the defect.  This telfa template was then used to outline the paramedian forehead pedicle flap.  The donor area for the pedicle flap was then injected with anesthesia.  The flap was excised through the skin and subcutaneous tissue down to the layer of the underlying musculature.  The pedicle flap was carefully excised within this deep plane to maintain its blood supply.  The edges of the donor site were undermined.   The donor site was closed in a primary fashion.  The pedicle was then rotated into position and sutured.  Once the tube was sutured into place, adequate blood supply was confirmed with blanching and refill.  The pedicle was then wrapped with xeroform gauze and dressed appropriately with a telfa and gauze bandage to ensure continued blood supply and protect the attached pedicle.
Abbe Flap (Upper To Lower Lip) Text: The defect of the lower lip was assessed and measured.  Given the location and size of the defect, an Abbe flap was deemed most appropriate. Using a sterile surgical marker, an appropriate Abbe flap was measured and drawn on the upper lip. Local anesthesia was then infiltrated.  A scalpel was then used to incise the upper lip through and through the skin, vermilion, muscle and mucosa, leaving the flap pedicled on the opposite side.  The flap was then rotated and transferred to the lower lip defect.  The flap was then sutured into place with a three layer technique, closing the orbicularis oris muscle layer with subcutaneous buried sutures, followed by a mucosal layer and an epidermal layer.
Abbe Flap (Lower To Upper Lip) Text: The defect of the upper lip was assessed and measured.  Given the location and size of the defect, an Abbe flap was deemed most appropriate. Using a sterile surgical marker, an appropriate Abbe flap was measured and drawn on the lower lip. Local anesthesia was then infiltrated. A scalpel was then used to incise the upper lip through and through the skin, vermilion, muscle and mucosa, leaving the flap pedicled on the opposite side.  The flap was then rotated and transferred to the lower lip defect.  The flap was then sutured into place with a three layer technique, closing the orbicularis oris muscle layer with subcutaneous buried sutures, followed by a mucosal layer and an epidermal layer.
Estlander Flap (Upper To Lower Lip) Text: The defect of the lower lip was assessed and measured.  Given the location and size of the defect, an Estlander flap was deemed most appropriate. Using a sterile surgical marker, an appropriate Estlander flap was measured and drawn on the upper lip. Local anesthesia was then infiltrated. A scalpel was then used to incise the lateral aspect of the flap, through skin, muscle and mucosa, leaving the flap pedicled medially.  The flap was then rotated and positioned to fill the lower lip defect.  The flap was then sutured into place with a three layer technique, closing the orbicularis oris muscle layer with subcutaneous buried sutures, followed by a mucosal layer and an epidermal layer.
Lip Wedge Excision Repair Text: Given the location of the defect and the proximity to free margins a full thickness wedge repair was deemed most appropriate. Using a sterile surgical marker, the appropriate repair was drawn incorporating the defect and placing the expected incisions perpendicular to the vermilion border.  The vermilion border was also meticulously outlined to ensure appropriate reapproximation during the repair.  The area thus outlined was incised through and through with a #15 scalpel blade.  The muscularis and dermis were reaproximated with deep sutures following hemostasis. Care was taken to realign the vermilion border before proceeding with the superficial closure.  Once the vermilion was realigned the superfical and mucosal closure was finished.
Ftsg Text: The defect edges were debeveled with a #15 scalpel blade. Given the location of the defect, shape of the defect and the proximity to free margins a full thickness skin graft was deemed most appropriate. Using a sterile surgical marker, the primary defect shape was transferred to the donor site. The area thus outlined was incised deep to adipose tissue with a #15 scalpel blade.  The harvested graft was then trimmed of adipose tissue until only dermis and epidermis was left.  The skin graft was then placed in the primary defect and oriented appropriately.
Split-Thickness Skin Graft Text: The defect edges were debeveled with a #15 scalpel blade. Given the location of the defect, shape of the defect and the proximity to free margins a split thickness skin graft was deemed most appropriate. Using a sterile surgical marker, the primary defect shape was transferred to the donor site. The split thickness graft was then harvested.  The skin graft was then placed in the primary defect and oriented appropriately.
Pinch Graft Text: The defect edges were debeveled with a #15 scalpel blade. Given the location of the defect, shape of the defect and the proximity to free margins a pinch graft was deemed most appropriate. Using a sterile surgical marker, the primary defect shape was transferred to the donor site. The area thus outlined was incised deep to adipose tissue with a #15 scalpel blade.  The harvested graft was then trimmed of adipose tissue until only dermis and epidermis was left. The skin graft was then placed in the primary defect and oriented appropriately.
Burow's Graft Text: The defect edges were debeveled with a #15 scalpel blade. Given the location of the defect, shape of the defect, the proximity to free margins and the presence of a standing cone deformity a Burow's skin graft was deemed most appropriate. The standing cone was removed and this tissue was then trimmed to the shape of the primary defect. The adipose tissue was also removed until only dermis and epidermis were left.  The skin graft was then placed in the primary defect and oriented appropriately.
Cartilage Graft Text: The defect edges were debeveled with a #15 scalpel blade. Given the location of the defect, shape of the defect, the fact the defect involved a full thickness cartilage defect a cartilage graft was deemed most appropriate.  An appropriate donor site was identified, cleansed, and anesthetized. The cartilage graft was then harvested and transferred to the recipient site, oriented appropriately and then sutured into place.  The secondary defect was then repaired using a primary closure.
Composite Graft Text: The defect edges were debeveled with a #15 scalpel blade. Given the location of the defect, shape of the defect, the proximity to free margins and the fact the defect was full thickness a composite graft was deemed most appropriate.  The defect was outline and then transferred to the donor site.  A full thickness graft was then excised from the donor site. The graft was then placed in the primary defect, oriented appropriately and then sutured into place.  The secondary defect was then repaired using a primary closure.
Epidermal Autograft Text: The defect edges were debeveled with a #15 scalpel blade. Given the location of the defect, shape of the defect and the proximity to free margins an epidermal autograft was deemed most appropriate. Using a sterile surgical marker, the primary defect shape was transferred to the donor site. The epidermal graft was then harvested.  The skin graft was then placed in the primary defect and oriented appropriately.
Dermal Autograft Text: The defect edges were debeveled with a #15 scalpel blade. Given the location of the defect, shape of the defect and the proximity to free margins a dermal autograft was deemed most appropriate. Using a sterile surgical marker, the primary defect shape was transferred to the donor site. The area thus outlined was incised deep to adipose tissue with a #15 scalpel blade.  The harvested graft was then trimmed of adipose and epidermal tissue until only dermis was left.  The skin graft was then placed in the primary defect and oriented appropriately.
Skin Substitute Text: The defect edges were debeveled with a #15 scalpel blade. Given the location of the defect, shape of the defect and the proximity to free margins a skin substitute graft was deemed most appropriate.  The graft material was trimmed to fit the size of the defect. The graft was then placed in the primary defect and oriented appropriately.
Tissue Cultured Epidermal Autograft Text: The defect edges were debeveled with a #15 scalpel blade. Given the location of the defect, shape of the defect and the proximity to free margins a tissue cultured epidermal autograft was deemed most appropriate.  The graft was then trimmed to fit the size of the defect.  The graft was then placed in the primary defect and oriented appropriately.
Xenograft Text: The defect edges were debeveled with a #15 scalpel blade. Given the location of the defect, shape of the defect and the proximity to free margins a xenograft was deemed most appropriate.  The graft was then trimmed to fit the size of the defect.  The graft was then placed in the primary defect and oriented appropriately.
Purse String (Intermediate) Text: Given the location of the defect and the characteristics of the surrounding skin a purse string intermediate closure was deemed most appropriate.  Undermining was performed circumferentially around the surgical defect.  A purse string suture was then placed and tightened.
Purse String (Simple) Text: Given the location of the defect and the characteristics of the surrounding skin a purse string simple closure was deemed most appropriate.  Undermining was performed circumferentially around the surgical defect.  A purse string suture was then placed and tightened.
Partial Purse String (Intermediate) Text: Given the location of the defect and the characteristics of the surrounding skin an intermediate purse string closure was deemed most appropriate.  Undermining was performed circumferentially around the surgical defect.  A purse string suture was then placed and tightened. Wound tension of the circular defect prevented complete closure of the wound.
Partial Purse String (Simple) Text: Given the location of the defect and the characteristics of the surrounding skin a simple purse string closure was deemed most appropriate.  Undermining was performed circumferentially around the surgical defect.  A purse string suture was then placed and tightened. Wound tension of the circular defect prevented complete closure of the wound.
Complex Repair And Single Advancement Flap Text: The defect edges were debeveled with a #15 scalpel blade.  The primary defect was closed partially with a complex linear closure.  Given the location of the remaining defect, shape of the defect and the proximity to free margins a single advancement flap was deemed most appropriate for complete closure of the defect.  Using a sterile surgical marker, an appropriate advancement flap was drawn incorporating the defect and placing the expected incisions within the relaxed skin tension lines where possible. The area thus outlined was incised deep to adipose tissue with a #15 scalpel blade. The skin margins were undermined to an appropriate distance in all directions utilizing iris scissors and carried over to close the primary defect.
Complex Repair And Double Advancement Flap Text: The defect edges were debeveled with a #15 scalpel blade.  The primary defect was closed partially with a complex linear closure.  Given the location of the remaining defect, shape of the defect and the proximity to free margins a double advancement flap was deemed most appropriate for complete closure of the defect.  Using a sterile surgical marker, an appropriate advancement flap was drawn incorporating the defect and placing the expected incisions within the relaxed skin tension lines where possible. The area thus outlined was incised deep to adipose tissue with a #15 scalpel blade. The skin margins were undermined to an appropriate distance in all directions utilizing iris scissors and carried over to close the primary defect.
Complex Repair And Modified Advancement Flap Text: The defect edges were debeveled with a #15 scalpel blade.  The primary defect was closed partially with a complex linear closure.  Given the location of the remaining defect, shape of the defect and the proximity to free margins a modified advancement flap was deemed most appropriate for complete closure of the defect.  Using a sterile surgical marker, an appropriate advancement flap was drawn incorporating the defect and placing the expected incisions within the relaxed skin tension lines where possible. The area thus outlined was incised deep to adipose tissue with a #15 scalpel blade. The skin margins were undermined to an appropriate distance in all directions utilizing iris scissors and carried over to close the primary defect.
Complex Repair And A-T Advancement Flap Text: The defect edges were debeveled with a #15 scalpel blade.  The primary defect was closed partially with a complex linear closure.  Given the location of the remaining defect, shape of the defect and the proximity to free margins an A-T advancement flap was deemed most appropriate for complete closure of the defect.  Using a sterile surgical marker, an appropriate advancement flap was drawn incorporating the defect and placing the expected incisions within the relaxed skin tension lines where possible. The area thus outlined was incised deep to adipose tissue with a #15 scalpel blade. The skin margins were undermined to an appropriate distance in all directions utilizing iris scissors and carried over to close the primary defect.
Complex Repair And O-T Advancement Flap Text: The defect edges were debeveled with a #15 scalpel blade.  The primary defect was closed partially with a complex linear closure.  Given the location of the remaining defect, shape of the defect and the proximity to free margins an O-T advancement flap was deemed most appropriate for complete closure of the defect.  Using a sterile surgical marker, an appropriate advancement flap was drawn incorporating the defect and placing the expected incisions within the relaxed skin tension lines where possible. The area thus outlined was incised deep to adipose tissue with a #15 scalpel blade. The skin margins were undermined to an appropriate distance in all directions utilizing iris scissors and carried over to close the primary defect.
Complex Repair And O-L Flap Text: The defect edges were debeveled with a #15 scalpel blade.  The primary defect was closed partially with a complex linear closure.  Given the location of the remaining defect, shape of the defect and the proximity to free margins an O-L flap was deemed most appropriate for complete closure of the defect.  Using a sterile surgical marker, an appropriate flap was drawn incorporating the defect and placing the expected incisions within the relaxed skin tension lines where possible. The area thus outlined was incised deep to adipose tissue with a #15 scalpel blade. The skin margins were undermined to an appropriate distance in all directions utilizing iris scissors and carried over to close the primary defect.
Complex Repair And Bilobe Flap Text: The defect edges were debeveled with a #15 scalpel blade.  The primary defect was closed partially with a complex linear closure.  Given the location of the remaining defect, shape of the defect and the proximity to free margins a bilobe flap was deemed most appropriate for complete closure of the defect.  Using a sterile surgical marker, an appropriate advancement flap was drawn incorporating the defect and placing the expected incisions within the relaxed skin tension lines where possible. The area thus outlined was incised deep to adipose tissue with a #15 scalpel blade. The skin margins were undermined to an appropriate distance in all directions utilizing iris scissors and carried over to close the primary defect.
Complex Repair And Melolabial Flap Text: The defect edges were debeveled with a #15 scalpel blade.  The primary defect was closed partially with a complex linear closure.  Given the location of the remaining defect, shape of the defect and the proximity to free margins a melolabial flap was deemed most appropriate for complete closure of the defect.  Using a sterile surgical marker, an appropriate advancement flap was drawn incorporating the defect and placing the expected incisions within the relaxed skin tension lines where possible. The area thus outlined was incised deep to adipose tissue with a #15 scalpel blade. The skin margins were undermined to an appropriate distance in all directions utilizing iris scissors and carried over to close the primary defect.
Complex Repair And Rotation Flap Text: The defect edges were debeveled with a #15 scalpel blade.  The primary defect was closed partially with a complex linear closure.  Given the location of the remaining defect, shape of the defect and the proximity to free margins a rotation flap was deemed most appropriate for complete closure of the defect.  Using a sterile surgical marker, an appropriate advancement flap was drawn incorporating the defect and placing the expected incisions within the relaxed skin tension lines where possible. The area thus outlined was incised deep to adipose tissue with a #15 scalpel blade. The skin margins were undermined to an appropriate distance in all directions utilizing iris scissors and carried over to close the primary defect.
Complex Repair And Rhombic Flap Text: The defect edges were debeveled with a #15 scalpel blade.  The primary defect was closed partially with a complex linear closure.  Given the location of the remaining defect, shape of the defect and the proximity to free margins a rhombic flap was deemed most appropriate for complete closure of the defect.  Using a sterile surgical marker, an appropriate advancement flap was drawn incorporating the defect and placing the expected incisions within the relaxed skin tension lines where possible. The area thus outlined was incised deep to adipose tissue with a #15 scalpel blade. The skin margins were undermined to an appropriate distance in all directions utilizing iris scissors and carried over to close the primary defect.
Complex Repair And Transposition Flap Text: The defect edges were debeveled with a #15 scalpel blade.  The primary defect was closed partially with a complex linear closure.  Given the location of the remaining defect, shape of the defect and the proximity to free margins a transposition flap was deemed most appropriate for complete closure of the defect.  Using a sterile surgical marker, an appropriate advancement flap was drawn incorporating the defect and placing the expected incisions within the relaxed skin tension lines where possible. The area thus outlined was incised deep to adipose tissue with a #15 scalpel blade. The skin margins were undermined to an appropriate distance in all directions utilizing iris scissors and carried over to close the primary defect.
Complex Repair And V-Y Plasty Text: The defect edges were debeveled with a #15 scalpel blade.  The primary defect was closed partially with a complex linear closure.  Given the location of the remaining defect, shape of the defect and the proximity to free margins a V-Y plasty was deemed most appropriate for complete closure of the defect.  Using a sterile surgical marker, an appropriate advancement flap was drawn incorporating the defect and placing the expected incisions within the relaxed skin tension lines where possible. The area thus outlined was incised deep to adipose tissue with a #15 scalpel blade. The skin margins were undermined to an appropriate distance in all directions utilizing iris scissors and carried over to close the primary defect.
Complex Repair And M Plasty Text: The defect edges were debeveled with a #15 scalpel blade.  The primary defect was closed partially with a complex linear closure.  Given the location of the remaining defect, shape of the defect and the proximity to free margins an M plasty was deemed most appropriate for complete closure of the defect.  Using a sterile surgical marker, an appropriate advancement flap was drawn incorporating the defect and placing the expected incisions within the relaxed skin tension lines where possible. The area thus outlined was incised deep to adipose tissue with a #15 scalpel blade. The skin margins were undermined to an appropriate distance in all directions utilizing iris scissors and carried over to close the primary defect.
Complex Repair And Double M Plasty Text: The defect edges were debeveled with a #15 scalpel blade.  The primary defect was closed partially with a complex linear closure.  Given the location of the remaining defect, shape of the defect and the proximity to free margins a double M plasty was deemed most appropriate for complete closure of the defect.  Using a sterile surgical marker, an appropriate advancement flap was drawn incorporating the defect and placing the expected incisions within the relaxed skin tension lines where possible. The area thus outlined was incised deep to adipose tissue with a #15 scalpel blade. The skin margins were undermined to an appropriate distance in all directions utilizing iris scissors and carried over to close the primary defect.
Complex Repair And W Plasty Text: The defect edges were debeveled with a #15 scalpel blade.  The primary defect was closed partially with a complex linear closure.  Given the location of the remaining defect, shape of the defect and the proximity to free margins a W plasty was deemed most appropriate for complete closure of the defect.  Using a sterile surgical marker, an appropriate advancement flap was drawn incorporating the defect and placing the expected incisions within the relaxed skin tension lines where possible. The area thus outlined was incised deep to adipose tissue with a #15 scalpel blade. The skin margins were undermined to an appropriate distance in all directions utilizing iris scissors and carried over to close the primary defect.
Complex Repair And Z Plasty Text: The defect edges were debeveled with a #15 scalpel blade.  The primary defect was closed partially with a complex linear closure.  Given the location of the remaining defect, shape of the defect and the proximity to free margins a Z plasty was deemed most appropriate for complete closure of the defect.  Using a sterile surgical marker, an appropriate advancement flap was drawn incorporating the defect and placing the expected incisions within the relaxed skin tension lines where possible. The area thus outlined was incised deep to adipose tissue with a #15 scalpel blade. The skin margins were undermined to an appropriate distance in all directions utilizing iris scissors and carried over to close the primary defect.
Complex Repair And Dorsal Nasal Flap Text: The defect edges were debeveled with a #15 scalpel blade.  The primary defect was closed partially with a complex linear closure.  Given the location of the remaining defect, shape of the defect and the proximity to free margins a dorsal nasal flap was deemed most appropriate for complete closure of the defect.  Using a sterile surgical marker, an appropriate flap was drawn incorporating the defect and placing the expected incisions within the relaxed skin tension lines where possible. The area thus outlined was incised deep to adipose tissue with a #15 scalpel blade. The skin margins were undermined to an appropriate distance in all directions utilizing iris scissors and carried over to close the primary defect.
Complex Repair And Ftsg Text: The defect edges were debeveled with a #15 scalpel blade.  The primary defect was closed partially with a complex linear closure.  Given the location of the defect, shape of the defect and the proximity to free margins a full thickness skin graft was deemed most appropriate to repair the remaining defect.  The graft was trimmed to fit the size of the remaining defect.  The graft was then placed in the primary defect, oriented appropriately, and sutured into place.
Complex Repair And Burow's Graft Text: The defect edges were debeveled with a #15 scalpel blade.  The primary defect was closed partially with a complex linear closure.  Given the location of the defect, shape of the defect, the proximity to free margins and the presence of a standing cone deformity a Burow's graft was deemed most appropriate to repair the remaining defect.  The graft was trimmed to fit the size of the remaining defect.  The graft was then placed in the primary defect, oriented appropriately, and sutured into place.
Complex Repair And Split-Thickness Skin Graft Text: The defect edges were debeveled with a #15 scalpel blade.  The primary defect was closed partially with a complex linear closure.  Given the location of the defect, shape of the defect and the proximity to free margins a split thickness skin graft was deemed most appropriate to repair the remaining defect.  The graft was trimmed to fit the size of the remaining defect.  The graft was then placed in the primary defect, oriented appropriately, and sutured into place.
Complex Repair And Epidermal Autograft Text: The defect edges were debeveled with a #15 scalpel blade.  The primary defect was closed partially with a complex linear closure.  Given the location of the defect, shape of the defect and the proximity to free margins an epidermal autograft was deemed most appropriate to repair the remaining defect.  The graft was trimmed to fit the size of the remaining defect.  The graft was then placed in the primary defect, oriented appropriately, and sutured into place.
Complex Repair And Dermal Autograft Text: The defect edges were debeveled with a #15 scalpel blade.  The primary defect was closed partially with a complex linear closure.  Given the location of the defect, shape of the defect and the proximity to free margins an dermal autograft was deemed most appropriate to repair the remaining defect.  The graft was trimmed to fit the size of the remaining defect.  The graft was then placed in the primary defect, oriented appropriately, and sutured into place.
Complex Repair And Tissue Cultured Epidermal Autograft Text: The defect edges were debeveled with a #15 scalpel blade.  The primary defect was closed partially with a complex linear closure.  Given the location of the defect, shape of the defect and the proximity to free margins an tissue cultured epidermal autograft was deemed most appropriate to repair the remaining defect.  The graft was trimmed to fit the size of the remaining defect.  The graft was then placed in the primary defect, oriented appropriately, and sutured into place.
Complex Repair And Xenograft Text: The defect edges were debeveled with a #15 scalpel blade.  The primary defect was closed partially with a complex linear closure.  Given the location of the defect, shape of the defect and the proximity to free margins a xenograft was deemed most appropriate to repair the remaining defect.  The graft was trimmed to fit the size of the remaining defect.  The graft was then placed in the primary defect, oriented appropriately, and sutured into place.
Complex Repair And Skin Substitute Graft Text: The defect edges were debeveled with a #15 scalpel blade.  The primary defect was closed partially with a complex linear closure.  Given the location of the remaining defect, shape of the defect and the proximity to free margins a skin substitute graft was deemed most appropriate to repair the remaining defect.  The graft was trimmed to fit the size of the remaining defect.  The graft was then placed in the primary defect, oriented appropriately, and sutured into place.
Include Anticoagulation In Mohs Note?: Please Select the Appropriate Response
Path Notes (To The Dermatopathologist): Please check margins.
Consent was obtained from the patient. The risks and benefits to therapy were discussed in detail. Specifically, the risks of infection, scarring, bleeding, prolonged wound healing, incomplete removal, allergy to anesthesia, nerve injury and recurrence were addressed. Prior to the procedure, the treatment site was clearly identified and confirmed by the patient. All components of Universal Protocol/PAUSE Rule completed.
Post-Care Instructions: I reviewed with the patient in detail post-care instructions. Patient is not to engage in any heavy lifting, exercise, or swimming for the next 14 days. Should the patient develop any fevers, chills, bleeding, severe pain patient will contact the office immediately.
Home Suture Removal Text: Patient was provided a home suture removal kit and will remove their sutures at home.  If they have any questions or difficulties they will call the office.
Where Do You Want The Question To Include Opioid Counseling Located?: Case Summary Tab
Information: Selecting Yes will display possible errors in your note based on the variables you have selected. This validation is only offered as a suggestion for you. PLEASE NOTE THAT THE VALIDATION TEXT WILL BE REMOVED WHEN YOU FINALIZE YOUR NOTE. IF YOU WANT TO FAX A PRELIMINARY NOTE YOU WILL NEED TO TOGGLE THIS TO 'NO' IF YOU DO NOT WANT IT IN YOUR FAXED NOTE.

## 2025-02-26 LAB
BACTERIA WND AEROBE CULT: ABNORMAL
BACTERIA WND AEROBE CULT: ABNORMAL
GRAM STN SPEC: ABNORMAL
SIGNIFICANT IND 70042: ABNORMAL
SITE SITE: ABNORMAL
SOURCE SOURCE: ABNORMAL

## 2025-03-10 ENCOUNTER — APPOINTMENT (OUTPATIENT)
Dept: URBAN - METROPOLITAN AREA CLINIC 15 | Facility: CLINIC | Age: 75
Setting detail: DERMATOLOGY
End: 2025-03-10

## 2025-03-10 DIAGNOSIS — Z48.02 ENCOUNTER FOR REMOVAL OF SUTURES: ICD-10-CM

## 2025-03-10 PROCEDURE — ? SUTURE REMOVAL (GLOBAL PERIOD)

## 2025-03-10 ASSESSMENT — LOCATION ZONE DERM: LOCATION ZONE: ARM

## 2025-03-10 ASSESSMENT — LOCATION SIMPLE DESCRIPTION DERM: LOCATION SIMPLE: RIGHT UPPER ARM

## 2025-03-10 ASSESSMENT — LOCATION DETAILED DESCRIPTION DERM: LOCATION DETAILED: RIGHT DISTAL POSTERIOR UPPER ARM

## 2025-03-10 NOTE — PROCEDURE: SUTURE REMOVAL (GLOBAL PERIOD)
Detail Level: Detailed
Add 47327 Cpt? (Important Note: In 2017 The Use Of 46971 Is Being Tracked By Cms To Determine Future Global Period Reimbursement For Global Periods): no

## 2025-03-23 ENCOUNTER — NON-PROVIDER VISIT (OUTPATIENT)
Dept: CARDIOLOGY | Facility: MEDICAL CENTER | Age: 75
End: 2025-03-23
Payer: COMMERCIAL

## 2025-03-24 PROCEDURE — 93298 REM INTERROG DEV EVAL SCRMS: CPT | Performed by: INTERNAL MEDICINE

## 2025-03-24 NOTE — CARDIAC REMOTE MONITOR - SCAN
Device transmission reviewed. Device demonstrated appropriate function.       Electronically Signed by: Eli Tang M.D.    4/19/2025  9:44 PM

## 2025-04-23 ENCOUNTER — NON-PROVIDER VISIT (OUTPATIENT)
Dept: CARDIOLOGY | Facility: MEDICAL CENTER | Age: 75
End: 2025-04-23
Payer: COMMERCIAL

## 2025-04-23 PROCEDURE — 93298 REM INTERROG DEV EVAL SCRMS: CPT | Mod: 26 | Performed by: STUDENT IN AN ORGANIZED HEALTH CARE EDUCATION/TRAINING PROGRAM

## 2025-04-23 NOTE — CARDIAC REMOTE MONITOR - SCAN
Device transmission reviewed. Device demonstrated appropriate function.       Electronically Signed by: Pravin Horan MD, PhD    4/24/2025  7:46 AM

## 2025-05-24 ENCOUNTER — NON-PROVIDER VISIT (OUTPATIENT)
Dept: CARDIOLOGY | Facility: MEDICAL CENTER | Age: 75
End: 2025-05-24
Payer: COMMERCIAL

## 2025-05-27 NOTE — CARDIAC REMOTE MONITOR - SCAN
Device transmission reviewed. Device demonstrated appropriate function.       Electronically Signed by: Ivan Kelly M.D.    5/27/2025  2:15 PM

## 2025-06-02 DIAGNOSIS — I25.10 CORONARY ARTERY DISEASE INVOLVING NATIVE CORONARY ARTERY OF NATIVE HEART WITHOUT ANGINA PECTORIS: ICD-10-CM

## 2025-06-02 DIAGNOSIS — E78.5 DYSLIPIDEMIA: Primary | ICD-10-CM

## 2025-06-03 RX ORDER — ROSUVASTATIN CALCIUM 10 MG/1
10 TABLET, COATED ORAL DAILY
Qty: 90 TABLET | Refills: 0 | Status: SHIPPED | OUTPATIENT
Start: 2025-06-03

## 2025-06-03 NOTE — TELEPHONE ENCOUNTER
Is the patient due for a refill? Yes    Was the patient seen the last 15 months? Yes    Date of last office visit: 10/15/24    Does the patient have an upcoming appointment?  No       Provider to refill: JS     Does the patient have care home Plus and need 100-day supply? (This applies to ALL medications) Patient does not have SCP

## 2025-06-09 DIAGNOSIS — E78.5 DYSLIPIDEMIA: ICD-10-CM

## 2025-06-09 DIAGNOSIS — Z95.1 S/P CORONARY ARTERY BYPASS GRAFT X 5: ICD-10-CM

## 2025-06-12 NOTE — TELEPHONE ENCOUNTER
Is the patient due for a refill? Yes    Was the patient seen the last 15 months? Yes    Date of last office visit: 10.15.2024    Does the patient have an upcoming appointment?  Yes   If yes, When? 07.09.2025    Provider to refill:TAMERA    Does the patient have USP Plus and need 100-day supply? (This applies to ALL medications) Patient does not have SCP

## 2025-06-16 RX ORDER — EZETIMIBE 10 MG/1
10 TABLET ORAL DAILY
Qty: 90 TABLET | Refills: 0 | Status: SHIPPED | OUTPATIENT
Start: 2025-06-16

## 2025-06-24 ENCOUNTER — NON-PROVIDER VISIT (OUTPATIENT)
Dept: CARDIOLOGY | Facility: MEDICAL CENTER | Age: 75
End: 2025-06-24
Payer: COMMERCIAL

## 2025-06-24 PROCEDURE — 93298 REM INTERROG DEV EVAL SCRMS: CPT | Mod: 26 | Performed by: INTERNAL MEDICINE

## 2025-07-01 NOTE — PROGRESS NOTES
"No chief complaint on file.      Subjective     Sebastian Gomez is a very pleasant 73 y.o. male patient of Dr. Ferrell who presents today for follow up.    Patient was last seen by myself on 10/15/2024.  He continues to have complaints of chronic fatigue with new concern of having balance issues.  ILR showed 0.01% A-fib burden.  He was referred to physical therapy for his balance problems.    Patient is also followed by Dr. Sukhjinder Stephenson.    Past medical history pertinent for AFL H/O ablation, pAF H/O PRESTON-C, pHTN, ILR In Situ, ZHANE, HTN, CAD H/O CABG in 2013, HLD and asthma.     Today patient states that     Past Medical History:   Diagnosis Date    Anesthesia     pt felt fentanyl gave him \"hangover for 3 days\"    Arrhythmia     pt unaware    ASTHMA     prn    Breath shortness     Bronchitis     Cataract 2022    IOL bilat    Coronary artery disease due to lipid rich plaque - CABG     High cholesterol     History of myocardial infarction     unaware of at the time    Implantable loop recorder present 04/04/2023    Pain 09/16/2022    intermittent in chest with exercise    Pneumonia     Postoperative atrial fibrillation (HCC) 2013 in setting of CABG     Primary hypertension     S/P coronary artery bypass graft x 5     Sleep apnea     no cpap    Snoring     Typical atrial flutter (HCC) sen 9/16/2022      Past Surgical History:   Procedure Laterality Date    CATARACT EXTRACTION WITH IOL Bilateral 2022    FISTULA IN ANO REPAIR  2018    MULTIPLE CORONARY ARTERY BYPASS ENDO VEIN HARVEST  12/07/2013    Performed by Mo Vincent M.D. at SURGERY [facility] ORS    CHOLECYSTECTOMY  1996    OTHER CARDIAC SURGERY      watchman    OTHER CARDIAC SURGERY      Loop Recorder     Family History   Problem Relation Age of Onset    Diabetes Mother     Stroke Mother     Cancer Father         None    Heart Disease Neg Hx      Social History     Socioeconomic History    Marital status:      Spouse name: Kaylee    Number of " children: Not on file    Years of education: Not on file    Highest education level: Bachelor's degree (e.g., BA, AB, BS)   Occupational History     Employer: [employer]   Tobacco Use    Smoking status: Every Day     Current packs/day: 0.30     Average packs/day: 0.4 packs/day for 78.6 years (27.9 ttl pk-yrs)     Types: Cigarettes     Start date: 9/1/1968     Passive exposure: Past    Smokeless tobacco: Never    Tobacco comments:     I've quit many times over the years but I always start again - smokes half pack daily   Vaping Use    Vaping status: Never Used   Substance and Sexual Activity    Alcohol use: Yes     Alcohol/week: 2.4 oz     Types: 4 Cans of beer per week     Comment: I usually will only drink once a week and it’s usually 3    Drug use: No    Sexual activity: Not on file   Other Topics Concern    Not on file   Social History Narrative    Not on file     Social Drivers of Health     Financial Resource Strain: Low Risk  (4/19/2023)    Overall Financial Resource Strain (CARDIA)     Difficulty of Paying Living Expenses: Not hard at all   Food Insecurity: No Food Insecurity (4/19/2023)    Hunger Vital Sign     Worried About Running Out of Food in the Last Year: Never true     Ran Out of Food in the Last Year: Never true   Transportation Needs: No Transportation Needs (4/19/2023)    PRAPARE - Transportation     Lack of Transportation (Medical): No     Lack of Transportation (Non-Medical): No   Physical Activity: Sufficiently Active (4/19/2023)    Exercise Vital Sign     Days of Exercise per Week: 6 days     Minutes of Exercise per Session: 60 min   Stress: Stress Concern Present (4/19/2023)    [employer] - Occupational Stress Questionnaire     Feeling of Stress : To some extent   Social Connections: Unknown (4/19/2023)    Social Connection and Isolation Panel [NHANES]     Frequency of Communication with Friends and Family: Twice a week     Frequency of Social Gatherings with Friends  and Family: Patient declined     Attends Congregation Services: 1 to 4 times per year     Active Member of Clubs or Organizations: No     Attends Club or Organization Meetings: Never     Marital Status:    Intimate Partner Violence: Not on file   Housing Stability: Low Risk  (4/19/2023)    Housing Stability Vital Sign     Unable to Pay for Housing in the Last Year: No     Number of Places Lived in the Last Year: 1     Unstable Housing in the Last Year: No     Allergies   Allergen Reactions    Atorvastatin Diarrhea     Pt reports that it made him constipated and had diarrhea    Cat Hair Extract Runny Nose and Itching     .    Xarelto [Rivaroxaban]      Pt reports that it made him constipated and had diarrhea     Outpatient Encounter Medications as of 7/9/2025   Medication Sig Dispense Refill    ezetimibe (ZETIA) 10 MG Tab Take 1 tablet by mouth once daily 90 Tablet 0    rosuvastatin (CRESTOR) 10 MG Tab Take 1 tablet by mouth once daily 90 Tablet 0    sulfamethoxazole-trimethoprim (BACTRIM DS) 800-160 MG tablet Take 1 Tablet by mouth 2 times a day. 20 Tablet 0    aspirin 81 MG EC tablet Take 81 mg by mouth every day.      oxyCODONE-acetaminophen (PERCOCET-10)  MG Tab Take 1 Tablet by mouth every 6 hours as needed for Severe Pain.      ibuprofen (ADVIL) 200 MG Tab Take 200 mg by mouth every 6 hours as needed. Indications: Pain       No facility-administered encounter medications on file as of 7/9/2025.     ROS           Objective     There were no vitals taken for this visit.    Physical Exam       I. CORONARY ANGIOGRAPHY:  Left main coronary artery: Large caliber vessel giving rise to the LAD and circumflex.  No CAD.  Left anterior descending artery: Large caliber vessel which is subtotally occluded in its proximal midportion.  Fills via 1 patent LIMA to LAD to a large distal vessel.  Left circumflex coronary artery: Large-caliber codominant vessel.  There is 50% proximal stenosis.  OM1/ramus is subtotally  occluded and fills via retrograde flow from the grafted segment which is widely patent.  OM 2 is chronic totally occluded.  It fills via a patent skip segment from the OM1 graft.  Circumflex itself continues in the AV groove and supplies a small posterior descending vessel and is free from high-grade disease.  Right coronary artery: Chronic totally occluded.  Fills via widely patent vein graft to the distal vessel.  LIMA to LAD: Widely patent  VG to RCA: Widely patent  VG to DX-OM1-OM2: Occluded diagonal segment with widely patent flow to the OM1 and OM 2 and retrograde filling of the OM1 into its large parent branch in continuity with the circumflex.      Assessment & Plan     No diagnosis found.          Medical Decision Making: Today's Assessment/Status/Plan:   pAF/AFL:  - H/O AFL (RA flutter with bidirectional isthmus block) in 2022.  - S/P PVI, AT (OS ostium) and CTI block with Dr. Ferrell on 2/12/2024.   - H/O PRESTON-C via WATCHMAN 4/19/2023 on ASA 81 mg daily.   - ILR showing pAF/AFL 0.01% burden.    RV dysfunction with pHTN (44 mmHg):  -Patient appears well compensated on exam.  Will repeat echocardiogram at next visit to reevaluate.  Followed by pulmonary medicine.    CAD/HLD:  - H/O CABG in 2013.  - LHC in 2022 showed patent VG to OM1 skip OM2 but occluded timy DX segment with otherwise patent grafts.  to RCA.   - Continues to deny anginal symptoms.  - LDL goal is <70, currently 71. Unable to up titrate Crestor due to intolerance.  Does not want to try Repatha.    Elevated creatinine:  - Baseline creatinine appears to be approximately 1, last chemistry panel with creatinine of 1.4.  Repeat BMP ordered to reevaluate in 4 to 6 weeks.    Tobacco Cessation:  - Patient is not currently interested in smoking cessation. We have previously discussed low dose CT annual screening for lung cancer. Patient is interested and will discuss with his PCP.     Patient was referred to physical therapy for his balance issues for  evaluation.  Encourage patient to also discuss with his PCP.     Patient will follow up in 6 months or earlier if needed. Encouraged patient to contact our office should any questions or concerns arise in the mean time.

## 2025-07-09 ENCOUNTER — APPOINTMENT (OUTPATIENT)
Dept: CARDIOLOGY | Facility: MEDICAL CENTER | Age: 75
End: 2025-07-09
Attending: NURSE PRACTITIONER
Payer: COMMERCIAL

## 2025-07-25 ENCOUNTER — NON-PROVIDER VISIT (OUTPATIENT)
Dept: CARDIOLOGY | Facility: MEDICAL CENTER | Age: 75
End: 2025-07-25
Payer: COMMERCIAL

## 2025-08-01 PROBLEM — J43.9 EMPHYSEMA, UNSPECIFIED (HCC): Status: ACTIVE | Noted: 2025-08-01

## 2025-08-04 ENCOUNTER — OFFICE VISIT (OUTPATIENT)
Dept: CARDIOLOGY | Facility: MEDICAL CENTER | Age: 75
End: 2025-08-04
Attending: NURSE PRACTITIONER
Payer: COMMERCIAL

## 2025-08-04 VITALS
HEART RATE: 59 BPM | WEIGHT: 162 LBS | HEIGHT: 72 IN | SYSTOLIC BLOOD PRESSURE: 110 MMHG | RESPIRATION RATE: 16 BRPM | BODY MASS INDEX: 21.94 KG/M2 | DIASTOLIC BLOOD PRESSURE: 58 MMHG | OXYGEN SATURATION: 94 %

## 2025-08-04 DIAGNOSIS — E78.5 DYSLIPIDEMIA: ICD-10-CM

## 2025-08-04 DIAGNOSIS — I25.10 CORONARY ARTERY DISEASE DUE TO LIPID RICH PLAQUE: Chronic | ICD-10-CM

## 2025-08-04 DIAGNOSIS — I25.83 CORONARY ARTERY DISEASE DUE TO LIPID RICH PLAQUE: Chronic | ICD-10-CM

## 2025-08-04 DIAGNOSIS — I48.3 TYPICAL ATRIAL FLUTTER (HCC): Primary | ICD-10-CM

## 2025-08-04 DIAGNOSIS — Z98.890 H/O CARDIAC RADIOFREQUENCY ABLATION: ICD-10-CM

## 2025-08-04 DIAGNOSIS — I25.10 CORONARY ARTERY DISEASE INVOLVING NATIVE CORONARY ARTERY OF NATIVE HEART WITHOUT ANGINA PECTORIS: ICD-10-CM

## 2025-08-04 DIAGNOSIS — Z95.1 S/P CORONARY ARTERY BYPASS GRAFT X 5: ICD-10-CM

## 2025-08-04 LAB — EKG IMPRESSION: NORMAL

## 2025-08-04 PROCEDURE — 3074F SYST BP LT 130 MM HG: CPT | Performed by: NURSE PRACTITIONER

## 2025-08-04 PROCEDURE — 93005 ELECTROCARDIOGRAM TRACING: CPT | Mod: TC | Performed by: NURSE PRACTITIONER

## 2025-08-04 PROCEDURE — 3078F DIAST BP <80 MM HG: CPT | Performed by: NURSE PRACTITIONER

## 2025-08-04 PROCEDURE — 99212 OFFICE O/P EST SF 10 MIN: CPT | Performed by: NURSE PRACTITIONER

## 2025-08-04 PROCEDURE — 99213 OFFICE O/P EST LOW 20 MIN: CPT | Performed by: NURSE PRACTITIONER

## 2025-08-04 RX ORDER — ROSUVASTATIN CALCIUM 10 MG/1
10 TABLET, COATED ORAL DAILY
Qty: 90 TABLET | Refills: 3 | Status: SHIPPED | OUTPATIENT
Start: 2025-08-04

## 2025-08-04 RX ORDER — EZETIMIBE 10 MG/1
10 TABLET ORAL DAILY
Qty: 90 TABLET | Refills: 3 | Status: SHIPPED | OUTPATIENT
Start: 2025-08-04

## 2025-08-04 ASSESSMENT — ENCOUNTER SYMPTOMS
MUSCULOSKELETAL NEGATIVE: 1
CONSTITUTIONAL NEGATIVE: 1
SENSORY CHANGE: 0
EYES NEGATIVE: 1
HALLUCINATIONS: 0
DEPRESSION: 0
NERVOUS/ANXIOUS: 0
NAUSEA: 0
NEUROLOGICAL NEGATIVE: 1
DIZZINESS: 0
GASTROINTESTINAL NEGATIVE: 1
SHORTNESS OF BREATH: 0
CLAUDICATION: 0
ORTHOPNEA: 0
PALPITATIONS: 0
WHEEZING: 0
PND: 0
BRUISES/BLEEDS EASILY: 0
RESPIRATORY NEGATIVE: 1

## 2025-08-04 ASSESSMENT — FIBROSIS 4 INDEX: FIB4 SCORE: 1.36

## 2025-08-25 ENCOUNTER — NON-PROVIDER VISIT (OUTPATIENT)
Dept: CARDIOLOGY | Facility: MEDICAL CENTER | Age: 75
End: 2025-08-25
Payer: COMMERCIAL

## 2025-08-25 PROCEDURE — 93298 REM INTERROG DEV EVAL SCRMS: CPT | Mod: 26 | Performed by: STUDENT IN AN ORGANIZED HEALTH CARE EDUCATION/TRAINING PROGRAM
